# Patient Record
Sex: MALE | Race: WHITE | Employment: FULL TIME | URBAN - METROPOLITAN AREA
[De-identification: names, ages, dates, MRNs, and addresses within clinical notes are randomized per-mention and may not be internally consistent; named-entity substitution may affect disease eponyms.]

---

## 2017-04-15 ENCOUNTER — PATIENT MESSAGE (OUTPATIENT)
Dept: FAMILY MEDICINE CLINIC | Age: 57
End: 2017-04-15

## 2017-04-17 NOTE — TELEPHONE ENCOUNTER
From: Alverto Marcum  To: Juan Underwood MD  Sent: 4/15/2017 11:11 AM EDT  Subject: Update Medical Information    Dr Ruth Kee,  Second email to update my medical information and provide you an update of my current back issue. [id number]: Conducted a EMG test via Neurology department. [id number]: Had another MRI for my lower back. 42XWN05: Spoke with Dr. Shahid Walls and are left with the option of surgery for Stenosis S1, disc issues with L4,L5 & S1. Due to the procedure to relieve the nerve; he will also need to fuse L5 & S1.  1Mar17: Prescribed jennyfer for nerve pain. Jennyfer did work, but due to the weight gain side effect I have elected to no longer take the medication.    31XNP12: Pre-Op appointment  15WIE22: Back surgery  If you have any questions please feel free to contact me.    Steph John

## 2017-04-17 NOTE — TELEPHONE ENCOUNTER
From: Steph Underwood  To: Meng Lind MD  Sent: 4/15/2017 11:02 AM EDT  Subject: Update Medical Information    Dr Merrill Dee,  The following information is to update my medical information and provide you update of my current back issue. 7Apr16: MRI study of the lumbar spine requested by Dr. Linnette Matta: Three (3) MR Lumbar Spine injections over 2 month period - Provided temporary relief. Jun-Oct16: Continued with personal exercises based on previous PT sessions   [id number]: Met with Dr. Jen Segura from the [facility] for ongoing back issues. Reviewed MRI from Apr2016 and diagnosed Lumbar Stenosis / L5 Radiculopathy Transforaminal Right L5/S1 jez Diagnostic / Therapeutic. [id number]: Received (6) Right Transoraminal Epidural Steroid Injection at L5/S1 - Provided temporary relief.     More information to follow on the next email.  Nona Nunez

## 2017-07-13 ENCOUNTER — HOSPITAL ENCOUNTER (OUTPATIENT)
Dept: PHYSICAL THERAPY | Age: 57
Discharge: HOME | End: 2017-07-13
Payer: OTHER GOVERNMENT

## 2017-07-13 PROCEDURE — 97161 PT EVAL LOW COMPLEX 20 MIN: CPT

## 2017-07-13 PROCEDURE — 97110 THERAPEUTIC EXERCISES: CPT

## 2017-07-13 NOTE — PROGRESS NOTES
SUBJECTIVE  Pain Level (0-10 scale): 1  []constant []intermittent []improving []worsening []no change since onset    Any medication changes, allergies to medications, adverse drug reactions, diagnosis change, or new procedure performed?: [x] No    [] Yes (see summary sheet for update)  Subjective functional status/changes:     PLOF: walking, cleaning house, walking the dog, light yard work  Limitations to PLOF: still has to move around at work (sitting was better before than now), better walking than before surgery, [facility] 11 [facility] [place]  Mechanism of Injury: Lumbar fusion L5-S1 with laminectomy on R side and disc repair on 05/30/2017, after  Chronic lower back pain, stenosis, and worsened pain in his hips with stenosis and pressure on nerve  Current symptoms/Complaints: Back related - mobility/ROM, numbness in R LE especially with prolonged sitting; reports HS and now calf pain  Previous Treatment/Compliance: PT for low back pain and injections, no lasting pain relief  PMHx/Surgical Hx: Weight loss (> 10 lbs), lumbar fusion, L knee ACL  Work Hx: Works full time with sitting/computer  Living Situation: Lives with wife, two story home with no difficulty with stairs  Pt Goals: \"Strength, more motion and learn what I can do to improve my condition. \"  Barriers: []pain []financial []time []transportation []other  Motivation: Good  Substance use: []Alcohol []Tobacco []other:   FABQ Score: [x]low []elevate  Cognition: A & O x 3    Other:    OBJECTIVE/EXAMINATION    Symptoms:  Aggravated by:   [x] Bending [x] Sitting [] Standing [] Walking   [] Moving [] Cough [] Sneeze [] Valsalva   [x] AM  [] PM  Lying:  [] sup   [] pro   [x] sidelying   [] Other:     Eased by:    [] Bending [] Sitting [x] Standing [x] Walking   [] Moving [] AM  [] PM  Lying: [] sup  [] pro  [] sidelying   [] Other:     General Health:  Red Flags Indicated?  [] Yes    [] No  [x] Yes [] No Recent weight change (If yes, due to dieting?  [] Yes  [x] No)   [] Yes [x] No Weakness in legs during walking  [] Yes [x] No Unremitting pain at night  [] Yes [x] No Abdominal pain or problems  [] Yes [x] No Rectal bleeding  [] Yes [x] No Feet more cold or painful in cold weather  [] Yes [x] No Menstrual irregularities  [] Yes [x] No Blood or pain with urination  [] Yes [x] No Dysfunction of bowel or bladder  [] Yes [x] No Recent illness within past 3 weeks (i.e, cold, flu)  [] Yes [x] No Numbness/tingling in buttock/genitalia region    OBJECTIVE  Posture:  Lateral Shift: [] R    [] L     [] +  [] -  Kyphosis: [] Increased [] Decreased   []  WNL  Lordosis:  [] Increased [] Decreased   [] WNL  Pelvic symmetry: [] WNL    [] Other:    Gait:  [] Normal     [] Abnormal:    Active Movements: [] N/A   [] Too acute   [] Other:  ROM % AROM MMT Comments:pain, area   Forward flexion 40-60 85 5 Just past mid shin   Extension 20-30 0 5    SB right 20-30 85 5    SB left 20-30 85 5    Rotation right 5-10 100 5    Rotation left 5-10 100 5      Repeated Movements   Effects on present pain: produces (NY), abolishes (A), increases (incr), decreases (decr), centralizes (C), peripheral (PH), no effect (NE)   Pre-Test Sx Flexion Repeated Flexion Extension Repeated Extension Repeated SBL Repeated SBR   Sitting          Standing          Lying      N/A N/A   Comments:  Side Glide:  Sustained passive positioning test:    Neuro Screen [] WNL  Myotome/Dermatome  Reflexes: 2+ B LE  Comments:    Dural Mobility:  SLR Sitting: [] R    [] L    [] +    [] -  @ (degrees):           Supine: [] R    [] L    [] +    [] -  @ (degrees):   Slump Test: [] R    [] L    [] +    [] -  @ (degrees):   Prone Knee Bend: [] R    [] L    [] +    [] -     Palpation  [x] Min  [] Mod  [] Severe    Location: scar mobility fair B  [] Min  [] Mod  [] Severe    Location:  [] Min  [] Mod  [] Severe    Location:    Strength   L(0-5) R (0-5) N/T   Hip Flexion (L1,2) 5 5 []   Knee Extension (L3,4) 5 5 []   Ankle Dorsiflexion (L4) 5 5 []   Great Toe Extension (L5) 4+ 4+ []   Ankle Plantarflexion (S1) 5 5 []   Knee Flexion (S1,2) 4 4 []   Hip ABD 5 5 []   Hip ADD 5 5 []   Hip ext  4 4 []   Ankle Eversion 5 5 []     Special Tests  Lumbar:  Lumb.  Compression: [] Pos  [] Neg               Lumbar Distraction:   [] Pos  [] Neg    Quadrant:  [] Pos  [] Neg   [] Flex  [] Ext    Sacroilliac:  Gaenslen's: [] R    [] L    [] +    [] -     Compression: [] +    [x] -     Gapping:  [] +    [x] -     Thigh Thrust: [x] R    [x] L    [] +    [x] -     Leg Length: [] +    [] -   Position:    Crests:    ASIS:    PSIS:    Sacral Sulcus:    Mobility: Standing flex:     Sitting flex:     Supine to sit:     Prone knee bend:         Hip: Josefine Gathers:  [x] R    [x] L    [x] +    [] -     Scour:  [] R    [] L    [] +    [] -     Piriformis: [x] R    [x] L    [x] +    [] -          Deficits: Álvaro's: [x] R    [x] L    [x] +    [] -     Nabil: [] R    [] L    [] +    [] -     Hamstrings 90/90: (+) B    Elys: (+) B       Other tests/comments:

## 2017-07-13 NOTE — PROGRESS NOTES
PT DAILY TREATMENT NOTE 3-16    Patient Name: Pranay Aguiar  Date:2017  : 1960  [x]  Patient  Verified  Payor: [facility] / Plan: [employer] [facility] [address] AND DEPENDENTS / Product Type: [employer] /    In Nusrat Patrick  Total Treatment Time (min): 48  Visit #: 1 of 4    Treatment Area: Low back pain [M54.5]    SUBJECTIVE  Pain Level (0-10 scale): 1  Any medication changes, allergies to medications, adverse drug reactions, diagnosis change, or new procedure performed?: [x] No    [] Yes (see summary sheet for update)  Subjective functional status/changes:   [] No changes reported  Mechanism of Injury: Lumbar fusion L5-S1 with laminectomy on R side and disc repair on 2017, after  Chronic lower back pain, stenosis, and worsened pain in his hips with stenosis and pressure on nerve  Current symptoms/Complaints: Back related - mobility/ROM, numbness in R LE especially with prolonged sitting; reports HS and now calf pain    OBJECTIVE    30 min [x]Eval                  []Re-Eval     23 min Therapeutic Exercise:  [x] See flow sheet :   Rationale: increase ROM and increase strength to improve the patients ability to perform ADLs, Reviewed with pt involved anatomy and pathology, treatment plan, and goals. With   [] TE   [] TA   [] neuro   [] other: Patient Education: [x] Review HEP    [] Progressed/Changed HEP based on:   [] positioning   [] body mechanics   [] transfers   [] heat/ice application    [] other:      Other Objective/Functional Measures: see eval.     Pain Level (0-10 scale) post treatment: 1    ASSESSMENT/Changes in Function:   Pt presents s/p L5-S1 lumbar fusion on 2017, with limitations noted in trunk flex/B trunk SB AROM, fair scar mobility B, B great toe ext MMT 4+/5, B hip ext MMT 4/5, B knee flex MMT 4/5, (+) B GERRY/FAIR/Álvaro/HS 90-90/Ely's flexibility special tests, and FOTO score of 50.   Pt would benefit from skilled PT intervention to address the above listed limitations and allow improved functional task completion. Patient will continue to benefit from skilled PT services to modify and progress therapeutic interventions, address functional mobility deficits, address ROM deficits, address strength deficits, analyze and address soft tissue restrictions, analyze and cue movement patterns, analyze and modify body mechanics/ergonomics, assess and modify postural abnormalities and instruct in home and community integration to attain remaining goals. [x]  See Plan of Care  []  See progress note/recertification  []  See Discharge Summary         Progress towards goals / Updated goals:  Short Term Goals: To be accomplished in two weeks:                         1.  Pt will demonstrate compliance with HEP for self management of symptoms. 2.  Pt will demonstrate B hip flex and ext MMT to 5/5 to improve stability for position changes. Long Term Goals: To be accomplished in four weeks:                         1.  Pt will demonstrate FOTO to 66 or greater to indicate improved functional task completion. 2.  Pt will demonstrate B HS 90-90 and Ely's flexibility restrictions to minimal to improve tolerance for prolonged positions. 3.  Pt will report ability to return to community walking without the brace to indicate improved functional core stability.     PLAN  [x]  Upgrade activities as tolerated     [x]  Continue plan of care  [x]  Update interventions per flow sheet       []  Discharge due to:_  []  Other:_      Pernell Ledesma, PT 7/13/2017  5:18 PM    Future Appointments  Date Time Provider Joey Hinton   7/17/2017 4:30 PM [id number] [facility]   7/25/2017 3:30 PM [facility]   8/1/2017 4:30 PM Pernell Ledesma PT [facility]

## 2017-07-13 NOTE — PROGRESS NOTES
[facility]  [address] [facility] [address]  [place], [address]  [phone] fax    Plan of Care/ Statement of Necessity for Physical Therapy Services    Patient name: Leroy Tse Start of Care: 2017   Referral source: Anmol Rader MD : 1960    Medical Diagnosis: Low back pain [M54.5]   Onset Date:2017    Treatment Diagnosis: s/p L5-S1 lumbar fusion   Prior Hospitalization: see medical history Provider#: [id number]   Medications: Verified on Patient summary List    Comorbidities: Weight loss (> 10 lbs), lumbar fusion, L knee ACL   Prior Level of Function: Unable to walk/perform work tasks/sitting without pain     The Plan of Care and following information is based on the information from the initial evaluation. Assessment/ key information: Pt presents s/p L5-S1 lumbar fusion on 2017, with limitations noted in trunk flex/B trunk SB AROM, fair scar mobility B, B great toe ext MMT 4+/5, B hip ext MMT 4/5, B knee flex MMT 4/5, (+) B GERRY/FAIR/Álvaro/HS 90-90/Ely's flexibility special tests, and FOTO score of 50. Pt would benefit from skilled PT intervention to address the above listed limitations and allow improved functional task completion.     Evaluation Complexity History LOW Complexity : Zero comorbidities / personal factors that will impact the outcome / POC; Examination LOW Complexity : 1-2 Standardized tests and measures addressing body structure, function, activity limitation and / or participation in recreation  ;Presentation LOW Complexity : Stable, uncomplicated  ;Clinical Decision Making MEDIUM Complexity : FOTO score of 26-74  Overall Complexity Rating: LOW   Problem List: pain affecting function, decrease ROM, decrease strength, decrease ADL/ functional abilitiies, decrease activity tolerance and decrease flexibility/ joint mobility   Treatment Plan may include any combination of the following: Therapeutic exercise, Therapeutic activities, Neuromuscular re-education, Physical agent/modality, Gait/balance training, Manual therapy, Patient education, Self Care training and Functional mobility training  Patient / Family readiness to learn indicated by: asking questions, trying to perform skills and interest  Persons(s) to be included in education: patient (P)  Barriers to Learning/Limitations: None  Patient Goal (s): \"Strength, more motion and learn what I can do to improve my condition. \"  Patient Self Reported Health Status: good  Rehabilitation Potential: good    Short Term Goals: To be accomplished in two weeks:   1. Pt will demonstrate compliance with HEP for self management of symptoms. 2.  Pt will demonstrate B hip flex and ext MMT to 5/5 to improve stability for position changes. Long Term Goals: To be accomplished in four weeks:   1. Pt will demonstrate FOTO to 66 or greater to indicate improved functional task completion. 2.  Pt will demonstrate B HS 90-90 and Ely's flexibility restrictions to minimal to improve tolerance for prolonged positions. 3.  Pt will report ability to return to community walking without the brace to indicate improved functional core stability. Frequency / Duration: Patient to be seen 1 times per week for 4 weeks. Patient/ Caregiver education and instruction: Diagnosis, prognosis, self care, activity modification and exercises   [x]  Plan of care has been reviewed with ALFONSO Matta, PT 7/13/2017 5:08 PM    ________________________________________________________________________    I certify that the above Therapy Services are being furnished while the patient is under my care. I agree with the treatment plan and certify that this therapy is necessary.     [de-identified] Signature:____________________  Date:____________Time: _________    Please sign and return to [facility]  [address]  [place], [address]  [phone] fax

## 2017-07-17 ENCOUNTER — HOSPITAL ENCOUNTER (OUTPATIENT)
Dept: PHYSICAL THERAPY | Age: 57
Discharge: HOME | End: 2017-07-17
Payer: OTHER GOVERNMENT

## 2017-07-17 PROCEDURE — 97110 THERAPEUTIC EXERCISES: CPT

## 2017-07-17 PROCEDURE — 97112 NEUROMUSCULAR REEDUCATION: CPT

## 2017-07-17 NOTE — PROGRESS NOTES
PT DAILY TREATMENT NOTE     Patient Name: Belinda Enriquez  Date:2017  : 1960  [x]  Patient  Verified  Payor: [facility] / Plan: [employer] [facility] [address] AND DEPENDENTS / Product Type: Keerthi Blain /    In time:4:30  Out time:5:06  Total Treatment Time (min): 36  Visit #: 2 of 4    Treatment Area: Low back pain [M54.5]    SUBJECTIVE  Pain Level (0-10 scale): 0-1  Any medication changes, allergies to medications, adverse drug reactions, diagnosis change, or new procedure performed?: [x] No    [] Yes (see summary sheet for update)  Subjective functional status/changes:   [] No changes reported  \"was all over the place this weekend, walking without pain its great\"    OBJECTIVE    26 min Therapeutic Exercise:  [] See flow sheet :   Rationale: increase ROM and increase strength to improve the patients ability to perform recreational activities without pain       10 min Neuromuscular Re-education:  []  See flow sheet :   Rationale: improve balance and increase proprioception  to improve the patients ability to maintain core activation and stability with daily tasks            With   [] TE   [] TA   [] neuro   [] other: Patient Education: [x] Review HEP    [] Progressed/Changed HEP based on:   [] positioning   [] body mechanics   [] transfers   [] heat/ice application    [] other:      Other Objective/Functional Measures:      Pain Level (0-10 scale) post treatment: 0-1    ASSESSMENT/Changes in Function: Pt tolerates first f/u session well and reports good HEP tolerance. He reports minimal lumbar pain at this time but not limiting. Continue to progress as tolerated.     Patient will continue to benefit from skilled PT services to modify and progress therapeutic interventions, address functional mobility deficits, address ROM deficits, address strength deficits, analyze and address soft tissue restrictions, analyze and cue movement patterns, analyze and modify body mechanics/ergonomics, assess and modify postural abnormalities and address imbalance/dizziness to attain remaining goals. []  See Plan of Care  []  See progress note/recertification  []  See Discharge Summary         Progress towards goals / Updated goals:  Short Term Goals: To be accomplished in two weeks:                         1.  Pt will demonstrate compliance with HEP for self management of symptoms. Pt reports HEP compliance 7/17/17                         2.  Pt will demonstrate B hip flex and ext MMT to 5/5 to improve stability for position changes. Long Term Goals: To be accomplished in [address]:                         7.  Pt will demonstrate FOTO to 77 or greater to indicate improved functional task completion.                         2.  Pt will demonstrate B HS 90-90 and Ely's flexibility restrictions to minimal to improve tolerance for prolonged positions.                         3.  Pt will report ability to return to community walking without the brace to indicate improved functional core stability.     PLAN  []  Upgrade activities as tolerated     [x]  Continue plan of care  []  Update interventions per flow sheet       []  Discharge due to:_  []  Other:_      Bharati Remy DPT 7/17/2017  4:39 PM    Future Appointments  Date Time Provider Joey Hinton   7/25/2017 3:30 PM Frankie Clay, PT [facility]   7/28/2017 7:30 AM MD [facility]   8/1/2017 4:30 PM Frankie Clay PT [facility]

## 2017-07-25 ENCOUNTER — HOSPITAL ENCOUNTER (OUTPATIENT)
Dept: PHYSICAL THERAPY | Age: 57
Discharge: HOME | End: 2017-07-25
Payer: OTHER GOVERNMENT

## 2017-07-25 PROCEDURE — 97112 NEUROMUSCULAR REEDUCATION: CPT

## 2017-07-25 PROCEDURE — 97110 THERAPEUTIC EXERCISES: CPT

## 2017-07-25 NOTE — PROGRESS NOTES
PT DAILY TREATMENT NOTE - [facility] 3-16    Patient Name: Pablo Woodward  Date:2017  : 1960  [x]  Patient  Verified  Payor:  / Plan: [employer] [facility] [address] AND DEPENDENTS / Product Type:  /    In time:  Out time:1613  Total Treatment Time (min): 43  Total Timed Codes (min): 43  1:1 Treatment Time ([address] only): 36  Visit #: 3 of 4    Treatment Area: Low back pain [M54.5]    SUBJECTIVE  Pain Level (0-10 scale): 3-4  Any medication changes, allergies to medications, adverse drug reactions, diagnosis change, or new procedure performed?: [x] No    [] Yes (see summary sheet for update)  Subjective functional status/changes:   [] No changes reported  Pt reports more pain when resting/sitting, that is eased with standing. He reports the exercises help to ease it, but he does feel like it's worse than before. OBJECTIVE     min []Eval                  []Re-Eval     15 min Therapeutic Exercise:  [x] See flow sheet :   Rationale: increase ROM and increase strength to improve the patients ability to perform ADLs    28 min Neuromuscular Re-education:  [x]  See flow sheet :   Rationale: increase strength, improve coordination and increase proprioception  to improve the patients ability to perform higher level tasks           With   [] TE   [] TA   [] neuro   [] other: Patient Education: [x] Review HEP    [] Progressed/Changed HEP based on:   [] positioning   [] body mechanics   [] transfers   [] heat/ice application    [] other:      Other Objective/Functional Measures: required cues initially with squatting     Pain Level (0-10 scale) post treatment: 3-4    ASSESSMENT/Changes in Function:   Pt reports worry about his lack of flexibility, however understanding how long it will take to heal.  Reviewed exercises to complete at home and we will work on how to progress home exercises in preparation for d/c.      Patient will continue to benefit from skilled PT services to modify and progress therapeutic interventions, address functional mobility deficits, address ROM deficits, address strength deficits, analyze and address soft tissue restrictions, analyze and cue movement patterns, analyze and modify body mechanics/ergonomics, assess and modify postural abnormalities and instruct in home and community integration to attain remaining goals. []  See Plan of Care  []  See progress note/recertification  []  See Discharge Summary         Progress towards goals / Updated goals:  Short Term Goals: To be accomplished in two weeks:                         0.  Pt will demonstrate compliance with HEP for self management of symptoms. Pt reports HEP compliance 7/17/17                         2.  Pt will demonstrate B hip flex and ext MMT to 5/5 to improve stability for position changes. Long Term Goals: To be accomplished in [address]:                         1.  Pt will demonstrate FOTO to 77 or greater to indicate improved functional task completion.                         2.  Pt will demonstrate B HS 90-90 and Ely's flexibility restrictions to minimal to improve tolerance for prolonged positions.                         3.  Pt will report ability to return to community walking without the brace to indicate improved functional core stability.  Met 7/25/2017    PLAN  [x]  Upgrade activities as tolerated     [x]  Continue plan of care  [x]  Update interventions per flow sheet       []  Discharge due to:_  []  Other:_      Christiano Matta PT 7/25/2017  3:45 PM    Future Appointments  Date Time Provider Joey Hinton   7/28/2017 7:30 AM Sapphire Miller MD [facility]   8/1/2017 4:30 PM Christiano Matta PT [id number]

## 2017-07-28 ENCOUNTER — OFFICE VISIT (OUTPATIENT)
Dept: FAMILY MEDICINE CLINIC | Age: 57
End: 2017-07-28

## 2017-07-28 VITALS
BODY MASS INDEX: 32.47 KG/M2 | TEMPERATURE: 97.8 F | SYSTOLIC BLOOD PRESSURE: 100 MMHG | WEIGHT: 226.8 LBS | HEART RATE: 50 BPM | DIASTOLIC BLOOD PRESSURE: 70 MMHG | OXYGEN SATURATION: 94 % | HEIGHT: 70 IN | RESPIRATION RATE: 16 BRPM

## 2017-07-28 DIAGNOSIS — D22.9 NUMEROUS MOLES: ICD-10-CM

## 2017-07-28 DIAGNOSIS — L98.9 SKIN LESION OF RIGHT ARM: Primary | ICD-10-CM

## 2017-07-28 RX ORDER — GABAPENTIN 300 MG/1
300 CAPSULE ORAL 3 TIMES DAILY
COMMUNITY
End: 2018-02-09

## 2017-07-28 NOTE — PROGRESS NOTES
Armando Richardson is a 49 y.o. male  Pt here today for a mole on right arm and some skin tags. 1. Have you been to the ER, urgent care clinic since your last visit? Hospitalized since your last visit? No    2. Have you seen or consulted any other health care providers outside of the [address] [place] [address] since your last visit? Include any pap smears or colon screening.  Yes Where: neurology

## 2017-07-28 NOTE — PROGRESS NOTES
HISTORY OF PRESENT ILLNESS  Violeta Veronica is a 49 y.o. male. HPI  C/o noticed mole on the right arm about 2 months ago, getting bigger and changing color, not itching  ROS    Physical Exam   Skin:   Triangle-shape light brown lesion 7x7mm on the anterior side of right arm, many brown color moles on trunck       ASSESSMENT and PLAN  Diagnoses and all orders for this visit:    1.  Skin lesion of right arm  -     REFERRAL TO DERMATOLOGY    2. Numerous moles  -     REFERRAL TO DERMATOLOGY

## 2017-08-01 ENCOUNTER — HOSPITAL ENCOUNTER (OUTPATIENT)
Dept: PHYSICAL THERAPY | Age: 57
Discharge: HOME | End: 2017-08-01
Payer: OTHER GOVERNMENT

## 2017-08-01 PROCEDURE — 97110 THERAPEUTIC EXERCISES: CPT

## 2017-08-01 PROCEDURE — 97112 NEUROMUSCULAR REEDUCATION: CPT

## 2017-08-01 NOTE — PROGRESS NOTES
[facility]  [address]  [place], [address]  [phone] fax    Physical Therapy Progress Note  Patient name: Joe Sierra Start of Care: 2017   Referral source: Elie Slaughter MD : 1960                          Medical Diagnosis: Low back pain [M54.5] Onset Date:2017                          Treatment Diagnosis: s/p L5-S1 lumbar fusion   Prior Hospitalization: see medical history Provider#: [id number]   Medications: Verified on Patient summary List    Comorbidities: Weight loss (> 10 lbs), lumbar fusion, L knee ACL   Prior Level of Function: Unable to walk/perform work tasks/sitting without pain  Visits from Start of Care: 4    Missed Visits: 0    Established Goals:       Short Term Goals: To be accomplished in two weeks:                         1.  Pt will demonstrate compliance with HEP for self management of symptoms. Pt reports HEP compliance 17                         2.  Pt will demonstrate B hip flex and ext MMT to 5/5 to improve stability for position changes. met 2017  Long Term Goals: To be accomplished in four weeks:                         4.  Pt will demonstrate FOTO to 66 or greater to indicate improved functional task completion. Near met at 72  2017                         2.  Pt will demonstrate B HS 90-90 and Ely's flexibility restrictions to minimal to improve tolerance for prolonged positions. Not met 2017                         3.  Pt will report ability to return to community walking without the brace to indicate improved functional core stability. Met 2017     Key Functional Changes:   HEP - met  FOTO 65  R hip flex MMT 5/5  L hip flex MMT 5/5  R hip ext MMT 5/5  L hip ext MMT 5/5  R HS 90-90 +  L HS 90-90 +  R Ely's +  L Ely's +  Return to community walking- met                          Updated Goals: to be achieved in 3 weeks:  1.   Pt will demonstrate FOTO to 66 or greater to indicate improved functional task completion. 2.  Pt will demonstrate B HS 90-90 and Ely's flexibility restrictions to minimal to improve tolerance for prolonged positions. 3.  Pt will demonstrate ability to return to the gym without increase in symptoms for long term management of health. ASSESSMENT/RECOMMENDATIONS: Pt demonstrates overall excellent progress with PT, with improved FOTO to 65, B hip flex/ext MMT 5/5, and ability to return to community walking. He does still demonstrate difficulty with flexibility in HS and quads. Pt would benefit from continued PT intervention to address the remaining limitations and return to gym activities. [x]Continue therapy per initial plan/protocol at a frequency of  1 x per week for 3 weeks  []Continue therapy with the following recommended changes:_____________________      _____________________________________________________________________  []Discontinue therapy progressing towards or have reached established goals  []Discontinue therapy due to lack of appreciable progress towards goals  []Discontinue therapy due to lack of attendance or compliance  []Await Physician's recommendations/decisions regarding therapy  []Other:________________________________________________________________    Thank you for this referral.    Aroldo Olmos, PT 8/1/2017 4:44 PM  NOTE TO PHYSICIAN:  PLEASE COMPLETE THE ORDERS BELOW AND   FAX TO [facility]: [phone]  If you are unable to process this request in 24 hours please contact our office: [phone]    ? I have read the above report and request that my patient continue as recommended. ? I have read the above report and request that my patient continue therapy with the following changes/special instructions:__________________________________________________________  ? I have read the above report and request that my patient be discharged from therapy.     Physicians signature: ______________________________Date: ______Time:______

## 2017-08-01 NOTE — PROGRESS NOTES
PT DAILY TREATMENT NOTE -  3-16    Patient Name: Estelle More  Date:2017  : 1960  [x]  Patient  Verified  Payor:  / Plan: [facility] DEPENDENTS / Product Type: éCsar Cannon /    In time:1631  Out time:1710  Total Treatment Time (min): 39  Total Timed Codes (min): 39  1:1 Treatment Time (): 31  Visit #: 4 of 4    Treatment Area: Low back pain [M54.5]    SUBJECTIVE  Pain Level (0-10 scale): 2  Any medication changes, allergies to medications, adverse drug reactions, diagnosis change, or new procedure performed?: [x] No    [] Yes (see summary sheet for update)  Subjective functional status/changes:   [] No changes reported  Pt reports he mowed a part of his yard, and felt more pain at the end. OBJECTIVE     min []Eval                  []Re-Eval     10 min Therapeutic Exercise:  [x] See flow sheet :   Rationale: increase ROM and increase strength to improve the patients ability to perform ADLs    29 min Neuromuscular Re-education:  [x]  See flow sheet :   Rationale: increase strength, improve coordination and increase proprioception  to improve the patients ability to improve stability           With   [] TE   [] TA   [] neuro   [] other: Patient Education: [x] Review HEP    [] Progressed/Changed HEP based on:   [] positioning   [] body mechanics   [] transfers   [] heat/ice application    [] other:      Other Objective/Functional Measures:   HEP - met  FOTO 65  R hip flex MMT 5/5  L hip flex MMT 5/5  R hip ext MMT 5/5  L hip ext MMT 5/5  R HS 90-90 +  L HS 90-90 +  R Ely's +  L Ely's +  Return to community walking- met     Pain Level (0-10 scale) post treatment: 3    ASSESSMENT/Changes in Function:   Pt demonstrates overall excellent progress with PT, with improved FOTO to 65, B hip flex/ext MMT 5/5, and ability to return to community walking. He does still demonstrate difficulty with flexibility in HS and quads.   Pt would benefit from continued PT intervention to address the remaining limitations and return to gym activities. Patient will continue to benefit from skilled PT services to modify and progress therapeutic interventions, address functional mobility deficits, address ROM deficits, address strength deficits, analyze and address soft tissue restrictions, analyze and cue movement patterns, analyze and modify body mechanics/ergonomics, assess and modify postural abnormalities and instruct in home and community integration to attain remaining goals. []  See Plan of Care  [x]  See progress note/recertification  []  See Discharge Summary         Progress towards goals / Updated goals:  Short Term Goals: To be accomplished in two weeks:                         7.  Pt will demonstrate compliance with HEP for self management of symptoms. Pt reports HEP compliance 7/17/17                         2.  Pt will demonstrate B hip flex and ext MMT to 5/5 to improve stability for position changes. met 8/1/2017  Long Term Goals: To be accomplished in four weeks:                         3.  Pt will demonstrate FOTO to 66 or greater to indicate improved functional task completion. Near met at 72  8/1/2017                         2.  Pt will demonstrate B HS 90-90 and Ely's flexibility restrictions to minimal to improve tolerance for prolonged positions. Not met 8/1/2017                         3.  Pt will report ability to return to community walking without the brace to indicate improved functional core stability. Met 7/25/2017    PLAN  [x]  Upgrade activities as tolerated     [x]  Continue plan of care  [x]  Update interventions per flow sheet       []  Discharge due to:_  []  Other:_      Lyubov Dumont, PT 8/1/2017  4:38 PM    No future appointments.

## 2017-08-08 ENCOUNTER — HOSPITAL ENCOUNTER (OUTPATIENT)
Dept: PHYSICAL THERAPY | Age: 57
Discharge: HOME | End: 2017-08-08
Payer: OTHER GOVERNMENT

## 2017-08-08 PROCEDURE — 97112 NEUROMUSCULAR REEDUCATION: CPT

## 2017-08-08 PROCEDURE — 97110 THERAPEUTIC EXERCISES: CPT

## 2017-08-08 NOTE — PROGRESS NOTES
PT DAILY TREATMENT NOTE     Patient Name: Parker Brown  HQWJ:[id number]  : 1960  [x]  Patient  Verified  Payor:  / Plan: [facility]  ACTIVE DUTY AND DEPENDENTS / Product Type:  /    In time:3:30  Out time:5:13  Total Treatment Time (min): 43  1:1 Treatment Time: 45  Visit #: 1 of 3    Treatment Area: Low back pain [M54.5]    SUBJECTIVE  Pain Level (0-10 scale): 4  Any medication changes, allergies to medications, adverse drug reactions, diagnosis change, or new procedure performed?: [x] No    [] Yes (see summary sheet for update)  Subjective functional status/changes:   [] No changes reported  \"I think I've taken a step back\" Pt feels his hip and back pain is returning to a noticeable level    OBJECTIVE    12 min Therapeutic Exercise:  [] See flow sheet :   Rationale: increase ROM and increase strength to improve the patients ability to perform daily tasks and recreational activity with increased ease     23 min Neuromuscular Re-education:  []  See flow sheet :   Rationale: improve coordination, improve balance and increase proprioception  to improve the patients ability to maintain core activation and stability with daily tasks    8 min Manual Therapy:  DTM R piriformis, manual piriformis stretching   Rationale: decrease pain and increase tissue extensibility to improve mobility and activity tolerance        With   [] TE   [] TA   [] neuro   [] other: Patient Education: [x] Review HEP    [] Progressed/Changed HEP based on:   [] positioning   [] body mechanics   [] transfers   [] heat/ice application    [] other:      Other Objective/Functional Measures:      Pain Level (0-10 scale) post treatment: 3    ASSESSMENT/Changes in Function: Pt tolerates all therex well today. He does report concern that he may be regressing due to recent onset of R superior glute pain. Some piriformis sensitivity noted today but not limiting functional mobility in clinic.  Continue to progress core stability    Patient will continue to benefit from skilled PT services to modify and progress therapeutic interventions, address functional mobility deficits, address ROM deficits, address strength deficits, analyze and address soft tissue restrictions, analyze and cue movement patterns and analyze and modify body mechanics/ergonomics to attain remaining goals. []  See Plan of Care  []  See progress note/recertification  []  See Discharge Summary         Progress towards goals / Updated goals:  Updated Goals: to be achieved in 3 weeks:  1.  Pt will demonstrate FOTO to 66 or greater to indicate improved functional task completion. 2.  Pt will demonstrate B HS 90-90 and Ely's flexibility restrictions to minimal to improve tolerance for prolonged positions. 3.  Pt will demonstrate ability to return to the gym without increase in symptoms for long term management of health.     PLAN  []  Upgrade activities as tolerated     [x]  Continue plan of care  []  Update interventions per flow sheet       []  Discharge due to:_  []  Other:_      Mai Randall DPT 8/8/2017  4:32 PM    Future Appointments  Date Time Provider Joey Hinton   8/15/2017 4:00 PM Delicia Hickey PT [facility]   8/22/2017 4:30 PM [facility]   8/29/2017 4:30 PM Mai Randall [facility]

## 2017-08-14 ENCOUNTER — TELEPHONE (OUTPATIENT)
Dept: FAMILY MEDICINE CLINIC | Age: 57
End: 2017-08-14

## 2017-08-14 NOTE — TELEPHONE ENCOUNTER
Pt called stating he went to Dr. Gibran Carrillo office in [place] and at the end of the visit they told him they had not received his referral. Can you please refax referral juan and Shawn duncan

## 2017-08-15 ENCOUNTER — HOSPITAL ENCOUNTER (OUTPATIENT)
Dept: PHYSICAL THERAPY | Age: 57
Discharge: HOME | End: 2017-08-15
Payer: OTHER GOVERNMENT

## 2017-08-15 PROCEDURE — 97112 NEUROMUSCULAR REEDUCATION: CPT

## 2017-08-15 PROCEDURE — 97110 THERAPEUTIC EXERCISES: CPT

## 2017-08-15 NOTE — PROGRESS NOTES
PT DAILY TREATMENT NOTE 3-16    Patient Name: Martin Villegas  Date:8/15/2017  : 1960  [x]  Patient  Verified  Payor:  / Plan: [facility] DEPENDENTS / Product Type:  /    In time:1600  Out time:1648  Total Treatment Time (min): 48  (1:1  = 32 minutes)   Visit #: 2 of 3    Treatment Area: Low back pain [M54.5]    SUBJECTIVE  Pain Level (0-10 scale): 2  Any medication changes, allergies to medications, adverse drug reactions, diagnosis change, or new procedure performed?: [x] No    [] Yes (see summary sheet for update)  Subjective functional status/changes:   [] No changes reported  Pt asked about going back to the gym, and what he will be allowed to do. OBJECTIVE     min []Eval                  []Re-Eval     12 min Therapeutic Exercise:  [x] See flow sheet :   Rationale: increase ROM and increase strength to improve the patients ability to perform ADLs    36 min Neuromuscular Re-education:  [x]  See flow sheet :   Rationale: increase strength, improve coordination, improve balance and increase proprioception  to improve the patients ability to improve stability            With   [] TE   [] TA   [] neuro   [] other: Patient Education: [x] Review HEP    [] Progressed/Changed HEP based on:   [] positioning   [] body mechanics   [] transfers   [] heat/ice application    [] other:      Other Objective/Functional Measures: reviewed return to the gym activities     Pain Level (0-10 scale) post treatment: 1    ASSESSMENT/Changes in Function:   Pt asked about returning to the gym, and we reviewed which activities would be okay or not okay (avoid impact/jumping, stop if painful, and no arching his lower back). Pt did well with progressed exercises today, and will progress with core engagement exercises as well.     Patient will continue to benefit from skilled PT services to modify and progress therapeutic interventions, address functional mobility deficits, address ROM deficits, address strength deficits, analyze and address soft tissue restrictions, analyze and cue movement patterns, analyze and modify body mechanics/ergonomics, assess and modify postural abnormalities, address imbalance/dizziness and instruct in home and community integration to attain remaining goals. []  See Plan of Care  []  See progress note/recertification  []  See Discharge Summary         Progress towards goals / Updated goals:  Updated Goals: to be achieved in 3 weeks:  1.  Pt will demonstrate FOTO to 66 or greater to indicate improved functional task completion. 2.  Pt will demonstrate B HS 90-90 and Ely's flexibility restrictions to minimal to improve tolerance for prolonged positions. 3.  Pt will demonstrate ability to return to the gym without increase in symptoms for long term management of health.   Reviewed return to the gym 8/15/2017      PLAN  [x]  Upgrade activities as tolerated     [x]  Continue plan of care  [x]  Update interventions per flow sheet       []  Discharge due to:_  []  Other:_      Kevin Gamez, PT 8/15/2017  3:55 PM    Future Appointments  Date Time Provider Joey Hinton   8/15/2017 4:00 PM Kevin Gamez, PT [facility]   8/22/2017 4:30 PM [facility]   8/29/2017 4:30 PM Alex Lopez [facility]

## 2017-08-22 ENCOUNTER — HOSPITAL ENCOUNTER (OUTPATIENT)
Dept: PHYSICAL THERAPY | Age: 57
Discharge: HOME | End: 2017-08-22
Payer: OTHER GOVERNMENT

## 2017-08-22 PROCEDURE — 97112 NEUROMUSCULAR REEDUCATION: CPT

## 2017-08-22 PROCEDURE — 97110 THERAPEUTIC EXERCISES: CPT

## 2017-08-22 NOTE — PROGRESS NOTES
PT DAILY TREATMENT NOTE 3-16    Patient Name: Riddhi Rick  Date:2017  : 1960  [x]  Patient  Verified  Payor:  / Plan: [facility] DEPENDENTS / Product Type:  /    In time:1630  Out time:1707  Total Treatment Time (min): 37  (1:1  = 34 minutes)  Visit #: 3 of 3    Treatment Area: Low back pain [M54.5]    SUBJECTIVE  Pain Level (0-10 scale): 2  Any medication changes, allergies to medications, adverse drug reactions, diagnosis change, or new procedure performed?: [x] No    [] Yes (see summary sheet for update)  Subjective functional status/changes:   [] No changes reported  Pt reports being \"peachy. \"  He reports feeling \"horrible\" though with the \"typical rollarcoaster, where before I was steadily going up, but now. Eura Isabel Eura Isabel \"  He reports on  he walked with his bravo on the golf [place] and he had a hard time sleeping that night. Yesterday he was able to do a 2.5 mile walk yesterday, but has a lot of aching today. He also reports almost going to the ED yesterday due to R sided heart pain, but it eased up when he was ready to go. He also reports gaining 5 lbs. He was advised to inform his surgeon on 17 when he sees him, and to go to the ED sooner if any doubts persist or symptoms worsen.     OBJECTIVE     min []Eval                  []Re-Eval     23 min Therapeutic Exercise:  [x] See flow sheet :   Rationale: increase ROM and increase strength to improve the patients ability to perform ADLs    14 min Neuromuscular Re-education:  [x]  See flow sheet :   Rationale: increase strength, improve coordination and increase proprioception  to improve the patients ability to improve stability            With   [] TE   [] TA   [] neuro   [] other: Patient Education: [x] Review HEP    [] Progressed/Changed HEP based on:   [] positioning   [] body mechanics   [] transfers   [] heat/ice application    [] other:      Other Objective/Functional Measures:   FOTO 62  HS 90-90 (-) R, (-) L  Ely's (-)  R, (+) L     Pain Level (0-10 scale) post treatment: 1    ASSESSMENT/Changes in Function:   Pt demonstrates excellent progress with PT. He is able to self manage symptoms, though does report fluctuations of symptoms with increased activity. He reports he know that it may take up to a year to feel back to normal, and he plans to continue with PT exercises and work back into gym activities. []  See Plan of Care  []  See progress note/recertification  [x]  See Discharge Summary         Progress towards goals / Updated goals:  Updated Goals: to be achieved in 3 weeks:  1.  Pt will demonstrate FOTO to 66 or greater to indicate improved functional task completion. Not met, 62/100 8/22/2017  2.  Pt will demonstrate B HS 90-90 and Ely's flexibility restrictions to minimal to improve tolerance for prolonged positions. Near met for all but L Ely's 8/22/2017  3.  Pt will demonstrate ability to return to the gym without increase in symptoms for long term management of health.   Reviewed return to the gym 8/15/2017    PLAN  []  Upgrade activities as tolerated     []  Continue plan of care  []  Update interventions per flow sheet       [x]  Discharge due to completion of program  []  Other:_      Avery Pastor, PT 8/22/2017  4:30 PM    Future Appointments  Date Time Provider Joey Hinton   8/29/2017 4:30 PM [id number] [facility]

## 2017-08-22 NOTE — PROGRESS NOTES
[facility]  [address] [facility] [address]  [place], [address]  [phone] fax    Physical Therapy Discharge Summary  Patient name: Parker Brown Start of Care: 2017   Referral source: Staci Alcocer MD : 1960                          Medical Diagnosis: Low back pain [M54.5] Onset Date:2017                          Treatment Diagnosis: s/p L5-S1 lumbar fusion   Prior Hospitalization: see medical history Provider#: [id number]   Medications: Verified on Patient summary List    Comorbidities: Weight loss (> 10 lbs), lumbar fusion, L knee ACL   Prior Level of Function: Unable to walk/perform work tasks/sitting without pain  Visits from Start of Care: 7    Missed Visits: 0  Reporting Period : 2017 to 2017    Summary of Care:  Updated Goals: to be achieved in 3 weeks:  1.  Pt will demonstrate FOTO to 66 or greater to indicate improved functional task completion. Not met, 62/100 2017  2.  Pt will demonstrate B HS 90-90 and Ely's flexibility restrictions to minimal to improve tolerance for prolonged positions. Near met for all but L Ely's 2017  3.  Pt will demonstrate ability to return to the gym without increase in symptoms for long term management of health.  Reviewed return to the gym 8/15/2017    ASSESSMENT/RECOMMENDATIONS: Pt demonstrates excellent progress with PT. He is able to self manage symptoms, though does report fluctuations of symptoms with increased activity. He reports he know that it may take up to a year to feel back to normal, and he plans to continue with PT exercises and work back into gym activities.   [x]Discontinue therapy: [x]Patient has reached or is progressing toward set goals      []Patient is non-compliant or has abdicated      []Due to lack of appreciable progress towards set goals    Carolee Franco, PT 2017 4:37 PM

## 2017-08-29 ENCOUNTER — APPOINTMENT (OUTPATIENT)
Dept: PHYSICAL THERAPY | Age: 57
End: 2017-08-29
Payer: OTHER GOVERNMENT

## 2018-02-09 ENCOUNTER — OFFICE VISIT (OUTPATIENT)
Dept: FAMILY MEDICINE CLINIC | Age: 58
End: 2018-02-09

## 2018-02-09 VITALS
TEMPERATURE: 97.3 F | HEIGHT: 70 IN | OXYGEN SATURATION: 97 % | SYSTOLIC BLOOD PRESSURE: 124 MMHG | BODY MASS INDEX: 35.22 KG/M2 | WEIGHT: 246 LBS | HEART RATE: 54 BPM | DIASTOLIC BLOOD PRESSURE: 78 MMHG | RESPIRATION RATE: 20 BRPM

## 2018-02-09 DIAGNOSIS — M48.07 SPINAL STENOSIS OF LUMBOSACRAL REGION: ICD-10-CM

## 2018-02-09 DIAGNOSIS — Z13.29 SCREENING FOR ENDOCRINE, METABOLIC, AND IMMUNITY DISORDER: ICD-10-CM

## 2018-02-09 DIAGNOSIS — Z13.228 SCREENING FOR ENDOCRINE, METABOLIC, AND IMMUNITY DISORDER: ICD-10-CM

## 2018-02-09 DIAGNOSIS — Z13.0 SCREENING FOR ENDOCRINE, METABOLIC, AND IMMUNITY DISORDER: ICD-10-CM

## 2018-02-09 DIAGNOSIS — M54.41 CHRONIC RIGHT-SIDED LOW BACK PAIN WITH RIGHT-SIDED SCIATICA: ICD-10-CM

## 2018-02-09 DIAGNOSIS — L30.0 NUMMULAR ECZEMATOUS DERMATITIS: Primary | ICD-10-CM

## 2018-02-09 DIAGNOSIS — Z13.1 SCREENING FOR DIABETES MELLITUS: ICD-10-CM

## 2018-02-09 DIAGNOSIS — Z23 ENCOUNTER FOR IMMUNIZATION: ICD-10-CM

## 2018-02-09 DIAGNOSIS — Z13.89 SCREENING FOR BLOOD OR PROTEIN IN URINE: ICD-10-CM

## 2018-02-09 DIAGNOSIS — Z13.29 SCREENING FOR THYROID DISORDER: ICD-10-CM

## 2018-02-09 DIAGNOSIS — R73.03 PREDIABETES: Chronic | ICD-10-CM

## 2018-02-09 DIAGNOSIS — G89.29 CHRONIC RIGHT-SIDED LOW BACK PAIN WITH RIGHT-SIDED SCIATICA: ICD-10-CM

## 2018-02-09 DIAGNOSIS — M25.551 RIGHT HIP PAIN: ICD-10-CM

## 2018-02-09 DIAGNOSIS — Z13.21 ENCOUNTER FOR VITAMIN DEFICIENCY SCREENING: ICD-10-CM

## 2018-02-09 DIAGNOSIS — Z13.220 SCREENING, LIPID: ICD-10-CM

## 2018-02-09 RX ORDER — TRIAMCINOLONE ACETONIDE 5 MG/G
OINTMENT TOPICAL 3 TIMES DAILY
Qty: 30 G | Refills: 0 | Status: SHIPPED | OUTPATIENT
Start: 2018-02-09 | End: 2018-05-30 | Stop reason: SDUPTHER

## 2018-02-09 RX ORDER — TRIAMCINOLONE ACETONIDE 5 MG/G
OINTMENT TOPICAL 3 TIMES DAILY
Qty: 30 G | Refills: 0 | Status: SHIPPED | OUTPATIENT
Start: 2018-02-09 | End: 2018-02-09 | Stop reason: SDUPTHER

## 2018-02-09 NOTE — PATIENT INSTRUCTIONS
Some information about Shingrix (the new Shingles vaccine) . .. Prior to Shingrix, the only shingles vaccine available has been Zostavax (2006), an attenuated live virus vaccine which was approved for use between the ages of 48 and 61, but only recommended by the CDC for adults age 61 and older. Zostavax studies suggest that it offers 70% protection against shingles for people between 48 and 61, but only 18% when given to people age [de-identified] and older. When all ages are taken into consideration, Zostavax reduces the chance of shingles by 51% and the risk of PHN (post-herpetic neuralgia, or continued pain after the shingles rash has resolved) by 67%. Studies also suggest that Zostavax effectiveness likely begins to wane after only 3-5 years. The new Shingrix vaccine (whose two doses will likely be given at least eight weeks apart), according to clinical trials, offers 97% protection when given to people in their 50s and 60s and ~91% protection when given to those in their 70s and 80s. And it appears to retain its effectiveness without evidence of waning protection after 4 years. It does not contain live virus (making it safe to administer to patients who are in contact with pregnant women and patients with immune suppression and eliminating the chance of getting shingles from the vaccine). It also contains an adjuvant, a substance that boosts the immune systems response. This may be what makes Shingrix both more effective and longer-lasting. Both ACIP (The Advisory Committee on [employer]) and the CDC (the Center for Disease Control) recommend Shingrix as first line therapy in all adults 48 and older, including any adults who previously received Zostavax. Medicare currently will not cover this vaccine and [employer] anticipates that the $280 cost for the two shots is not likely to be covered by insurance right away, though private insurers are expected to  coverage in 2018.   Medicare coverage may change in the future, though all indicators suggest coverage will be limited (similar to the way Zostavax coverage has been limited).

## 2018-02-09 NOTE — MR AVS SNAPSHOT
[address]201 [facility] [id number] [phone] Patient: Uriel Worrell MRN: [id number] Jacklyn Pan Visit Information Date & Time Provider Department Dept. Phone Encounter #  
 2/9/2018  8:30 AM Glory Fontanez, [address] [phone] [id number] Follow-up Instructions Return for (30m) Preventive visit 1-2 wks after labs, please arrive 15m early. Upcoming Health Maintenance Date Due DTaP/Tdap/Td series (1 - Tdap) 8/18/1983 Influenza Age 5 to Adult 8/1/2017 COLONOSCOPY 6/18/2023* *Topic was postponed. The date shown is not the original due date. Allergies as of 2/9/2018  Review Complete On: 2/9/2018 By: Glory Fontanez MD  
  
 Severity Noted Reaction Type Reactions Lyrica [Pregabalin] Low 04/17/2017   Side Effect Other (comments) Weight gain Current Immunizations  Never Reviewed Name Date Influenza Vaccine 11/18/2017 Tdap 2/9/2018 Not reviewed this visit You Were Diagnosed With   
  
 Codes Comments Nummular eczematous dermatitis    -  Primary ICD-10-CM: L30.0 ICD-9-CM: 692.9 Prediabetes     ICD-10-CM: R73.03 
ICD-9-CM: 790.29 Encounter for immunization     ICD-10-CM: J81 ICD-9-CM: V03.89 BMI 35.0-35.9,adult     ICD-10-CM: F93.40 ICD-9-CM: V85.35 Vitals BP Pulse Temp Resp Height(growth percentile) Weight(growth percentile) 124/78 (!) 54 97.3 °F (36.3 °C) (Oral) 20 5' 10\" (1.778 m) 246 lb (111.6 kg) SpO2 BMI Smoking Status 97% 35.3 kg/m2 Never Smoker Vitals History BMI and BSA Data Body Mass Index Body Surface Area  
 35.3 kg/m 2 2.35 m 2 Preferred Pharmacy Pharmacy Name Phone [address] [facility] [place] [facility] [phone] Your Updated Medication List  
  
   
 This list is accurate as of: 2/9/18  9:17 AM.  Always use your most recent med list.  
  
  
  
  
 ALEVE 220 mg tablet Generic drug:  naproxen sodium Take 2,000 mg by mouth two (2) times daily (with meals). cpap machine kit  
by Does Not Apply route. G47.33 - Update CPAP set @ Auto, 10-15cm H20, heated humidifier, tubing, climate line, filters (6), chinstrap, integrated heated humidifier. Authorized for 2nd mask and headgear within one year if needed  
  
 triamcinolone acetonide 0.5 % ointment Commonly known as:  KENALOG Apply  to affected area three (3) times daily. use thin layer to affected area on left forearm Prescriptions Sent to Pharmacy Refills  
 triamcinolone acetonide (KENALOG) 0.5 % ointment 0 Sig: Apply  to affected area three (3) times daily. use thin layer to affected area on left forearm Class: Normal  
 Pharmacy: [address] [facility] 100 [facility], [facility] [phone] Route: Topical  
  
We Performed the Following NC IMMUNIZ ADMIN,1 SINGLE/COMB VAC/TOXOID [id number] CPT(R)] TETANUS, DIPHTHERIA TOXOIDS AND ACELLULAR PERTUSSIS VACCINE (TDAP), IN INDIVIDS. >=7, IM [id number] CPT(R)] Follow-up Instructions Return for (30m) Preventive visit 1-2 wks after labs, please arrive 15m early. Patient Instructions Some information about Shingrix (the new Shingles vaccine) . .. Prior to Shingrix, the only shingles vaccine available has been Zostavax (2006), an attenuated live virus vaccine which was approved for use between the ages of 48 and 61, but only recommended by the CDC for adults age 61 and older. Zostavax studies suggest that it offers 70% protection against shingles for people between 48 and 61, but only 18% when given to people age [de-identified] and older.   When all ages are taken into consideration, Zostavax reduces the chance of shingles by 51% and the risk of PHN (post-herpetic neuralgia, or continued pain after the shingles rash has resolved) by 67%. Studies also suggest that Zostavax effectiveness likely begins to wane after only 3-5 years. The new Shingrix vaccine (whose two doses will likely be given at least eight weeks apart), according to clinical trials, offers 97% protection when given to people in their 50s and 60s and ~91% protection when given to those in their 70s and 80s. And it appears to retain its effectiveness without evidence of waning protection after 4 years. It does not contain live virus (making it safe to administer to patients who are in contact with pregnant women and patients with immune suppression and eliminating the chance of getting shingles from the vaccine). It also contains an adjuvant, a substance that boosts the immune systems response. This may be what makes Shingrix both more effective and longer-lasting. Both ACIP (The Advisory Committee on [employer]) and the CDC (the Center for Disease Control) recommend Shingrix as first line therapy in all adults 48 and older, including any adults who previously received Zostavax. Medicare currently will not cover this vaccine and [employer] anticipates that the $280 cost for the two shots is not likely to be covered by insurance right away, though private insurers are expected to  coverage in 2018. Medicare coverage may change in the future, though all indicators suggest coverage will be limited (similar to the way Zostavax coverage has been limited). Introducing [facility]! Dear Johan De La Cruz: Thank you for requesting a [facility] account. Our records indicate that you already have an active [employer] account. You can access your account anytime at [email or web address] Did you know that you can access your hospital and ER discharge instructions at any time in [facility] [facility]?   You can also review all of your test results from your hospital stay or ER visit. Additional Information If you have questions, please visit the Frequently Asked Questions section of the [employer] website at [email or web address] Remember, [employer] is NOT to be used for urgent needs. For medical emergencies, dial 911. Now available from your iPhone and Android! Please provide this summary of care documentation to your next provider. Your primary care clinician is listed as Chelsea Turner. If you have any questions after today's visit, please call [phone].

## 2018-02-09 NOTE — PROGRESS NOTES
PRE-VISIT PLANNING:     Future Appointments  Date Time Provider Joey Hinton   2018 8:30 AM Amy Izquierdo MD Marimar Reyes (PCP is Amy Izquierdo MD) is scheduled for an office visit with Amy Izquierdo MD on 2018 for skin condition. Encounter opened prior to visit to complete pre-visit planning, update and review pertinent sections in the chart. Last office visit with PCP was 16. Seen by Dr. Jillian Cordova for moles on 17. Rooming Nurse Items:  -- Flu shot  -- TDAP   -- Reminder to schedule preventive visit with fasting labs 1-2 weeks prior    Pending items for provider to review with patient:  Health Maintenance Due   Topic Date Due    DTaP/Tdap/Td series (1 - Tdap) 1983    Influenza Age 5 to Adult  2017          Internal Medicine  Acute Care Visit  [employer] at [address] [place], [place], [address] [place] [address]  Phone [phone]; Fax [phone]    Date of Service:  2018  Patient's Name & :   Terell Garcia - 1960   Patient's PCP:  Amy Izquierdo MD     Assessment/Plan:       Terell Garcia is a 56 y.o. male who was seen today for an acute care visit. The primary encounter diagnosis was Nummular eczematous dermatitis. Diagnoses of Prediabetes, Right hip pain, Chronic right-sided low back pain with right-sided sciatica, Spinal stenosis of lumbosacral region, Encounter for immunization, BMI 35.0-35.9,adult, Screening for endocrine, metabolic, and immunity disorder, Screening for diabetes mellitus, Screening, lipid, Screening for thyroid disorder, Screening for blood or protein in urine, and Encounter for vitamin deficiency screening were also pertinent to this visit.   Rash c/w nummular eczema, treat topically and if not improving in 3-4 weeks call for derm referral  Discussed weight gain  Seeing NSGY for chronic low back pain  Fasting labs and preventive visit next earliest convenience  Review Shingrix info (see AVS)      Body mass index is 35.3 kg/(m^2). Discussed achieving/maintaining healthy weight and BMI. Follow up BMI/weight at next visit. Orders Placed This Encounter    Tetanus, diphtheria toxoids and acellular pertussis (TDAP) vaccine, in individuals >=7 years, IM    CBC WITH AUTOMATED DIFF    METABOLIC PANEL, COMPREHENSIVE    HEMOGLOBIN A1C WITH EAG    LIPID PANEL    URINALYSIS W/ RFLX MICROSCOPIC    TSH AND FREE T4    VITAMIN D, 25 HYDROXY    TX IMMUNIZ ADMIN,1 SINGLE/COMB VAC/TOXOID    DISCONTD: triamcinolone acetonide (KENALOG) 0.5 % ointment    DISCONTD: triamcinolone acetonide (KENALOG) 0.5 % ointment    triamcinolone acetonide (KENALOG) 0.5 % ointment       Patient instructions: Follow up with Jael García MD 1-2 weeks after fasting labs for preventive visit. Call and request an earlier appointment with your PCP if you have any new questions or concerns or if your symptoms fail to improve as expected. The patient states understanding of recommended treatment plan. Jael García MD - Internal Medicine  2/9/2018, 8:36 PM    Subjective/Discussion        Chief Complaint   Patient presents with    Rash     left arm       Lacey Shafer is a very pleasant 56 y.o. male complaining of slightly itchy round scaly and red rash on left arm for a few weeks. Since his last OV with me he had back surgery at [facility] with Dr. Zach Jett on 5/30/17 \"unroofing, screws placed and then a metal cage placed\" which resolved his right hip pain. Still has some numbness/tingling in right leg and still has chronic low back pain with right sided sciatica intermittently. He was put on a neuropathic pain treatment (probably Lyrica, though he wasn't sure of name) that caused significant weight gain. He is no longer taking it, but the weight is slow to come off in spite of exercise 3 days a week. Diet could use work.       Review of Systems (positives in bold) General ROS:  chills, fatigue, fever, hot flashes, malaise, night sweats, sleep disturbance, weight gain, weight loss  Ophthalmic ROS: blurry vision, decreased vision, double vision, dry eyes, excessive tearing, eye pain, itchy eyes, loss of vision, photophobia, scotomata, uses contacts or glasses  Dermatological ROS: acne, dry skin, eczema, hair changes, lumps, mole changes, nail changes, pruritus, rash, skin lesion changes  Musculoskeletal ROS: gait disturbance, joint pain, joint stiffness, joint swelling, muscle pain, muscular weakness, joint buckling/instability, joint triggering, back pain  Hematological and Lymphatic ROS: bleeding problems, blood clots, bruising, fatigue, jaundice, night sweats, swollen lymph nodes, unexplained weight loss  Allergy and Immunology ROS: hives, itchy/watery eyes, nasal congestion, postnasal drip or seasonal allergies  Psychological ROS: anxiety, behavioral disorder, concentration difficulties, decreased libido, depression, disorientation, hallucinations, irritability, mood swings, obsessive thoughts, sleep disturbances, suicidal ideation or homicidal ideation        Exam:   VS:    Visit Vitals    /78    Pulse (!) 54    Temp 97.3 °F (36.3 °C) (Oral)    Resp 20    Ht 5' 10\" (1.778 m)    Wt 246 lb (111.6 kg)    SpO2 97%    BMI 35.3 kg/m2       General:   Well-appearing male seated comfortably,         Well-nourished, well-groomed, conversant, alert, in no acute distress.      Psych:  Affect pleasant, appropriate,  interactive, facies and mood are congruent,     behavior and conversation are appropriate, thought process linear and logical     Memory appears to be normal throughout interview  Skin:    Single well demarcated round quarter sized plaque of coarse silvery white scale overlying erythema on mid dorsal left forearm; no central clearing, no drainage or fluctuance,  Lymph:   No cervical, pre- or post-auricular, submandibular, occipital, axillary or     inguinal lymphadenopathy. Encounter Diagnoses:     Encounter Diagnoses     ICD-10-CM ICD-9-CM   1. Nummular eczematous dermatitis L30.0 692.9   2. Prediabetes R73.03 790.29   3. Right hip pain M25.551 719.45   4. Chronic right-sided low back pain with right-sided sciatica M54.41 724.2    G89.29 724.3     338.29   5. Spinal stenosis of lumbosacral region M48.07 724.02   6. Encounter for immunization Z23 V03.89   7. BMI 35.0-35.9,adult Z68.35 V85.35   8. Screening for endocrine, metabolic, and immunity disorder Z13.29 V77.99    Z13.228     Z13.0    9. Screening for diabetes mellitus Z13.1 V77.1   10. Screening, lipid Z13.220 V77.91   11. Screening for thyroid disorder Z13.29 V77.0   12. Screening for blood or protein in urine Z13.89 V82.9   13. Encounter for vitamin deficiency screening Z13.21 V77.99       Additional History     Past Medical History:   Diagnosis Date    Bulging lumbar disc June 2014    L5/S1 ([facility] MRI June 2014); Small non-impinging left posterior disc protrusion L5/S1,    GERD (gastroesophageal reflux disease)     Uses PRN OTC Prilosec, doing well with low acid foods    Low back pain 5/29/2014    Prediabetes     HbA1C 5.9% 12/15    Right-sided low back pain with right-sided sciatica 07/01/2015    Anticipating surgery 5/30/17 stenosis S1, DDD  L4-S1 with fusion of L5 & S1    Sleep apnea     Has CPAP    Spinal stenosis June 2014    Lumbar spine MRI with mild degenerative spondylosis without high-grade stenosis.      Past Surgical History:   Procedure Laterality Date    HX ACL RECONSTRUCTION  2004    HX LUMBAR LAMINECTOMY  05/30/2017    [facility] - Dr. Emeterio Valencia      Social History   Substance Use Topics    Smoking status: Never Smoker    Smokeless tobacco: Never Used      Comment: \"light smoking at 20 to look cool at the bars to  women\"    Alcohol use Yes      Comment: 1/mo     Current Outpatient Prescriptions   Medication Sig    triamcinolone acetonide (KENALOG) 0.5 % ointment Apply  to affected area three (3) times daily. use thin layer to affected area on left forearm    naproxen sodium (ALEVE) 220 mg tablet Take 2,000 mg by mouth two (2) times daily (with meals).  cpap machine kit by Does Not Apply route. G47.33 - Update CPAP set @ Auto, 10-15cm H20, heated humidifier, tubing, climate line, filters (6), chinstrap, integrated heated humidifier. Authorized for 2nd mask and headgear within one year if needed     No current facility-administered medications for this visit. Allergies   Allergen Reactions    Lyrica [Pregabalin] Other (comments)     Weight gain            This document may have been created with the aid of dictation software. Text may contain errors, particularly phonetic errors.

## 2018-02-09 NOTE — PROGRESS NOTES
Yves Saucedo is a 56 y.o. male (: 1960) presenting to address:    Chief Complaint   Patient presents with    Rash     left arm       Vitals:    18 0829   BP: 124/78   Pulse: (!) 54   Resp: 20   Temp: 97.3 °F (36.3 °C)   TempSrc: Oral   SpO2: 97%   Weight: 246 lb (111.6 kg)   Height: 5' 10\" (1.778 m)   PainSc:   0 - No pain       Hearing/Vision:   No exam data present    Learning Assessment:     Learning Assessment 2014   PRIMARY LEARNER Patient   HIGHEST LEVEL OF EDUCATION - PRIMARY LEARNER  -   BARRIERS PRIMARY LEARNER -   PRIMARY LANGUAGE ENGLISH   LEARNER PREFERENCE PRIMARY READING   ANSWERED BY patient   RELATIONSHIP SELF     Depression Screening:     PHQ over the last two weeks 2018   Little interest or pleasure in doing things Not at all   Feeling down, depressed or hopeless Not at all   Total Score PHQ 2 0     Fall Risk Assessment:   No flowsheet data found. Abuse Screening:     Abuse Screening Questionnaire 2018   Do you ever feel afraid of your partner? N   Are you in a relationship with someone who physically or mentally threatens you? N   Is it safe for you to go home? Y     Coordination of Care Questionaire:   1. Have you been to the ER, urgent care clinic since your last visit? Hospitalized since your last visit? NO    2. Have you seen or consulted any other health care providers outside of the [address] [place] [address] since your last visit? Include any pap smears or colon screening. YES neurology    Advanced Directive:   1. Do you have an Advanced Directive? YES    2. Would you like information on Advanced Directives?  NO

## 2018-02-09 NOTE — PROGRESS NOTES
TDAP Immunization/s administered 2/9/2018 by Sergei Casanova LPN with consent. Patient tolerated procedure well. No reactions noted.

## 2018-02-14 ENCOUNTER — HOSPITAL ENCOUNTER (OUTPATIENT)
Dept: LAB | Age: 58
Discharge: HOME | End: 2018-02-14
Payer: OTHER GOVERNMENT

## 2018-02-14 DIAGNOSIS — Z13.29 SCREENING FOR ENDOCRINE, METABOLIC, AND IMMUNITY DISORDER: ICD-10-CM

## 2018-02-14 DIAGNOSIS — Z13.0 SCREENING FOR ENDOCRINE, METABOLIC, AND IMMUNITY DISORDER: ICD-10-CM

## 2018-02-14 DIAGNOSIS — Z13.1 SCREENING FOR DIABETES MELLITUS: ICD-10-CM

## 2018-02-14 DIAGNOSIS — Z13.29 SCREENING FOR THYROID DISORDER: ICD-10-CM

## 2018-02-14 DIAGNOSIS — R73.03 PREDIABETES: Chronic | ICD-10-CM

## 2018-02-14 DIAGNOSIS — Z13.21 ENCOUNTER FOR VITAMIN DEFICIENCY SCREENING: ICD-10-CM

## 2018-02-14 DIAGNOSIS — Z13.228 SCREENING FOR ENDOCRINE, METABOLIC, AND IMMUNITY DISORDER: ICD-10-CM

## 2018-02-14 DIAGNOSIS — Z13.89 SCREENING FOR BLOOD OR PROTEIN IN URINE: ICD-10-CM

## 2018-02-14 DIAGNOSIS — Z13.220 SCREENING, LIPID: ICD-10-CM

## 2018-02-14 LAB
25(OH)D3 SERPL-MCNC: 15.1 NG/ML (ref 30–100)
ALBUMIN SERPL-MCNC: 4.1 G/DL (ref 3.4–5)
ALBUMIN/GLOB SERPL: 1.4 {RATIO} (ref 0.8–1.7)
ALP SERPL-CCNC: 82 U/L (ref 45–117)
ALT SERPL-CCNC: 37 U/L (ref 16–61)
ANION GAP SERPL CALC-SCNC: 8 MMOL/L (ref 3–18)
APPEARANCE UR: CLEAR
AST SERPL-CCNC: 27 U/L (ref 15–37)
BASOPHILS # BLD: 0 K/UL (ref 0–0.06)
BASOPHILS NFR BLD: 1 % (ref 0–2)
BILIRUB SERPL-MCNC: 0.5 MG/DL (ref 0.2–1)
BILIRUB UR QL: NEGATIVE
BUN SERPL-MCNC: 14 MG/DL (ref 7–18)
BUN/CREAT SERPL: 13 (ref 12–20)
CALCIUM SERPL-MCNC: 8.8 MG/DL (ref 8.5–10.1)
CHLORIDE SERPL-SCNC: 105 MMOL/L (ref 100–108)
CHOLEST SERPL-MCNC: 116 MG/DL
CO2 SERPL-SCNC: 29 MMOL/L (ref 21–32)
COLOR UR: YELLOW
CREAT SERPL-MCNC: 1.04 MG/DL (ref 0.6–1.3)
DIFFERENTIAL METHOD BLD: ABNORMAL
EOSINOPHIL # BLD: 0.2 K/UL (ref 0–0.4)
EOSINOPHIL NFR BLD: 3 % (ref 0–5)
ERYTHROCYTE [DISTWIDTH] IN BLOOD BY AUTOMATED COUNT: 12.5 % (ref 11.6–14.5)
EST. AVERAGE GLUCOSE BLD GHB EST-MCNC: 126 MG/DL
GLOBULIN SER CALC-MCNC: 3 G/DL (ref 2–4)
GLUCOSE SERPL-MCNC: 102 MG/DL (ref 74–99)
GLUCOSE UR STRIP.AUTO-MCNC: NEGATIVE MG/DL
HBA1C MFR BLD: 6 % (ref 4.2–5.6)
HCT VFR BLD AUTO: 49.7 % (ref 36–48)
HDLC SERPL-MCNC: 28 MG/DL (ref 40–60)
HDLC SERPL: 4.1 {RATIO} (ref 0–5)
HGB BLD-MCNC: 16.3 G/DL (ref 13–16)
HGB UR QL STRIP: NEGATIVE
KETONES UR QL STRIP.AUTO: NEGATIVE MG/DL
LDLC SERPL CALC-MCNC: 64.4 MG/DL (ref 0–100)
LEUKOCYTE ESTERASE UR QL STRIP.AUTO: NEGATIVE
LIPID PROFILE,FLP: ABNORMAL
LYMPHOCYTES # BLD: 2.2 K/UL (ref 0.9–3.6)
LYMPHOCYTES NFR BLD: 33 % (ref 21–52)
MCH RBC QN AUTO: 32 PG (ref 24–34)
MCHC RBC AUTO-ENTMCNC: 32.8 G/DL (ref 31–37)
MCV RBC AUTO: 97.6 FL (ref 74–97)
MONOCYTES # BLD: 0.7 K/UL (ref 0.05–1.2)
MONOCYTES NFR BLD: 11 % (ref 3–10)
NEUTS SEG # BLD: 3.4 K/UL (ref 1.8–8)
NEUTS SEG NFR BLD: 52 % (ref 40–73)
NITRITE UR QL STRIP.AUTO: NEGATIVE
PH UR STRIP: 6.5 [PH] (ref 5–8)
PLATELET # BLD AUTO: 186 K/UL (ref 135–420)
PMV BLD AUTO: 12.3 FL (ref 9.2–11.8)
POTASSIUM SERPL-SCNC: 4.7 MMOL/L (ref 3.5–5.5)
PROT SERPL-MCNC: 7.1 G/DL (ref 6.4–8.2)
PROT UR STRIP-MCNC: NEGATIVE MG/DL
RBC # BLD AUTO: 5.09 M/UL (ref 4.7–5.5)
SODIUM SERPL-SCNC: 142 MMOL/L (ref 136–145)
SP GR UR REFRACTOMETRY: 1.03 (ref 1–1.03)
T4 FREE SERPL-MCNC: 1.1 NG/DL (ref 0.7–1.5)
TRIGL SERPL-MCNC: 118 MG/DL (ref ?–150)
TSH SERPL DL<=0.05 MIU/L-ACNC: 0.97 UIU/ML (ref 0.36–3.74)
UROBILINOGEN UR QL STRIP.AUTO: 0.2 EU/DL (ref 0.2–1)
VLDLC SERPL CALC-MCNC: 23.6 MG/DL
WBC # BLD AUTO: 6.5 K/UL (ref 4.6–13.2)

## 2018-02-14 PROCEDURE — 36415 COLL VENOUS BLD VENIPUNCTURE: CPT | Performed by: INTERNAL MEDICINE

## 2018-02-14 PROCEDURE — 82306 VITAMIN D 25 HYDROXY: CPT | Performed by: INTERNAL MEDICINE

## 2018-02-14 PROCEDURE — 81003 URINALYSIS AUTO W/O SCOPE: CPT | Performed by: INTERNAL MEDICINE

## 2018-02-14 PROCEDURE — 83036 HEMOGLOBIN GLYCOSYLATED A1C: CPT | Performed by: INTERNAL MEDICINE

## 2018-02-14 PROCEDURE — 80061 LIPID PANEL: CPT | Performed by: INTERNAL MEDICINE

## 2018-02-14 PROCEDURE — 80053 COMPREHEN METABOLIC PANEL: CPT | Performed by: INTERNAL MEDICINE

## 2018-02-14 PROCEDURE — 85025 COMPLETE CBC W/AUTO DIFF WBC: CPT | Performed by: INTERNAL MEDICINE

## 2018-02-14 PROCEDURE — 84439 ASSAY OF FREE THYROXINE: CPT | Performed by: INTERNAL MEDICINE

## 2018-02-17 DIAGNOSIS — E55.9 VITAMIN D DEFICIENCY: Primary | ICD-10-CM

## 2018-02-17 DIAGNOSIS — R73.03 PREDIABETES: Chronic | ICD-10-CM

## 2018-02-17 RX ORDER — CHOLECALCIFEROL (VITAMIN D3) 125 MCG
5000 CAPSULE ORAL DAILY
Qty: 365 CAP | Refills: 99 | COMMUNITY
End: 2019-08-23 | Stop reason: ALTCHOICE

## 2018-02-18 NOTE — PROGRESS NOTES
Call patient with results/recommendations:    Vitamin D level is low at 15. Recommend OTC (over the counter) Vitamin D3 5000 units by mouth daily. Prediabetic range blood sugars.   Reduce intake of processed foods (especially processed carbs) and fried foods, increase use of fresh/unprocessed vegetables in diet    LDL cholesterol has improved

## 2018-03-02 ENCOUNTER — OFFICE VISIT (OUTPATIENT)
Dept: FAMILY MEDICINE CLINIC | Age: 58
End: 2018-03-02

## 2018-03-02 VITALS
RESPIRATION RATE: 20 BRPM | WEIGHT: 246 LBS | HEIGHT: 70 IN | DIASTOLIC BLOOD PRESSURE: 70 MMHG | SYSTOLIC BLOOD PRESSURE: 110 MMHG | BODY MASS INDEX: 35.22 KG/M2 | TEMPERATURE: 97.5 F | OXYGEN SATURATION: 97 % | HEART RATE: 70 BPM

## 2018-03-02 DIAGNOSIS — R73.03 PREDIABETES: Chronic | ICD-10-CM

## 2018-03-02 DIAGNOSIS — E55.9 VITAMIN D DEFICIENCY: ICD-10-CM

## 2018-03-02 DIAGNOSIS — Z00.00 ROUTINE ADULT HEALTH MAINTENANCE: Primary | ICD-10-CM

## 2018-03-02 DIAGNOSIS — M48.07 SPINAL STENOSIS OF LUMBOSACRAL REGION: ICD-10-CM
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
## 2018-03-02 NOTE — PROGRESS NOTES
Daniel Bowles is a 56 y.o. male (: 1960) presenting to address:    Chief Complaint   Patient presents with    GERD       Vitals:    18 1451   BP: 110/70   Pulse: 70   Resp: 20   Temp: 97.5 °F (36.4 °C)   TempSrc: Oral   SpO2: 97%   Weight: 246 lb (111.6 kg)   Height: 5' 10\" (1.778 m)   PainSc:   0 - No pain       Hearing/Vision:   No exam data present    Learning Assessment:     Learning Assessment 2014   PRIMARY LEARNER Patient   HIGHEST LEVEL OF EDUCATION - PRIMARY LEARNER  -   BARRIERS PRIMARY LEARNER -   PRIMARY LANGUAGE ENGLISH   LEARNER PREFERENCE PRIMARY READING   ANSWERED BY patient   RELATIONSHIP SELF     Depression Screening:     PHQ over the last two weeks 3/2/2018   Little interest or pleasure in doing things Not at all   Feeling down, depressed or hopeless Not at all   Total Score PHQ 2 0     Fall Risk Assessment:   No flowsheet data found. Abuse Screening:     Abuse Screening Questionnaire 3/2/2018   Do you ever feel afraid of your partner? N   Are you in a relationship with someone who physically or mentally threatens you? N   Is it safe for you to go home? Y     Coordination of Care Questionaire:   1. Have you been to the ER, urgent care clinic since your last visit? Hospitalized since your last visit? NO    2. Have you seen or consulted any other health care providers outside of the [address] [place] [address] since your last visit? Include any pap smears or colon screening. NO    Advanced Directive:   1. Do you have an Advanced Directive? YES    2. Would you like information on Advanced Directives?  NO

## 2018-03-02 NOTE — MR AVS SNAPSHOT
[address] [address] [place]  [address] [facility] [address] [phone] Patient: Stacey Main MRN: [id number] Jeffery Orlando Visit Information Date & Time Provider Department Dept. Phone Encounter #  
 3/2/2018  3:00 PM Olayinka Aviles [phone] [id number] Upcoming Health Maintenance Date Due COLONOSCOPY 6/18/2023* DTaP/Tdap/Td series (2 - Td) 2/9/2028 *Topic was postponed. The date shown is not the original due date. Allergies as of 3/2/2018  Review Complete On: 3/2/2018 By: Lencho Asencio LPN Severity Noted Reaction Type Reactions Lyrica [Pregabalin] Low 04/17/2017   Side Effect Other (comments) Weight gain Current Immunizations  Never Reviewed Name Date Influenza Vaccine 11/18/2017 Tdap 2/9/2018 Not reviewed this visit You Were Diagnosed With   
  
 Codes Comments Routine adult health maintenance    -  Primary ICD-10-CM: Z00.00 ICD-9-CM: V70.0 Vitals BP Pulse Temp Resp Height(growth percentile) Weight(growth percentile) 110/70 (BP 1 Location: Left arm, BP Patient Position: Sitting) 70 97.5 °F (36.4 °C) (Oral) 20 5' 10\" (1.778 m) 246 lb (111.6 kg) SpO2 BMI Smoking Status 97% 35.3 kg/m2 Never Smoker Vitals History BMI and BSA Data Body Mass Index Body Surface Area  
 35.3 kg/m 2 2.35 m 2 Preferred Pharmacy Pharmacy Name Phone [address] [facility] [phone] Your Updated Medication List  
  
   
This list is accurate as of 3/2/18  3:47 PM.  Always use your most recent med list.  
  
  
  
  
 ALEVE 220 mg tablet Generic drug:  naproxen sodium Take 2,000 mg by mouth two (2) times daily (with meals). cholecalciferol 5,000 unit capsule Commonly known as:  VITAMIN D3 Take 1 Cap by mouth daily. cpap machine kit by Does Not Apply route. G47.33 - Update CPAP set @ Auto, 10-15cm H20, heated humidifier, tubing, climate line, filters (6), chinstrap, integrated heated humidifier. Authorized for 2nd mask and headgear within one year if needed  
  
 triamcinolone acetonide 0.5 % ointment Commonly known as:  KENALOG Apply  to affected area three (3) times daily. use thin layer to affected area on left forearm Patient Instructions OTC (over the counter) Vitamin D3 5000 units by mouth daily. Prediabetic range blood sugars. Reduce intake of processed foods (especially processed carbs) and fried foods, increase use of fresh/unprocessed vegetables in diet 
  
LDL cholesterol has improved - good job! Follow up yearly for preventive visit, sooner as needed Vitamin D Deficiency & Pain Vitamin D deficiency is associated with persistent, nonspecific musculoskeletal pain refractory to pharmacotherapy [1]. Pain patients with vitamin D deficiency are less responsive to treatment with pain medications compared to patients with normal vitamin D levels [2]. Vitamin D is essential for normal muscle function and bone formation, maintenance, and remodeling, and it possesses anti-inflammatory properties through regulation of interleukin (IL), tumor necrosis factor (TNF), and macrophage activity. Vitamin D deficiency (hypovitaminosis D) is highly prevalent in patients with back pain, and symptoms often resolve three to seven months after vitamin D treatment. [2-4]. 1. Renee JOSEPH, Tim Putnam. Prevalence of severe hypovitaminosis D in patients with persistent, nonspecific musculoskeletal pain. Fernández Clin Proc. 2003;78(12):0009-6123. 
2. [address], Elaine WRIGHT, Rafaela Barger, Michael Ramos, Naomi CO, Paul BK. Prevalence and clinical correlates of vitamin D inadequacy among patients with chronic pain. Pain Med. 2008;9(8):979-984. 3. Priscila Mays Vitamin D status and spine surgery outcomes. ISRN Orthopedics. 8171;343181:7-27. 4. Maritza TH, Kevin Huitron, Silverio HM, et al. Prevalence of vitamin D deficiency in patients with lumbar spinal stenosis and its relationship with pain. Pain Physician. 2013;16(2):165-176. Introducing [facility] SERVICES! Dear Eileen Troncoso: Thank you for requesting a [employer] account. Our records indicate that you already have an active [employer] account. You can access your account anytime at [email or web address] Did you know that you can access your hospital and ER discharge instructions at any time in [employer]yes? You can also review all of your test results from your hospital stay or ER visit. Additional Information If you have questions, please visit the Frequently Asked Questions section of the [employer] website at [email or web address] Remember, [employer]ath[employer]Eyes is NOT to be used for urgent needs. For medical emergencies, dial 911. Now available from your iPhone and Android! Please provide this summary of care documentation to your next provider. Your primary care clinician is listed as Drake Dunham. If you have any questions after today's visit, please call [phone].

## 2018-03-02 NOTE — PATIENT INSTRUCTIONS
OTC (over the counter) Vitamin D3 5000 units by mouth daily. Prediabetic range blood sugars. Reduce intake of processed foods (especially processed carbs) and fried foods, increase use of fresh/unprocessed vegetables in diet     LDL cholesterol has improved - good job! Follow up yearly for preventive visit, sooner as needed       Vitamin D Deficiency & Pain       Vitamin D deficiency is associated with persistent, nonspecific musculoskeletal pain refractory to pharmacotherapy [1]. Pain patients with vitamin D deficiency are less responsive to treatment with pain medications compared to patients with normal vitamin D levels [2]. Vitamin D is essential for normal muscle function and bone formation, maintenance, and remodeling, and it possesses anti-inflammatory properties through regulation of interleukin (IL), tumor necrosis factor (TNF), and macrophage activity. Vitamin D deficiency (hypovitaminosis D) is highly prevalent in patients with back pain, and symptoms often resolve three to seven months after vitamin D treatment. [2-4]. 1. Renee JOSEPH, Ray Stevenson. Prevalence of severe hypovitaminosis D in patients with persistent, nonspecific musculoskeletal pain. Fernández Clin Proc. 2003;78(12):1462-0556.  2. Davis [address], Elaine WM, Ryan Larry, Naomi CO, Paul BK. Prevalence and clinical correlates of vitamin D inadequacy among patients with chronic pain. Pain Med. 2008;9(8):979-984. 3. Escobar Tan Vitamin D status and spine surgery outcomes. ISRN Orthopedics. 5733;410731:2-70. 4. Maritza TH, Acosta Gaviria, Silverio NGUYEN, et al. Prevalence of vitamin D deficiency in patients with lumbar spinal stenosis and its relationship with pain. Pain Physician. 2013;16(2):165-176.

## 2018-05-28 ENCOUNTER — PATIENT MESSAGE (OUTPATIENT)
Dept: FAMILY MEDICINE CLINIC | Age: 58
End: 2018-05-28

## 2018-05-30 ENCOUNTER — OFFICE VISIT (OUTPATIENT)
Dept: FAMILY MEDICINE CLINIC | Age: 58
End: 2018-05-30

## 2018-05-30 VITALS
WEIGHT: 246.2 LBS | HEART RATE: 56 BPM | RESPIRATION RATE: 18 BRPM | DIASTOLIC BLOOD PRESSURE: 77 MMHG | HEIGHT: 70 IN | OXYGEN SATURATION: 96 % | TEMPERATURE: 96.4 F | SYSTOLIC BLOOD PRESSURE: 129 MMHG | BODY MASS INDEX: 35.24 KG/M2

## 2018-05-30 DIAGNOSIS — L23.7 POISON OAK DERMATITIS: Primary | ICD-10-CM

## 2018-05-30 DIAGNOSIS — L30.0 NUMMULAR ECZEMATOUS DERMATITIS: ICD-10-CM

## 2018-05-30 RX ORDER — TRIAMCINOLONE ACETONIDE 5 MG/G
OINTMENT TOPICAL 3 TIMES DAILY
Qty: 30 G | Refills: 0 | Status: SHIPPED | OUTPATIENT
Start: 2018-05-30 | End: 2018-12-26

## 2018-05-30 RX ORDER — TRIAMCINOLONE ACETONIDE 5 MG/G
OINTMENT TOPICAL 3 TIMES DAILY
Qty: 30 G | Refills: 0 | Status: SHIPPED | OUTPATIENT
Start: 2018-05-30 | End: 2018-05-30 | Stop reason: SDUPTHER

## 2018-05-30 NOTE — TELEPHONE ENCOUNTER
From: Bianca Lafleur  To: Brunilda John MD  Sent: 5/28/2018 9:21 AM EDT  Subject: Prescription Question    Dr. Harry Raya,  I have recently come in contact with \"poison oak\" and was wondering if you could phone in a prescription for me. My wife who also came in contact was prescribed Methylprednisol 4MG which has done wonders for her. If you need to see me first; I need the first available appt or I can walk-in as a standby appointment.     Delma Peace

## 2018-05-30 NOTE — PROGRESS NOTES
Subjective:      Melonie Gonzalez is a 56 y.o. male who presents for evaluation of rash. Rash started several days ago, after exposure to poison oak in the backyard of house. He states he had several small streaking areas with blistering rash the day after being in the yard. He denies pain , + itch, has not taken anything for it. Review of Systems  Pertinent items are noted in HPI. Objective:     Visit Vitals    /77 (BP 1 Location: Left arm, BP Patient Position: Sitting)    Pulse (!) 56    Temp 96.4 °F (35.8 °C) (Oral)    Resp 18    Ht 5' 10\" (1.778 m)    Wt 246 lb 3.2 oz (111.7 kg)    SpO2 96%    BMI 35.33 kg/m2     General:  alert, cooperative, no distress, appears stated age   Primary findings:   vesicles, streaking rash erythematous scattered on bilateral upper extremities and forearm     Assessment:     Poison oak    Plan:   Aveeno baths  Benadryl prn for itching.   Rx: steroid cream tid as needed until healed   rtc as needed

## 2018-05-30 NOTE — PROGRESS NOTES
Edita Melton is a 56 y.o. male (: 1960) presenting to address rash on hands and arms. Rash is itching. Chief Complaint   Patient presents with    Rash     on hands and arms times 5 days. Vitals:    18 1430   BP: 129/77   Pulse: (!) 56   Resp: 18   Temp: 96.4 °F (35.8 °C)   TempSrc: Oral   SpO2: 96%   Weight: 246 lb 3.2 oz (111.7 kg)   Height: 5' 10\" (1.778 m)   PainSc:   0 - No pain       Hearing/Vision:   No exam data present    Learning Assessment:     Learning Assessment 2014   PRIMARY LEARNER Patient   HIGHEST LEVEL OF EDUCATION - PRIMARY LEARNER  -   BARRIERS PRIMARY LEARNER -   PRIMARY LANGUAGE ENGLISH   LEARNER PREFERENCE PRIMARY READING   ANSWERED BY patient   RELATIONSHIP SELF     Depression Screening:     PHQ over the last two weeks 2018   PHQ Not Done Medical Reason (indicate in comments)   Little interest or pleasure in doing things Not at all   Feeling down, depressed or hopeless Not at all   Total Score PHQ 2 0     Fall Risk Assessment:   No flowsheet data found. Abuse Screening:     Abuse Screening Questionnaire 3/2/2018   Do you ever feel afraid of your partner? N   Are you in a relationship with someone who physically or mentally threatens you? N   Is it safe for you to go home? Y     Coordination of Care Questionaire:   1. Have you been to the ER, urgent care clinic since your last visit? Hospitalized since your last visit? NO    2. Have you seen or consulted any other health care providers outside of the [facility] since your last visit? Include any pap smears or colon screening. NO    Advanced Directive:   1. Do you have an Advanced Directive? YES    2. Would you like information on Advanced Directives?  NO

## 2018-05-30 NOTE — TELEPHONE ENCOUNTER
Regarding: RE: Prescription Question  Contact: [phone]  ----- Message from Marcie Ulloa LPN sent at 6/35/7521  8:49 AM EDT -----       ----- Message from Nancy Rodarte to Avel Giles MD sent at 5/29/2018  5:27 PM -----   Dr Jt Barker,  I sent an email expecting a reply for my current issue and instead I received a note from BLAIRE Garner indicating you would provide a response once you decided what to do. I called later this afternoon only to be told that the office was closed and I would need to wait until tomorrow morning. The lack of response now requires an additional day to solve my issue and discomfort. Can you please respond on whether or not for me to make an appointment, or can you call in my prescription to [facility] [place])? Claudia Esparza  ----- Message -----  From: Luis Vergara LPN  Sent: 4/20/7849  8:46 AM EDT  To: Moraima Puente  Subject: RE: Prescription Question    Mr. Mignon Fish,    I have forwarded your message to Dr. Jt Barker. As soon as she lets us know what she would like to do we will contact you. Respectfully,  Keny Jones LPN      ----- Message -----     From: Marisel Puente     Sent: 5/28/2018  9:21 AM EDT       To: Avel Giles MD  Subject: Prescription Question    Dr. Jt Barker,  I have recently come in contact with \"poison oak\" and was wondering if you could phone in a prescription for me. My wife who also came in contact was prescribed Methylprednisol 4MG which has done wonders for her. If you need to see me first; I need the first available appt or I can walk-in as a standby appointment.     Claudia Esparza

## 2018-05-30 NOTE — TELEPHONE ENCOUNTER
Please call Brett Munroe    \"Dr. Bishop Shah is out of the office today, but Dr. Hoang Bolanos spoke with her and she advises that you come in for evaluation, as the difference between oral steroids and topical steroids depends on the extent and severity of your symptoms. ... And oral steroids certainly can come with significant side effects that should not be taken lightly. \"    Please offer same day appt.    -----------------------------------------    Bishop Shah recs: \"if >5% BSA or face / genital involvement, then PO steroid is reasonable, otherwise should only have topical steroid. \"

## 2018-05-30 NOTE — TELEPHONE ENCOUNTER
Regarding: RE: Prescription Question  ----- Message from Jeniffer Melendez MD sent at 5/30/2018  9:17 AM EDT -----       ----- Message from Joselito Wei to Evonne Orta MD sent at 5/29/2018  5:27 PM -----   Dr Carole Bañuelos,  I sent an email expecting a reply for my current issue and instead I received a note from BLAIRE Garner indicating you would provide a response once you decided what to do. I called later this afternoon only to be told that the office was closed and I would need to wait until tomorrow morning. The lack of response now requires an additional day to solve my issue and discomfort. Can you please respond on whether or not for me to make an appointment, or can you call in my prescription to [facility])? Wilbur Grimm  ----- Message -----  From: Jorge Houser LPN  Sent: 1/13/5621  8:46 AM EDT  To: Pillo Puente  Subject: RE: Prescription Question    Mr. Any Dixon,    I have forwarded your message to Dr. Carole Bañuelos. As soon as she lets us know what she would like to do we will contact you. Respectfully,  Jacki Hardy LPN      ----- Message -----     From: Alexander Puente     Sent: 5/28/2018  9:21 AM EDT       To: Evonne Orta MD  Subject: Prescription Question    Dr. Carole Bañuelos,  I have recently come in contact with \"poison oak\" and was wondering if you could phone in a prescription for me. My wife who also came in contact was prescribed Methylprednisol 4MG which has done wonders for her. If you need to see me first; I need the first available appt or I can walk-in as a standby appointment.     Wilbur Grimm

## 2018-05-30 NOTE — PATIENT INSTRUCTIONS
Poison [place], [place], and [place]: Care Instructions  Your Care Instructions    Poison ivy, poison oak, and poison sumac are plants that can cause a skin rash upon contact. The red, itchy rash often shows up in lines or streaks and may cause fluid-filled blisters or large, raised hives. The rash is caused by an allergic reaction to an oil in poison ivy, oak, and sumac. The rash may occur when you touch the plant or when you touch clothing, pet fur, sporting gear, gardening tools, or other objects that have come in contact with one of these plants. You cannot catch or spread the rash, even if you touch it or the blister fluid, because the plant oil will already have been absorbed or washed off the skin. The rash may seem to be spreading, but either it is still developing from earlier contact or you have touched something that still has the plant oil on it. Follow-up care is a key part of your treatment and safety. Be sure to make and go to all appointments, and call your doctor if you are having problems. It's also a good idea to know your test results and keep a list of the medicines you take. How can you care for yourself at home? · If your doctor prescribed a cream, use it as directed. If your doctor prescribed medicine, take it exactly as prescribed. Call your doctor if you think you are having a problem with your medicine. · Use cold, wet cloths to reduce itching. · Keep cool, and stay out of the sun. · Leave the rash open to the air. · Wash all clothing or other things that may have come in contact with the plant oil. · Avoid most lotions and ointments until the rash heals. Calamine lotion may help relieve symptoms of a plant rash. Use it 3 or 4 times a day. To prevent poison ivy exposure  If you know that you will be near poison ivy, oak, or sumac, you can try these options:  · Use a product designed to help prevent plant oil from getting on the skin.  These products, such as Ivy X Pre-Contact Skin Solution, come in lotions, sprays, or towelettes. You put the product on your skin right before you go outdoors. · If you did not use a preventive product and you have had contact with plant oil, clean it off your skin as soon as possible. Use a product such as Tecnu Original Outdoor Skin Cleanser. These products can also be used to clean plant oil from clothing or tools. When should you call for help? Call your doctor now or seek immediate medical care if:  ? · Your rash gets worse, and you start to feel bad and have a fever, a stiff neck, nausea, and vomiting. ? · You have signs of infection, such as:  ¨ Increased pain, swelling, warmth, or redness. ¨ Red streaks leading from the rash. ¨ Pus draining from the rash. ¨ A fever. ? Watch closely for changes in your health, and be sure to contact your doctor if:  ? · You have new blisters or bruises, or the rash spreads and looks like a sunburn. ? · The rash gets worse, or it comes back after nearly disappearing. ? · You think a medicine you are using is making your rash worse. ? · Your rash does not clear up after 1 to 2 weeks of home treatment. ? · You have joint aches or body aches with your rash. Where can you learn more? Go to http://beny-emely.info/. Enter R264 in the search box to learn more about \"Poison [place], [place], and [place]: Care Instructions. \"  Current as of: October 13, 2016  Content Version: 11.4  © 5412-7412 [employer]. Care instructions adapted under license by [employer] (which disclaims liability or warranty for this information). If you have questions about a medical condition or this instruction, always ask your healthcare professional. [place] [address] any warranty or liability for your use of this information.

## 2018-05-30 NOTE — MR AVS SNAPSHOT
Laz Ferrer 
 
 
 [address] [facility] [id number] [phone] Patient: Joselito Wei MRN: [id number] Jose Contreras Visit Information Date & Time Provider Department Dept. Phone Encounter #  
 5/30/2018  2:30 PM Denise Jerome [employer] [phone] [id number] Follow-up Instructions Return if symptoms worsen or fail to improve. Upcoming Health Maintenance Date Due COLONOSCOPY 6/18/2023* Influenza Age 5 to Adult 8/1/2018 DTaP/Tdap/Td series (2 - Td) 2/9/2028 *Topic was postponed. The date shown is not the original due date. Allergies as of 5/30/2018  Review Complete On: 5/30/2018 By: Denise Jerome NP Severity Noted Reaction Type Reactions Lyrica [Pregabalin] Low 04/17/2017   Side Effect Other (comments) Weight gain Current Immunizations  Never Reviewed Name Date Influenza Vaccine 11/18/2017 Tdap 2/9/2018 Not reviewed this visit You Were Diagnosed With   
  
 Codes Comments Poison oak dermatitis    -  Primary ICD-10-CM: L23.7 ICD-9-CM: 692.6 Nummular eczematous dermatitis     ICD-10-CM: L30.0 ICD-9-CM: 692.9 Vitals BP Pulse Temp Resp Height(growth percentile) Weight(growth percentile) 129/77 (BP 1 Location: Left arm, BP Patient Position: Sitting) (!) 56 96.4 °F (35.8 °C) (Oral) 18 5' 10\" (1.778 m) 246 lb 3.2 oz (111.7 kg) SpO2 BMI Smoking Status 96% 35.33 kg/m2 Never Smoker BMI and BSA Data Body Mass Index Body Surface Area  
 35.33 kg/m 2 2.35 m 2 Preferred Pharmacy Pharmacy Name Phone [facility] [phone] [phone] Your Updated Medication List  
  
   
This list is accurate as of 5/30/18  2:50 PM.  Always use your most recent med list.  
  
  
  
  
 ALEVE 220 mg tablet Generic drug:  naproxen sodium Take 2,000 mg by mouth two (2) times daily (with meals). cholecalciferol 5,000 unit capsule Commonly known as:  VITAMIN D3 Take 1 Cap by mouth daily. cpap machine kit  
by Does Not Apply route. G47.33 - Update CPAP set @ Auto, 10-15cm H20, heated humidifier, tubing, climate line, filters (6), chinstrap, integrated heated humidifier. Authorized for 2nd mask and headgear within one year if needed  
  
 triamcinolone acetonide 0.5 % ointment Commonly known as:  KENALOG Apply  to affected area three (3) times daily. use thin layer to affected area on left forearm Prescriptions Sent to Pharmacy Refills  
 triamcinolone acetonide (KENALOG) 0.5 % ointment 0 Sig: Apply  to affected area three (3) times daily. use thin layer to affected area on left forearm Class: Normal  
 Pharmacy: [facility] [address]  [facility] Ph #: [phone] Route: Topical  
  
Follow-up Instructions Return if symptoms worsen or fail to improve. Patient Instructions Poison [place], [place], and [place]: Care Instructions Your Care Instructions Poison ivy, poison oak, and poison sumac are plants that can cause a skin rash upon contact. The red, itchy rash often shows up in lines or streaks and may cause fluid-filled blisters or large, raised hives. The rash is caused by an allergic reaction to an oil in poison ivy, oak, and sumac. The rash may occur when you touch the plant or when you touch clothing, pet fur, sporting gear, gardening tools, or other objects that have come in contact with one of these plants. You cannot catch or spread the rash, even if you touch it or the blister fluid, because the plant oil will already have been absorbed or washed off the skin.  The rash may seem to be spreading, but either it is still developing from earlier contact or you have touched something that still has the plant oil on it. Follow-up care is a key part of your treatment and safety. Be sure to make and go to all appointments, and call your doctor if you are having problems. It's also a good idea to know your test results and keep a list of the medicines you take. How can you care for yourself at home? · If your doctor prescribed a cream, use it as directed. If your doctor prescribed medicine, take it exactly as prescribed. Call your doctor if you think you are having a problem with your medicine. · Use cold, wet cloths to reduce itching. · Keep cool, and stay out of the sun. · Leave the rash open to the air. · Wash all clothing or other things that may have come in contact with the plant oil. · Avoid most lotions and ointments until the rash heals. Calamine lotion may help relieve symptoms of a plant rash. Use it 3 or 4 times a day. To prevent poison ivy exposure If you know that you will be near poison ivy, oak, or sumac, you can try these options: · Use a product designed to help prevent plant oil from getting on the skin. These products, such as Ivy X Pre-Contact Skin Solution, come in lotions, sprays, or towelettes. You put the product on your skin right before you go outdoors. · If you did not use a preventive product and you have had contact with plant oil, clean it off your skin as soon as possible. Use a product such as Tecnu Original Outdoor Skin Cleanser. These products can also be used to clean plant oil from clothing or tools. When should you call for help? Call your doctor now or seek immediate medical care if: 
? · Your rash gets worse, and you start to feel bad and have a fever, a stiff neck, nausea, and vomiting. ? · You have signs of infection, such as: 
¨ Increased pain, swelling, warmth, or redness. ¨ Red streaks leading from the rash. ¨ Pus draining from the rash. ¨ A fever. ?Watch closely for changes in your health, and be sure to contact your doctor if: 
? · You have new blisters or bruises, or the rash spreads and looks like a sunburn. ? · The rash gets worse, or it comes back after nearly disappearing. ? · You think a medicine you are using is making your rash worse. ? · Your rash does not clear up after 1 to 2 weeks of home treatment. ? · You have joint aches or body aches with your rash. Where can you learn more? Go to http://beny-emely.info/. Enter S287 in the search box to learn more about \"Poison [place], [place], and [place]: Care Instructions. \" Current as of: October 13, 2016 Content Version: 11.4 © 2733-5179 [employer]. Care instructions adapted under license by [employer] (which disclaims liability or warranty for this information). If you have questions about a medical condition or this instruction, always ask your healthcare professional. [address] any warranty or liability for your use of this information. Introducing [facility]! Dear Johanny Msutafa: Thank you for requesting a [employer] account. Our records indicate that you already have an active Iter[employer] account. You can access your account anytime at [email or web address] Did you know that you can access your hospital and ER discharge instructions at any time in Iterasi? You can also review all of your test results from your hospital stay or ER visit. Additional Information If you have questions, please visit the Frequently Asked Questions section of the [employer] website at [email or web address] Remember, Iterasi is NOT to be used for urgent needs. For medical emergencies, dial 911. Now available from your iPhone and Android! Please provide this summary of care documentation to your next provider. Your primary care clinician is listed as Kathleen Curiel.  If you have any questions after today's visit, please call [phone].

## 2018-07-06 ENCOUNTER — TELEPHONE (OUTPATIENT)
Dept: FAMILY MEDICINE CLINIC | Age: 58
End: 2018-07-06

## 2018-07-06 NOTE — TELEPHONE ENCOUNTER
Patient states he was told by his eye doctor that he has pink eye. He would like to know if an antibiotic can be called in for him without being seen today, since Dr. Bernadine Terry scheduled is full.

## 2018-07-06 NOTE — TELEPHONE ENCOUNTER
Nearly all pink eye is viral and doesn't require an antibiotic, just warm compresses and gentle cleansing (I prefer Robert and Robert foaming baby wash in the yellow pump bottle while showering) - reassure patient that his eye doctor would have treated him if he were concerned about a bacterial eye infection.

## 2018-12-17 ENCOUNTER — OFFICE VISIT (OUTPATIENT)
Dept: FAMILY MEDICINE CLINIC | Age: 58
End: 2018-12-17

## 2018-12-17 VITALS
HEIGHT: 70 IN | OXYGEN SATURATION: 95 % | HEART RATE: 60 BPM | RESPIRATION RATE: 18 BRPM | TEMPERATURE: 97.6 F | BODY MASS INDEX: 34.62 KG/M2 | DIASTOLIC BLOOD PRESSURE: 68 MMHG | SYSTOLIC BLOOD PRESSURE: 120 MMHG | WEIGHT: 241.8 LBS

## 2018-12-17 DIAGNOSIS — J09.X2 INFLUENZA A (H5N1): Primary | ICD-10-CM

## 2018-12-17 DIAGNOSIS — R05.9 COUGH: ICD-10-CM

## 2018-12-17 LAB
FLUAV+FLUBV AG NOSE QL IA.RAPID: NEGATIVE POS/NEG
FLUAV+FLUBV AG NOSE QL IA.RAPID: POSITIVE POS/NEG
VALID INTERNAL CONTROL?: YES

## 2018-12-17 RX ORDER — OSELTAMIVIR PHOSPHATE 75 MG/1
75 CAPSULE ORAL 2 TIMES DAILY
Qty: 10 CAP | Refills: 0 | Status: SHIPPED | OUTPATIENT
Start: 2018-12-17 | End: 2018-12-22

## 2018-12-17 NOTE — PROGRESS NOTES
PRE-VISIT PLANNING:     PATIENT NAME:  Lolly Hastings   :  1960   PCP:  Blayne Morgan MD     Future Appointments   Date Time Provider Joey Españai   2018 11:30 AM Blayne Morgan MD [facility] is scheduled for an office visit with Monica Cuevas MD on 2018 for same day evaluation of cold symptoms. Encounter opened prior to visit to complete pre-visit planning. Last office visit with PCP was 3/2/18. Pending issues:  Health Maintenance Due   Topic Date Due    Shingrix Vaccine Age 49> (1 of 2) 2012    Influenza Age 5 to Adult  2018       Rooming Nurse:     -- Reminder to complete flu shot when acute illness resolves (if not done already)  -- Release for Shingrix vaccine admin if done         [facility]  [facility] [address], [address] [place] [address]  Phone [phone]; Fax [phone]    Date of Service:  2018  Patient's Name & :   Lolly Hastings - 1960   Patient's PCP:  Blayne Morgan MD     Assessment/Plan:       Lolly Hastings was seen today for acute care. The primary encounter diagnosis was Influenza A (H5N1). A diagnosis of Cough was also pertinent to this visit. POC testing positive for Flu A, tx with tamiflu   Symptomatic management, rest and fluids. Recent Results (from the past 12 hour(s))   AMB POC PAPA INFLUENZA A/B TEST    Collection Time: 18 11:44 AM   Result Value Ref Range    VALID INTERNAL CONTROL POC Yes     Influenza A Ag POC Positive Negative Pos/Neg    Influenza B Ag POC Negative Negative Pos/Neg       Orders Placed This Encounter    AMB POC PAPA INFLUENZA A/B TEST    oseltamivir (TAMIFLU) 75 mg capsule        Patient Instructions          Influenza (Flu): Care Instructions  Your Care Instructions    Influenza (flu) is an infection in the lungs and breathing passages.  It is caused by the influenza virus. There are different strains, or types, of the flu virus from year to year. Unlike the common cold, the flu comes on suddenly and the symptoms, such as a cough, congestion, fever, chills, fatigue, aches, and pains, are more severe. These symptoms may last up to 10 days. Although the flu can make you feel very sick, it usually doesn't cause serious health problems. Home treatment is usually all you need for flu symptoms. But your doctor may prescribe antiviral medicine to prevent other health problems, such as pneumonia, from developing. Older people and those who have a long-term health condition, such as lung disease, are most at risk for having pneumonia or other health problems. Follow-up care is a key part of your treatment and safety. Be sure to make and go to all appointments, and call your doctor if you are having problems. It's also a good idea to know your test results and keep a list of the medicines you take. How can you care for yourself at home? · Get plenty of rest.  · Drink plenty of fluids, enough so that your urine is light yellow or clear like water. If you have kidney, heart, or liver disease and have to limit fluids, talk with your doctor before you increase the amount of fluids you drink. · Take an over-the-counter pain medicine if needed, such as acetaminophen (Tylenol), ibuprofen (Advil, Motrin), or naproxen (Aleve), to relieve fever, headache, and muscle aches. Read and follow all instructions on the label. No one younger than 20 should take aspirin. It has been linked to Reye syndrome, a serious illness. · Do not smoke. Smoking can make the flu worse. If you need help quitting, talk to your doctor about stop-smoking programs and medicines. These can increase your chances of quitting for good. · Breathe moist air from a hot shower or from a sink filled with hot water to help clear a stuffy nose. · Before you use cough and cold medicines, check the label.  These medicines may not be safe for young children or for people with certain health problems. · If the skin around your nose and lips becomes sore, put some petroleum jelly on the area. · To ease coughing:  ? Drink fluids to soothe a scratchy throat. ? Suck on cough drops or plain hard candy. ? Take an over-the-counter cough medicine that contains dextromethorphan to help you get some sleep. Read and follow all instructions on the label. ? Raise your head at night with an extra pillow. This may help you rest if coughing keeps you awake. · Take any prescribed medicine exactly as directed. Call your doctor if you think you are having a problem with your medicine. To avoid spreading the flu  · Wash your hands regularly, and keep your hands away from your face. · Stay home from school, work, and other public places until you are feeling better and your fever has been gone for at least 24 hours. The fever needs to have gone away on its own without the help of medicine. · Ask people living with you to talk to their doctors about preventing the flu. They may get antiviral medicine to keep from getting the flu from you. · To prevent the flu in the future, get a flu vaccine every fall. Encourage people living with you to get the vaccine. · Cover your mouth when you cough or sneeze. When should you call for help? Call 911 anytime you think you may need emergency care. For example, call if:    · You have severe trouble breathing.    Call your doctor now or seek immediate medical care if:    · You have new or worse trouble breathing.     · You seem to be getting much sicker.     · You feel very sleepy or confused.     · You have a new or higher fever.     · You get a new rash.    Watch closely for changes in your health, and be sure to contact your doctor if:    · You begin to get better and then get worse.     · You are not getting better after 1 week. Where can you learn more?   Go to http://beny-emely.info/. Enter G004 in the search box to learn more about \"Influenza (Flu): Care Instructions. \"  Current as of: December 6, 2017  Content Version: 11.8  © 4842-0706 [employer]. Care instructions adapted under license by [employer] (which disclaims liability or warranty for this information). If you have questions about a medical condition or this instruction, always ask your healthcare professional. [place] [address] any warranty or liability for your use of this information. The patient states understanding of recommended treatment plan. Les To MD - Internal Medicine  12/17/2018, 8:52 AM       Subjective/Discussion        Chief Complaint   Patient presents with    Cough    Cold Symptoms       Ana Alves is a 66 y.o. male who presents today with flu like symptoms. Started Friday with scratchy throat. Hacking and coughing over the weekend. Review of Systems   Constitutional: Positive for diaphoresis, fever and malaise/fatigue. Respiratory: Positive for cough and sputum production. Negative for hemoptysis, shortness of breath and wheezing. Skin: Negative for itching and rash. Objective:     /68 (BP 1 Location: Left arm, BP Patient Position: Sitting)   Pulse 60   Temp 97.6 °F (36.4 °C) (Oral)   Resp 18   Ht 5' 10\" (1.778 m)   Wt 241 lb 12.8 oz (109.7 kg)   SpO2 95%   BMI 34.69 kg/m²     Physical Exam   Constitutional: He is oriented to person, place, and time. He appears well-developed and well-nourished. He appears listless. He appears ill. No distress. HENT:   Head: Normocephalic and atraumatic. Eyes: Conjunctivae and EOM are normal. Right eye exhibits no discharge. Left eye exhibits no discharge. No scleral icterus. Neck: Neck supple. Pulmonary/Chest: Effort normal.   Neurological: He is oriented to person, place, and time. He appears listless.  He exhibits normal muscle tone. Coordination normal.   Skin: Skin is warm and dry. No rash noted. He is not diaphoretic. No erythema. No pallor. Psychiatric: He has a normal mood and affect. His behavior is normal. Judgment and thought content normal.           Patient's Past Med Hx, Surg Hx, Soc Hx, Family Hx reviewed. Current Outpatient Medications   Medication Sig    oseltamivir (TAMIFLU) 75 mg capsule Take 1 Cap by mouth two (2) times a day for 5 days.  cholecalciferol (VITAMIN D3) 5,000 unit capsule Take 1 Cap by mouth daily.  cpap machine kit by Does Not Apply route. G47.33 - Update CPAP set @ Auto, 10-15cm H20, heated humidifier, tubing, climate line, filters (6), chinstrap, integrated heated humidifier. Authorized for 2nd mask and headgear within one year if needed    naproxen sodium (ALEVE) 220 mg tablet Take 2,000 mg by mouth two (2) times daily (with meals).  triamcinolone acetonide (KENALOG) 0.5 % ointment Apply  to affected area three (3) times daily. use thin layer to affected area on left forearm     No current facility-administered medications for this visit. Allergies   Allergen Reactions    Lyrica [Pregabalin] Other (comments)     Weight gain       Encounter Diagnoses:     Encounter Diagnoses     ICD-10-CM ICD-9-CM   1. Influenza A (H5N1) J09. X2 488.02   2.  Cough R05 786.2

## 2018-12-17 NOTE — PATIENT INSTRUCTIONS
Influenza (Flu): Care Instructions  Your Care Instructions    Influenza (flu) is an infection in the lungs and breathing passages. It is caused by the influenza virus. There are different strains, or types, of the flu virus from year to year. Unlike the common cold, the flu comes on suddenly and the symptoms, such as a cough, congestion, fever, chills, fatigue, aches, and pains, are more severe. These symptoms may last up to 10 days. Although the flu can make you feel very sick, it usually doesn't cause serious health problems. Home treatment is usually all you need for flu symptoms. But your doctor may prescribe antiviral medicine to prevent other health problems, such as pneumonia, from developing. Older people and those who have a long-term health condition, such as lung disease, are most at risk for having pneumonia or other health problems. Follow-up care is a key part of your treatment and safety. Be sure to make and go to all appointments, and call your doctor if you are having problems. It's also a good idea to know your test results and keep a list of the medicines you take. How can you care for yourself at home? · Get plenty of rest.  · Drink plenty of fluids, enough so that your urine is light yellow or clear like water. If you have kidney, heart, or liver disease and have to limit fluids, talk with your doctor before you increase the amount of fluids you drink. · Take an over-the-counter pain medicine if needed, such as acetaminophen (Tylenol), ibuprofen (Advil, Motrin), or naproxen (Aleve), to relieve fever, headache, and muscle aches. Read and follow all instructions on the label. No one younger than 20 should take aspirin. It has been linked to Reye syndrome, a serious illness. · Do not smoke. Smoking can make the flu worse. If you need help quitting, talk to your doctor about stop-smoking programs and medicines. These can increase your chances of quitting for good.   · Breathe moist air from a hot shower or from a sink filled with hot water to help clear a stuffy nose. · Before you use cough and cold medicines, check the label. These medicines may not be safe for young children or for people with certain health problems. · If the skin around your nose and lips becomes sore, put some petroleum jelly on the area. · To ease coughing:  ? Drink fluids to soothe a scratchy throat. ? Suck on cough drops or plain hard candy. ? Take an over-the-counter cough medicine that contains dextromethorphan to help you get some sleep. Read and follow all instructions on the label. ? Raise your head at night with an extra pillow. This may help you rest if coughing keeps you awake. · Take any prescribed medicine exactly as directed. Call your doctor if you think you are having a problem with your medicine. To avoid spreading the flu  · Wash your hands regularly, and keep your hands away from your face. · Stay home from school, work, and other public places until you are feeling better and your fever has been gone for at least 24 hours. The fever needs to have gone away on its own without the help of medicine. · Ask people living with you to talk to their doctors about preventing the flu. They may get antiviral medicine to keep from getting the flu from you. · To prevent the flu in the future, get a flu vaccine every fall. Encourage people living with you to get the vaccine. · Cover your mouth when you cough or sneeze. When should you call for help? Call 911 anytime you think you may need emergency care.  For example, call if:    · You have severe trouble breathing.    Call your doctor now or seek immediate medical care if:    · You have new or worse trouble breathing.     · You seem to be getting much sicker.     · You feel very sleepy or confused.     · You have a new or higher fever.     · You get a new rash.    Watch closely for changes in your health, and be sure to contact your doctor if:    · You begin to get better and then get worse.     · You are not getting better after 1 week. Where can you learn more? Go to http://beny-emely.info/. Enter L315 in the search box to learn more about \"Influenza (Flu): Care Instructions. \"  Current as of: December 6, 2017  Content Version: 11.8  © 2374-2660 [employer]. Care instructions adapted under license by [employer] (which disclaims liability or warranty for this information). If you have questions about a medical condition or this instruction, always ask your healthcare professional. [place] [address] any warranty or liability for your use of this information.

## 2018-12-17 NOTE — PROGRESS NOTES
Wyatt Sloan is a 66 y.o. male (: 1960) for sick visit:     Chief Complaint   Patient presents with    Cough    Cold Symptoms       Vitals:    18 1116   BP: 120/68   Pulse: 60   Resp: 18   Temp: 97.6 °F (36.4 °C)   TempSrc: Oral   SpO2: 95%   Weight: 241 lb 12.8 oz (109.7 kg)   Height: 5' 10\" (1.778 m)   PainSc:   1   PainLoc: Generalized         Coordination of Care Questionaire:   1. Have you been to the ER, urgent care clinic since your last visit? Hospitalized since your last visit? no    2. Have you seen or consulted any other health care providers outside of the [facility] since your last visit? Include any pap smears or colon screening. no    Health Maintenance      Health Maintenance Due   Topic Date Due    Shingrix Vaccine Age 49> (1 of 2) 2012    Influenza Age 5 to Adult  2018     Already got flu shot. hasnt gotten shingrix.        Per verbal from Dr. Nicol Wyatt, run POC flu test

## 2018-12-26 ENCOUNTER — OFFICE VISIT (OUTPATIENT)
Dept: FAMILY MEDICINE CLINIC | Age: 58
End: 2018-12-26

## 2018-12-26 VITALS
DIASTOLIC BLOOD PRESSURE: 85 MMHG | SYSTOLIC BLOOD PRESSURE: 126 MMHG | WEIGHT: 238 LBS | TEMPERATURE: 98.8 F | BODY MASS INDEX: 35.25 KG/M2 | HEART RATE: 99 BPM | HEIGHT: 69 IN | RESPIRATION RATE: 15 BRPM | OXYGEN SATURATION: 92 %

## 2018-12-26 DIAGNOSIS — J40 BRONCHITIS: ICD-10-CM

## 2018-12-26 DIAGNOSIS — R05.9 COUGH: ICD-10-CM

## 2018-12-26 DIAGNOSIS — G93.31 POST-INFLUENZA SYNDROME: Primary | ICD-10-CM

## 2018-12-26 RX ORDER — AZITHROMYCIN 250 MG/1
TABLET, FILM COATED ORAL
Qty: 6 TAB | Refills: 0 | Status: SHIPPED | OUTPATIENT
Start: 2018-12-26 | End: 2018-12-31

## 2018-12-26 RX ORDER — ALBUTEROL SULFATE 90 UG/1
1-2 AEROSOL, METERED RESPIRATORY (INHALATION)
Qty: 1 INHALER | Refills: 1 | Status: SHIPPED | OUTPATIENT
Start: 2018-12-26 | End: 2019-08-22

## 2018-12-26 NOTE — PROGRESS NOTES
Yvetta Fabry is a 66 y.o. male (: 1960) presenting to address:    Chief Complaint   Patient presents with    Flu     x 14 days       Vitals:    18 1256   Resp: 15   Weight: 238 lb (108 kg)   Height: 5' 9\" (1.753 m)   PainSc:   0 - No pain       Hearing/Vision:   No exam data present    Learning Assessment:     Learning Assessment 2014   PRIMARY LEARNER Patient   HIGHEST LEVEL OF EDUCATION - PRIMARY LEARNER  -   BARRIERS PRIMARY LEARNER -   PRIMARY LANGUAGE ENGLISH   LEARNER PREFERENCE PRIMARY READING   ANSWERED BY patient   RELATIONSHIP SELF     Depression Screening:     PHQ over the last two weeks 2018   PHQ Not Done Medical Reason (indicate in comments)   Little interest or pleasure in doing things Not at all   Feeling down, depressed, irritable, or hopeless Not at all   Total Score PHQ 2 0     Fall Risk Assessment:   No flowsheet data found. Abuse Screening:     Abuse Screening Questionnaire 3/2/2018   Do you ever feel afraid of your partner? N   Are you in a relationship with someone who physically or mentally threatens you? N   Is it safe for you to go home? Y     Coordination of Care Questionaire:   1. Have you been to the ER, urgent care clinic since your last visit? Hospitalized since your last visit? NO    2. Have you seen or consulted any other health care providers outside of the [address] [place] [address] since your last visit? Include any pap smears or colon screening. NO    Advanced Directive:   1. Do you have an Advanced Directive? NO    2. Would you like information on Advanced Directives?  YES

## 2018-12-26 NOTE — PROGRESS NOTES
[facility]  Primary Care Office Visit - Problem-Oriented    : 1960   Ana Alves is a 66 y.o. male presenting for  Chief Complaint   Patient presents with    Flu     x 14 days       Assessment/Plan:     1. Cough  2. Bronchitis  Initial encounter, however is 2 weeks s/p flu  3. Post-influenza syndrome  Will empirically treat for CAP with zpak. Also given albuterol for RAD/bronchitis picture. - azithromycin (ZITHROMAX) 250 mg tablet; Take 2 tablets today, then take 1 tablet daily  Dispense: 6 Tab; Refill: 0  - albuterol (PROVENTIL HFA, VENTOLIN HFA, PROAIR HFA) 90 mcg/actuation inhaler; Take 1-2 Puffs by inhalation every four (4) hours as needed for Wheezing. Dispense: 1 Inhaler; Refill: 1  - XR CHEST PA LAT; Future      Orders & Diagnoses associated with This Encounter:         ICD-10-CM ICD-9-CM   1. Post-influenza syndrome G93.3 780.79   2. Cough R05 786.2   3. Bronchitis J40 490        Orders Placed This Encounter    XR CHEST PA LAT     Standing Status:   Future     Number of Occurrences:   1     Standing Expiration Date:   2019     Scheduling Instructions:      bsma     Order Specific Question:   Reason for Exam     Answer:   cough s/p influenza     Order Specific Question:   Is Patient Allergic to Contrast Dye? Answer:   Unknown         This document may have been created with the aid of dictation software. Text may contain errors, particularly phonetic errors. Reviewed management plan & instructions with patient, who voiced understanding. Salome Barrera MD  Internal Medicine, Family Medicine & Sports Medicine  2018, 9:19 AM    Patient Instructions (provided in AVS): To Do:  · Start your antibiotics ASAP, continue until all pills are gone. · Use your albuterol inhaler as needed, for the cough and the wheezing.     Bronchitis: Care Instructions    History:   Ana Alves is a 66 y.o. male presenting to address:  Chief Complaint   Patient presents with    Flu     x 14 days       Took the tamiflu x 5 days, just a bit better towards the end of the medicine, and then symptoms returned of body aches, worsened cough, now again productive. No sore throat. Notes even a little bit of red in the phlegm he coughs up in the morning, but clears by the afternoon. Is significantly fatigued by the afternoon. Past Medical History:   Diagnosis Date    Bulging lumbar disc June 2014    L5/S1 (seee MRI June 2014); Small non-impinging left posterior disc protrusion L5/S1,    GERD (gastroesophageal reflux disease)     Uses PRN OTC Prilosec, doing well with low acid foods    Low back pain 5/29/2014    Prediabetes     HbA1C 5.9% 12/15    Right-sided low back pain with right-sided sciatica 07/01/2015    Anticipating surgery 5/30/17 stenosis S1, DDD  L4-S1 with fusion of L5 & S1    Sleep apnea     Has CPAP    Spinal stenosis June 2014    Lumbar spine MRI with mild degenerative spondylosis without high-grade stenosis.  Vitamin D deficiency 2/17/2018 2/14/18:  Vit D 15 --> start OTC Vit D3 5000 units     Past Surgical History:   Procedure Laterality Date    HX ACL RECONSTRUCTION  2004    HX LUMBAR LAMINECTOMY  05/30/2017    [facility] [place] [facility] [place]       reports that  has never smoked. he has never used smokeless tobacco. He reports that he drinks alcohol. He reports that he does not use drugs.   Social History     Social History Narrative    Not on file     Social History     Tobacco Use   Smoking Status Never Smoker   Smokeless Tobacco Never Used   Tobacco Comment    \"light smoking at 20 to look cool at the bars to  women\"     Family History   Problem Relation Age of Onset    Diabetes Maternal Grandfather      Allergies   Allergen Reactions    Lyrica [Pregabalin] Other (comments)     Weight gain       Problem List:      Patient Active Problem List    Diagnosis    Vitamin D deficiency     2/14/18:  Vit D 15 --> start OTC Vit D3 5000 units      BMI 35.0-35.9,adult     2/9/2018: Body mass index is 35.3 kg/(m^2). Recent weight gain due to med Rx'd by NSGY for chronic neuropathic pain ? Lyrica      Right-sided low back pain with right-sided sciatica     Managed by Dr. Ricki Condon ([address] [place] [address]), did not improve with [place] [facility]17 [facility] [place]      Spinal stenosis     Managed by Dr. Ricki Condon at [facility] ([address] [place] [address])      Prediabetes     2/14/18:  HbA1C 6%      Sleep apnea     Has CPAP      GERD (gastroesophageal reflux disease)     Uses PRN OTC Prilosec, doing well with low acid foods         Medications:     Current Outpatient Medications   Medication Sig    cholecalciferol (VITAMIN D3) 5,000 unit capsule Take 1 Cap by mouth daily.  cpap machine kit by Does Not Apply route. G47.33 - Update CPAP set @ Auto, 10-15cm H20, heated humidifier, tubing, climate line, filters (6), chinstrap, integrated heated humidifier. Authorized for 2nd mask and headgear within one year if needed     No current facility-administered medications for this visit. Review of Systems:     Review of Systems   Constitutional: Positive for malaise/fatigue. HENT: Positive for ear pain. Negative for sore throat. Respiratory: Positive for cough, hemoptysis, sputum production, shortness of breath and wheezing. Cardiovascular: Negative for chest pain. Physical Assessment:   VS:    Vitals:    12/26/18 1256   BP: 126/85   Pulse: 99   Resp: 15   Temp: 98.8 °F (37.1 °C)   TempSrc: Oral   SpO2: 92%   Weight: 238 lb (108 kg)   Height: 5' 9\" (1.753 m)   PF: 500 L/min   PainSc:   0 - No pain       Physical Exam   Constitutional: He is oriented to person, place, and time. He appears well-developed and well-nourished. No distress. HENT:   Head: Normocephalic and atraumatic.    Right Ear: Tympanic membrane, external ear and ear canal normal.   Left Ear: Tympanic membrane and external ear normal.   Mouth/Throat: Oropharynx is clear and moist.   Eyes: EOM are normal. Pupils are equal, round, and reactive to light. Neck: Normal range of motion. Neck supple. Cardiovascular: Normal rate, regular rhythm and normal heart sounds. No murmur heard. Pulmonary/Chest: Effort normal. No respiratory distress. He has no decreased breath sounds. He has wheezes (intermittent E wheeze). He has rhonchi (coarse rhonchi throughout). Musculoskeletal: Normal range of motion. Neurological: He is alert and oriented to person, place, and time. No cranial nerve deficit. Skin: Skin is warm and dry. He is not diaphoretic. Psychiatric: He has a normal mood and affect. His behavior is normal. Judgment and thought content normal.   Nursing note reviewed. Recent Labs & Imaging:     XR Results (most recent):  Results from Appointment encounter on 12/26/18   XR CHEST PA LAT    Narrative EXAM: Two-view chest    CLINICAL HISTORY: Cough    COMPARISON: None    FINDINGS:    Frontal and lateral views of the chest demonstrate clear lungs. Cardiac  silhouette is normal in size and contour. No acute bony or soft tissue  abnormality. Impression IMPRESSION:    No acute pulmonary process identified.

## 2018-12-26 NOTE — PATIENT INSTRUCTIONS
To Do: · Start your antibiotics ASAP, continue until all pills are gone. · Use your albuterol inhaler as needed, for the cough and the wheezing. Bronchitis: Care Instructions  Your Care Instructions    Bronchitis is inflammation of the bronchial tubes, which carry air to the lungs. The tubes swell and produce mucus, or phlegm. The mucus and inflamed bronchial tubes make you cough. You may have trouble breathing. Most cases of bronchitis are caused by viruses like those that cause colds. Antibiotics usually do not help and they may be harmful. Bronchitis usually develops rapidly and lasts about 2 to 3 weeks in otherwise healthy people. Follow-up care is a key part of your treatment and safety. Be sure to make and go to all appointments, and call your doctor if you are having problems. It's also a good idea to know your test results and keep a list of the medicines you take. How can you care for yourself at home? · Take all medicines exactly as prescribed. Call your doctor if you think you are having a problem with your medicine. · Get some extra rest.  · Take an over-the-counter pain medicine, such as acetaminophen (Tylenol), ibuprofen (Advil, Motrin), or naproxen (Aleve) to reduce fever and relieve body aches. Read and follow all instructions on the label. · Do not take two or more pain medicines at the same time unless the doctor told you to. Many pain medicines have acetaminophen, which is Tylenol. Too much acetaminophen (Tylenol) can be harmful. · Take an over-the-counter cough medicine that contains dextromethorphan to help quiet a dry, hacking cough so that you can sleep. Avoid cough medicines that have more than one active ingredient. Read and follow all instructions on the label. · Breathe moist air from a humidifier, hot shower, or sink filled with hot water. The heat and moisture will thin mucus so you can cough it out. · Do not smoke. Smoking can make bronchitis worse.  If you need help quitting, talk to your doctor about stop-smoking programs and medicines. These can increase your chances of quitting for good. When should you call for help? Call 911 anytime you think you may need emergency care. For example, call if:    · You have severe trouble breathing.    Call your doctor now or seek immediate medical care if:    · You have new or worse trouble breathing.     · You cough up dark brown or bloody mucus (sputum).     · You have a new or higher fever.     · You have a new rash.    Watch closely for changes in your health, and be sure to contact your doctor if:    · You cough more deeply or more often, especially if you notice more mucus or a change in the color of your mucus.     · You are not getting better as expected. Where can you learn more? Go to http://beny-emely.info/. Enter H333 in the search box to learn more about \"Bronchitis: Care Instructions. \"  Current as of: December 6, 2017  Content Version: 11.8  © 3842-7733 [employer]wise, Incorporated. Care instructions adapted under license by [employer] (which disclaims liability or warranty for this information). If you have questions about a medical condition or this instruction, always ask your healthcare professional. [place] [address] any warranty or liability for your use of this information.

## 2019-01-02 ENCOUNTER — TELEPHONE (OUTPATIENT)
Dept: FAMILY MEDICINE CLINIC | Age: 59
End: 2019-01-02

## 2019-01-02 NOTE — TELEPHONE ENCOUNTER
Pt called for his xray results that he got done last week. I informed him that his results were in but the provider still had to review the results and both Dr Lian Samuel his PCP and Dr Cris Pablo the [address] that order the xray were out of the office today and that he will most likely get a call tomorrow. Pt does not want to wait until tomorrow and is asking if a covering provider can review the results.  Please advise and give pt a call back either way

## 2019-01-15 ENCOUNTER — OFFICE VISIT (OUTPATIENT)
Dept: FAMILY MEDICINE CLINIC | Age: 59
End: 2019-01-15

## 2019-01-15 VITALS
BODY MASS INDEX: 35.1 KG/M2 | OXYGEN SATURATION: 94 % | SYSTOLIC BLOOD PRESSURE: 125 MMHG | WEIGHT: 237 LBS | TEMPERATURE: 96 F | HEART RATE: 57 BPM | HEIGHT: 69 IN | RESPIRATION RATE: 18 BRPM | DIASTOLIC BLOOD PRESSURE: 74 MMHG

## 2019-01-15 DIAGNOSIS — K21.9 GASTROESOPHAGEAL REFLUX DISEASE, ESOPHAGITIS PRESENCE NOT SPECIFIED: Chronic | ICD-10-CM

## 2019-01-15 DIAGNOSIS — R13.10 DYSPHAGIA, UNSPECIFIED TYPE: Primary | ICD-10-CM

## 2019-01-15 PROBLEM — E66.01 SEVERE OBESITY (HCC): Status: ACTIVE | Noted: 2019-01-15

## 2019-01-15 RX ORDER — OMEPRAZOLE 20 MG/1
CAPSULE, DELAYED RELEASE ORAL
Qty: 90 CAP | Refills: 3 | Status: SHIPPED | OUTPATIENT
Start: 2019-01-15 | End: 2021-12-13 | Stop reason: SDUPTHER

## 2019-01-15 NOTE — PROGRESS NOTES
PRE-VISIT PLANNING:    
PATIENT NAME:  Kelly Villalta :  1960 PCP:  Sharri Habermann, MD  
 
Future Appointments Date Time Provider Joey Españai 1/15/2019  8:00 AM Sharri Habermann,  [address]. [place] Kelly Villalta is scheduled for an office visit with Koby Lozano MD on 01/15/2019 for evaluation of cough. Encounter opened prior to visit to complete pre-visit planning. Last office visit with PCP was 2018 for influenza A. Pending issues: 
-- Seen by Dr. Meliton Christine after flu Health Maintenance Due Topic Date Due  Shingrix Vaccine Age 50> (1 of 2) 2012  Influenza Age 5 to Adult  2018 Rooming Nurse: Internal Medicine Acute Care Visit [address] [facility] at [address] [facility], [address] Phone [phone]; Fax [phone] Date of Service:  01/15/2019 Patient's Name & :   Kelly Villalta - 1960 Patient's PCP:  Sharri Habermann, MD  
 
IMPRESSION/PLAN:  
 
Kelly Villalta is a very pleasant 66 y.o. male who was seen today for evaluation of chronic coughing/choking. The primary encounter diagnosis was Dysphagia, unspecified type. A diagnosis of Gastroesophageal reflux disease, esophagitis presence not specified was also pertinent to this visit. Mechanical dysphagia with longstanding GERD hx, on PPI Cont PPI Eval with GI, likely needs EGD given neg barium swallow at [facility] per pt report Body mass index is 35 kg/m². Discussed achieving/maintaining healthy weight and BMI. Follow up BMI/weight at next visit. Orders Placed This Encounter  REFERRAL TO GASTROENTEROLOGY  omeprazole (PRILOSEC) 20 mg capsule Patient Instructions Continue PPI  
 
GI Call and request an earlier appointment if needed to address any questions or concerns . TESTING RESULTS Results will be released to [employer] or sent by [place] mail. If you have questions about your results, please schedule a follow up appointment to discuss with your PCP. MEDICATION REFILLS   
-- Please allow at least 2 business days for refill requests to be addressed. Medication refills may be requested through your pharmacy. Refills will not be provided by the after hours/on call provider. The patient states understanding of recommended treatment plan. Evonne Orta MD - Internal Medicine 01/15/2019, 8:16 AM 
 
Subjective/Discussion Chief Complaint Patient presents with  Cough  
  starts coughing when talking to much or when eating Joselito Wei is a 66 y.o. male complaints of dry cough. Starts when he eats or if he has been talking. Worse recently, starts with pressure in throat. Has had symptoms for years. No trouble with liquids, eggs are okay, dry foods are a problem. Foods that require more chewing might be a problem, meat and vegetables are a problem. Review of Systems Respiratory: Positive for cough. Negative for hemoptysis, sputum production, shortness of breath and wheezing. Gastrointestinal: Positive for heartburn. Negative for abdominal pain, blood in stool, constipation, diarrhea, melena, nausea and vomiting. +Difficulty swallowing solids, choking/coughing Objective:    
/74 (BP 1 Location: Right arm, BP Patient Position: Sitting)   Pulse (!) 57   Temp 96 °F (35.6 °C) (Oral)   Resp 18   Ht 5' 9\" (1.753 m)   Wt 237 lb (107.5 kg)   SpO2 94%   BMI 35.00 kg/m² Physical Exam  
Constitutional: He is oriented to person, place, and time. He appears well-developed and well-nourished. No distress. HENT:  
Head: Normocephalic and atraumatic. Eyes: Conjunctivae and EOM are normal. Right eye exhibits no discharge. Left eye exhibits no discharge. No scleral icterus. Neck: Neck supple.   
Pulmonary/Chest: Effort normal.  
 Neurological: He is alert and oriented to person, place, and time. He exhibits normal muscle tone. Coordination normal.  
Skin: Skin is warm and dry. No rash noted. He is not diaphoretic. No erythema. No pallor. Psychiatric: He has a normal mood and affect. His behavior is normal. Judgment and thought content normal.  
  
Additional History Patient's Past Med Hx, Surg Hx, Soc Hx, Family Hx reviewed. Current Outpatient Medications Medication Sig  
 omeprazole (PRILOSEC) 20 mg capsule TAKE 1 TABLET BY MOUTH DAILY.  albuterol (PROVENTIL HFA, VENTOLIN HFA, PROAIR HFA) 90 mcg/actuation inhaler Take 1-2 Puffs by inhalation every four (4) hours as needed for Wheezing.  cholecalciferol (VITAMIN D3) 5,000 unit capsule Take 1 Cap by mouth daily.  cpap machine kit by Does Not Apply route. G47.33 - Update CPAP set @ Auto, 10-15cm H20, heated humidifier, tubing, climate line, filters (6), chinstrap, integrated heated humidifier. Authorized for 2nd mask and headgear within one year if needed No current facility-administered medications for this visit. Allergies Allergen Reactions  Lyrica [Pregabalin] Other (comments) Weight gain Encounter Diagnoses:  
 
Encounter Diagnoses ICD-10-CM ICD-9-CM 1. Dysphagia, unspecified type R13.10 787.20  
2.  Gastroesophageal reflux disease, esophagitis presence not specified K21.9 530.81

## 2019-01-15 NOTE — PATIENT INSTRUCTIONS
Continue PPI  
 
GI Call and request an earlier appointment if needed to address any questions or concerns . TESTING RESULTS Results will be released to [employer] or sent by [address] mail. If you have questions about your results, please schedule a follow up appointment to discuss with your PCP. MEDICATION REFILLS   
-- Please allow at least 2 business days for refill requests to be addressed. Medication refills may be requested through your pharmacy. Refills will not be provided by the after hours/on call provider.

## 2019-01-15 NOTE — PROGRESS NOTES
Brett Munroe is a 66 y.o. male (: 1960) presenting to address: Chief Complaint Patient presents with  Cough  
  starts coughing when talking to much or when eating Vitals:  
 01/15/19 0754 BP: 125/74 Pulse: (!) 57 Resp: 18 Temp: 96 °F (35.6 °C) TempSrc: Oral  
SpO2: 94% Weight: 237 lb (107.5 kg) Height: 5' 9\" (1.753 m) PainSc:   0 - No pain Hearing/Vision: No exam data present Learning Assessment:  
 
Learning Assessment 2014 PRIMARY LEARNER Patient HIGHEST LEVEL OF EDUCATION - PRIMARY LEARNER  -  
BARRIERS PRIMARY LEARNER -  
PRIMARY LANGUAGE ENGLISH  
LEARNER PREFERENCE PRIMARY READING  
ANSWERED BY patient RELATIONSHIP SELF Depression Screening: PHQ over the last two weeks 1/15/2019 PHQ Not Done - Little interest or pleasure in doing things Not at all Feeling down, depressed, irritable, or hopeless Not at all Total Score PHQ 2 0 Fall Risk Assessment:  
No flowsheet data found. Abuse Screening:  
 
Abuse Screening Questionnaire 3/2/2018 Do you ever feel afraid of your partner? Seretha Juanpablo Are you in a relationship with someone who physically or mentally threatens you? Allen Geronimo Is it safe for you to go home? Alejo Guardado Coordination of Care Questionaire: 1. Have you been to the ER, urgent care clinic since your last visit? Hospitalized since your last visit? NO 
 
2. Have you seen or consulted any other health care providers outside of the [address] [place] [address] since your last visit? Include any pap smears or colon screening. NO Advanced Directive: 1. Do you have an Advanced Directive? NO 
 
2. Would you like information on Advanced Directives?  NO

## 2019-03-14 ENCOUNTER — HOSPITAL ENCOUNTER (OUTPATIENT)
Dept: GENERAL RADIOLOGY | Age: 59
Discharge: HOME | End: 2019-03-14
Attending: INTERNAL MEDICINE
Payer: OTHER GOVERNMENT

## 2019-03-14 DIAGNOSIS — R13.10 DYSPHAGIA: ICD-10-CM

## 2019-03-14 PROCEDURE — 74011000255 HC RX REV CODE- 255

## 2019-03-14 PROCEDURE — 74220 X-RAY XM ESOPHAGUS 1CNTRST: CPT

## 2019-03-14 PROCEDURE — 74011000250 HC RX REV CODE- 250

## 2019-03-14 RX ADMIN — BARIUM SULFATE 176 G: 960 POWDER, FOR SUSPENSION ORAL at 07:40

## 2019-03-14 RX ADMIN — ANTACID/ANTIFLATULENT 4 G: 380; 550; 10; 10 GRANULE, EFFERVESCENT ORAL at 07:41

## 2019-03-14 RX ADMIN — BARIUM SULFATE 135 ML: 980 POWDER, FOR SUSPENSION ORAL at 07:43

## 2019-06-05 ENCOUNTER — OFFICE VISIT (OUTPATIENT)
Dept: FAMILY MEDICINE CLINIC | Age: 59
End: 2019-06-05

## 2019-06-05 VITALS
RESPIRATION RATE: 14 BRPM | SYSTOLIC BLOOD PRESSURE: 123 MMHG | TEMPERATURE: 95.7 F | WEIGHT: 240 LBS | DIASTOLIC BLOOD PRESSURE: 73 MMHG | HEART RATE: 53 BPM | BODY MASS INDEX: 35.55 KG/M2 | OXYGEN SATURATION: 96 % | HEIGHT: 69 IN

## 2019-06-05 DIAGNOSIS — L91.8 SKIN TAG: ICD-10-CM

## 2019-06-05 DIAGNOSIS — D49.2 SKIN GROWTH: ICD-10-CM

## 2019-06-05 NOTE — PROGRESS NOTES
PRE-VISIT PLANNING:     PATIENT NAME:  Berkley Bella   :  1960   PCP:  Edis Paulson MD     Future Appointments   Date Time Provider Joey Hinton   2019 10:00 AM Edis Paulson MD [facility] is scheduled for an office visit with Monica Mayer MD on 2019 for skin lesions/concerns. Encounter opened prior to visit to complete pre-visit planning. Last office visit with PCP was 1/15/2019. Pending issues:  -- Last labs 18   -- EGD 19 with Dr. Gay Anglin - duodenitis, no Oneill's; phone encounters in [address] [place] [address] Loop indicate manometry is being considered for eval of dysphagia    Health Maintenance Due   Topic Date Due    Shingrix Vaccine Age 49> (1 of 2) 2012       Rooming Nurse:     -- Release for Shingrix vaccine administration reports (if done)  -- *may need liquid nitrogen cannister filled          Internal Medicine  Acute Care Visit  [facility] at [address] [facility], [address] [place] [address]  Phone [phone]; Fax [phone]    Date of Service:  2019  Patient's Name & :   Berkley Bella - 1960   Patient's PCP:  Edis Paulson MD     SUBJECTIVE        Chief Complaint   Patient presents with   [facility] Skin Problem       Berkley Bella is a very pleasant 66 y.o. male complaining of skin lesion on forehead and two on abdomen. Skin lesion on forehead is new, showed up within past few weeks, slightly pigmented, asymptomatic. Skin tags on abdomen present for a long time, stable, asymptomatic. Seeing GI. Modified barium swallow planned for evaluation of difficulty swallowing. Exposures: +sun exposure while mowing and golfing, does wear a wide brimmed hat mowing      ASSESSMENT & PLAN    Diagnoses of Skin tag and Skin growth were pertinent to this visit.      New skin growth on forehead, no worrisome features, most likely dx estrella k, patient to monitor for change/growth  Skin tags on abdomen, asymptomatic, no need for intervention  Discussed option of treatment/removal for cosmetic purposes, will hold off for now   No orders of the defined types were placed in this encounter. Patient states understanding of treatment plan. Lacy Casillas MD   06/05/2019 7:20 AM     OBJECTIVE    /73 (BP 1 Location: Right arm, BP Patient Position: Sitting)   Pulse (!) 53   Temp 95.7 °F (35.4 °C) (Oral)   Resp 14   Ht 5' 9\" (1.753 m)   Wt 240 lb (108.9 kg)   SpO2 96%   BMI 35.44 kg/m²     General:  Well-appearing, appropriately dressed and groomed, behavior and conversation appropriate for age, in no distress   Skin:  Skin tanned on face, neck, upper extremities. Single 0.5cm slightly raised light brown textured growth on right forehead, no ulcerations, crusting, or telangiectasias. Two soft, fleshy, pedunculated growths on right mid abdomen       Additional History   Patient's Past Med Hx, Surg Hx, Soc Hx, Family Hx reviewed. Current Outpatient Medications   Medication Sig    omeprazole (PRILOSEC) 20 mg capsule TAKE 1 TABLET BY MOUTH DAILY.  cholecalciferol (VITAMIN D3) 5,000 unit capsule Take 1 Cap by mouth daily.  cpap machine kit by Does Not Apply route. G47.33 - Update CPAP set @ Auto, 10-15cm H20, heated humidifier, tubing, climate line, filters (6), chinstrap, integrated heated humidifier. Authorized for 2nd mask and headgear within one year if needed    albuterol (PROVENTIL HFA, VENTOLIN HFA, PROAIR HFA) 90 mcg/actuation inhaler Take 1-2 Puffs by inhalation every four (4) hours as needed for Wheezing. No current facility-administered medications for this visit. Allergies   Allergen Reactions    Lyrica [Pregabalin] Other (comments)     Weight gain       Encounter Diagnoses:     Encounter Diagnoses     ICD-10-CM ICD-9-CM   1. Skin tag L91.8 701.9   2.  Skin growth D49.2 239.2

## 2019-06-05 NOTE — PROGRESS NOTES
Ace Chaves is a 66 y.o. male (: 1960) presenting to address:    Chief Complaint   Patient presents with    Skin Problem       Vitals:    19 1019   BP: 123/73   Pulse: (!) 53   Resp: 14   Temp: 95.7 °F (35.4 °C)   TempSrc: Oral   SpO2: 96%   Weight: 240 lb (108.9 kg)   Height: 5' 9\" (1.753 m)   PainSc:   2   PainLoc: Back       Hearing/Vision:   No exam data present    Learning Assessment:     Learning Assessment 2014   PRIMARY LEARNER Patient   HIGHEST LEVEL OF EDUCATION - PRIMARY LEARNER  -   BARRIERS PRIMARY LEARNER -   PRIMARY LANGUAGE ENGLISH   LEARNER PREFERENCE PRIMARY READING   ANSWERED BY patient   RELATIONSHIP SELF     Depression Screening:     3 most recent PHQ Screens 2019   PHQ Not Done -   Little interest or pleasure in doing things Not at all   Feeling down, depressed, irritable, or hopeless Not at all   Total Score PHQ 2 0     Fall Risk Assessment:     Fall Risk Assessment, last 12 mths 2019   Able to walk? Yes   Fall in past 12 months? No     Abuse Screening:     Abuse Screening Questionnaire 2019   Do you ever feel afraid of your partner? N   Are you in a relationship with someone who physically or mentally threatens you? N   Is it safe for you to go home? Y     Coordination of Care Questionaire:   1. Have you been to the ER, urgent care clinic since your last visit? Hospitalized since your last visit? NO    2. Have you seen or consulted any other health care providers outside of the [address] [place] [address] since your last visit? Include any pap smears or colon screening. YES Dr. Isaura Sutton    Advanced Directive:   1. Do you have an Advanced Directive? NO    2. Would you like information on Advanced Directives?  NO

## 2019-06-19 ENCOUNTER — HOSPITAL ENCOUNTER (OUTPATIENT)
Dept: GENERAL RADIOLOGY | Age: 59
Discharge: HOME | End: 2019-06-19
Attending: INTERNAL MEDICINE
Payer: OTHER GOVERNMENT

## 2019-06-19 DIAGNOSIS — R13.10 DYSPHAGIA: ICD-10-CM

## 2019-06-19 DIAGNOSIS — R13.10 DYSPHAGIA, UNSPECIFIED TYPE: ICD-10-CM

## 2019-06-19 PROCEDURE — 74230 X-RAY XM SWLNG FUNCJ C+: CPT

## 2019-06-19 PROCEDURE — 92611 MOTION FLUOROSCOPY/SWALLOW: CPT

## 2019-06-19 PROCEDURE — 74011000255 HC RX REV CODE- 255

## 2019-06-19 RX ADMIN — BARIUM SULFATE 700 MG: 700 TABLET ORAL at 13:00

## 2019-06-19 RX ADMIN — BARIUM SULFATE 45 ML: 400 PASTE ORAL at 13:00

## 2019-06-19 RX ADMIN — BARIUM SULFATE 75 G: 960 POWDER, FOR SUSPENSION ORAL at 13:00

## 2019-06-19 NOTE — PROGRESS NOTES
Outpatient Modified Barium Swallow Evaluation    Patient: Natasha Iyer (56 y.o. male)  Date: 6/19/2019  Primary Diagnosis: Dysphagia [R13.10]  Precautions: Aspiration       Recommend  Regular diet with thin liquids  No straws  Aspiration precautions  Meds one at a time  Upright for all intake and for at least 30 min after meals  Follow up with GI  Outpatient speech therapy to address dysphagia     Videofluoroscopy Results  Pt referred for MBS secondary to report of coughing/choking during meals. Pt also reports globus sensation after intake localized to the level of the sternum. Pt demo laryngeal penetration on thin liquid with immediate strong cough that cleared potential aspirate. Pt tolerating thin liquids by cup, pudding, regular solid and 13 mm Ba pill with thin liquid wash with positive airway protection noted across multiple trials. Deficits include decreased base of tongue strength with premature spillage into the valleculae prior to swallow initiation and mild vallecular residuals post swallow. Pt also demo decreased laryngeal elevation/adduction, slowed epiglottic inversion and overall decreased pharyngeal motility/decreased upper esophageal sphincter opening resulting in mild pyriform sinus residuals. Of note, pt coughing throughout examination in absence of airway compromise. Recommend regular diet with thin liquids with no straws with use of the above mentioned compensatory strategies. Pt would benefit from outpatient SLP to address dysphagia and GI follow up (reported follow up appointment scheduled later this week). Results/recommendations with video feedback discussed with pt who was able to verbalize understanding. Video Flouroscopic Procedures  [x] Lateral View   [] A-P View [] Scanned to level of Sternum    [x] Seated at 90 deg.   [] Other:    Presentation:    [x] Spoon   [x] Cup   [x] Straw   [] Syringe   [x] Consecutive Swallows  [] Other:     Consistencies:   [x] Ba+ liquid    [] Ba+ liquid (nectar)    [] Ba+ liquid (honey)    [x] Ba+ pudding  [x] Ba+ cookie  [x] 13 mm Ba pill with thin Ba wash    Treatment Techniques Attempted:   [] Head Turn: [] Right [] Left     [] Head Tilt: [] Right [] Left     [] Chin Down:  [] Small Sips/bites:  [] Effortful swallow:  [x] Double swallow: Effective at clearing pharyngeal residue   [] Other:    Results  Dysphagia Present:     [x] Yes  [] No    Ratings of Dysphagia:     [x] Mild  [] Moderate  [] Severe    Stages of Breakdown:   [x] Oral  [x] Pharyngeal  [x] Esophageal (suspected)     Aspiration:   [] Yes    [x] No  [x] At Risk     Cough: [] Yes      [] No     Penetration:   [x] Yes    [] No     Cough: [x] Yes      [] No   [] Flash/trace   [] Mod   [] To Chords          Consistency Aspirated:   Consistency Penetrated:   [] Thin Liquid     [x] Thin Liquid + straw  [] Nectar-thick Liquid    [] Nectar-thick Liquid   [] Honey-thick Liquid    [] Honey-thick Liquid   [] Puree     [] Puree  [] Solid     [] Solid  [] 13 mm Ba pill     [] 13 mm Ba pill      Motility Problems with:  [] Lip Closure:    [] Mastication:   [] Bolus Formation/control:   [] A-P Transport:  [x] Tongue Base Retraction:  [] Swallow Response (delayed):  [] Velopharyngeal Closure:  [] Pharyngeal Aspirations:  [x] Laryngeal Elevation/adduction:  [x] Epiglottic Inversion:  [x] Pharyngeal motility  [x] Cricopharyngeal Relaxation:  [] Esophageal Peristalsis:  [] Other:    Timing of Aspiration/Penetration:  [] Before Swallow:  [x] During Swallow:  [] After Swallow:    Transit Time Delay:  [] >1 Second  Oral  [x] >1 Second Pharyngeal  [] >20 Second Esophageal     Residuals:  [x] Vallecula:    [x] Mild  [] Mod  [] Severe  [x] Pyriform Sinus:   [x] Mild  [] Mod  [] Severe  [x] Posterior Pharyngeal Wall:  [x] Mild  [] Mod  [] Severe    Thank you for this referral,   NATALI ParadaS., [address] [facility]  Speech-Language Pathologist

## 2019-08-22 ENCOUNTER — OFFICE VISIT (OUTPATIENT)
Dept: FAMILY MEDICINE CLINIC | Age: 59
End: 2019-08-22

## 2019-08-22 VITALS
HEIGHT: 69 IN | DIASTOLIC BLOOD PRESSURE: 74 MMHG | HEART RATE: 56 BPM | TEMPERATURE: 97.5 F | BODY MASS INDEX: 35.52 KG/M2 | WEIGHT: 239.8 LBS | RESPIRATION RATE: 14 BRPM | OXYGEN SATURATION: 97 % | SYSTOLIC BLOOD PRESSURE: 122 MMHG

## 2019-08-22 DIAGNOSIS — E66.01 SEVERE OBESITY (HCC): ICD-10-CM

## 2019-08-22 DIAGNOSIS — M79.671 RIGHT FOOT PAIN: ICD-10-CM

## 2019-08-22 DIAGNOSIS — S66.912A HAND STRAIN, LEFT, INITIAL ENCOUNTER: ICD-10-CM

## 2019-08-22 DIAGNOSIS — Z00.00 ROUTINE GENERAL MEDICAL EXAMINATION AT A HEALTH CARE FACILITY: Primary | ICD-10-CM

## 2019-08-22 DIAGNOSIS — E55.9 VITAMIN D DEFICIENCY: ICD-10-CM

## 2019-08-22 NOTE — PROGRESS NOTES
Konstantin Patient is a 64 y.o. male (: 1960) presenting to address:    Chief Complaint   Patient presents with    Hand Injury     left hand injury; patient feel last month on vacation; patient declined flu vaccine    Foot Pain     right foot pain; patient states it feels like a marble on bottom of foot       Vitals:    19 0722   BP: 122/74   Pulse: (!) 56   Resp: 14   Temp: 97.5 °F (36.4 °C)   TempSrc: Oral   SpO2: 97%   Weight: 239 lb 12.8 oz (108.8 kg)   Height: 5' 9\" (1.753 m)   PainSc:   3   PainLoc: Hand       Hearing/Vision:   No exam data present    Learning Assessment:     Learning Assessment 2014   PRIMARY LEARNER Patient   HIGHEST LEVEL OF EDUCATION - PRIMARY LEARNER  -   BARRIERS PRIMARY LEARNER -   PRIMARY LANGUAGE ENGLISH   LEARNER PREFERENCE PRIMARY READING   ANSWERED BY patient   RELATIONSHIP SELF     Depression Screening:     3 most recent PHQ Screens 2019   PHQ Not Done -   Little interest or pleasure in doing things Not at all   Feeling down, depressed, irritable, or hopeless Not at all   Total Score PHQ 2 0     Fall Risk Assessment:     Fall Risk Assessment, last 12 mths 2019   Able to walk? Yes   Fall in past 12 months? No     Abuse Screening:     Abuse Screening Questionnaire 2019   Do you ever feel afraid of your partner? N   Are you in a relationship with someone who physically or mentally threatens you? N   Is it safe for you to go home? Y     Coordination of Care Questionaire:   1. Have you been to the ER, urgent care clinic since your last visit? Hospitalized since your last visit? NO    2. Have you seen or consulted any other health care providers outside of the [address] [place] [address] since your last visit? Include any pap smears or colon screening. NO    Advanced Directive:   1. Do you have an Advanced Directive? NO    2. Would you like information on Advanced Directives? NO    Patient DECLINED flu vaccine.

## 2019-08-22 NOTE — PATIENT INSTRUCTIONS
Lab today further disposition pending lab results  With regard to the left hand pain: Apply warm wet compresses frequently, avoid painful activity, take OTC analgesics such as ibuprofen or acetaminophen as needed. Podiatry referral  Avoid dietary starch and sugar and follow a program of regular aerobic exercise  Anticipatory guidance and recommendations provided verbally and with printed information. Return for annual physical in 1 year, sooner with any problems. Well Visit, Men 48 to 72: Care Instructions  Your Care Instructions    Physical exams can help you stay healthy. Your doctor has checked your overall health and may have suggested ways to take good care of yourself. He or she also may have recommended tests. At home, you can help prevent illness with healthy eating, regular exercise, and other steps. Follow-up care is a key part of your treatment and safety. Be sure to make and go to all appointments, and call your doctor if you are having problems. It's also a good idea to know your test results and keep a list of the medicines you take. How can you care for yourself at home? · Reach and stay at a healthy weight. This will lower your risk for many problems, such as obesity, diabetes, heart disease, and high blood pressure. · Get at least 30 minutes of exercise on most days of the week. Walking is a good choice. You also may want to do other activities, such as running, swimming, cycling, or playing tennis or team sports. · Do not smoke. Smoking can make health problems worse. If you need help quitting, talk to your doctor about stop-smoking programs and medicines. These can increase your chances of quitting for good. · Protect your skin from too much sun. When you're outdoors from 10 a.m. to 4 p.m., stay in the shade or cover up with clothing and a hat with a wide brim. Wear sunglasses that block UV rays.  Even when it's cloudy, put broad-spectrum sunscreen (SPF 30 or higher) on any exposed skin.  · See a dentist one or two times a year for checkups and to have your teeth cleaned. · Wear a seat belt in the car. Follow your doctor's advice about when to have certain tests. These tests can spot problems early. · Cholesterol. Your doctor will tell you how often to have this done based on your overall health and other things that can increase your risk for heart attack and stroke. · Blood pressure. Have your blood pressure checked during a routine doctor visit. Your doctor will tell you how often to check your blood pressure based on your age, your blood pressure results, and other factors. · Prostate exam. Talk to your doctor about whether you should have a blood test (called a PSA test) for prostate cancer. Experts recommend that you discuss the benefits and risks of the test with your doctor before you decide whether to have this test.  · Diabetes. Ask your doctor whether you should have tests for diabetes. · Vision. Some experts recommend that you have yearly exams for glaucoma and other age-related eye problems starting at age 48. · Hearing. Tell your doctor if you notice any change in your hearing. You can have tests to find out how well you hear. · Colorectal cancer. Your risk for colorectal cancer gets higher as you get older. Some experts say that adults should start regular screening at age 48 and stop at age 76. Others say to start before age 48 or continue after age 76. Talk with your doctor about your risk and when to start and stop screening. · Heart attack and stroke risk. At least every 4 to 6 years, you should have your risk for heart attack and stroke assessed. Your doctor uses factors such as your age, blood pressure, cholesterol, and whether you smoke or have diabetes to show what your risk for a heart attack or stroke is over the next 10 years. · Abdominal aortic aneurysm. Ask your doctor whether you should have a test to check for an aneurysm.  You may need a test if you ever smoked or if your parent, brother, sister, or child has had an aneurysm. When should you call for help? Watch closely for changes in your health, and be sure to contact your doctor if you have any problems or symptoms that concern you. Where can you learn more? Go to http://beny-emely.info/. Enter Y174 in the search box to learn more about \"Well Visit, Men 48 to 72: Care Instructions. \"  Current as of: December 13, 2018  Content Version: 12.1  © 6033-1172 [employer]. Care instructions adapted under license by [employer] (which disclaims liability or warranty for this information). If you have questions about a medical condition or this instruction, always ask your healthcare professional. [address] any warranty or liability for your use of this information. Starting a Weight Loss Plan: Care Instructions  Your Care Instructions    If you are thinking about losing weight, it can be hard to know where to start. Your doctor can help you set up a weight loss plan that best meets your needs. You may want to take a class on nutrition or exercise, or join a weight loss support group. If you have questions about how to make changes to your eating or exercise habits, ask your doctor about seeing a registered dietitian or an exercise specialist.  It can be a big challenge to lose weight. But you do not have to make huge changes at once. Make small changes, and stick with them. When those changes become habit, add a few more changes. If you do not think you are ready to make changes right now, try to pick a date in the future. Make an appointment to see your doctor to discuss whether the time is right for you to start a plan. Follow-up care is a key part of your treatment and safety. Be sure to make and go to all appointments, and call your doctor if you are having problems.  It's also a good idea to know your test results and keep a list of the medicines you take. How can you care for yourself at home? · Set realistic goals. Many people expect to lose much more weight than is likely. A weight loss of 5% to 10% of your body weight may be enough to improve your health. · Get family and friends involved to provide support. Talk to them about why you are trying to lose weight, and ask them to help. They can help by participating in exercise and having meals with you, even if they may be eating something different. · Find what works best for you. If you do not have time or do not like to cook, a program that offers meal replacement bars or shakes may be better for you. Or if you like to prepare meals, finding a plan that includes daily menus and recipes may be best.  · Ask your doctor about other health professionals who can help you achieve your weight loss goals. ? A dietitian can help you make healthy changes in your diet. ? An exercise specialist or  can help you develop a safe and effective exercise program.  ? A counselor or psychiatrist can help you cope with issues such as depression, anxiety, or family problems that can make it hard to focus on weight loss. · Consider joining a support group for people who are trying to lose weight. Your doctor can suggest groups in your area. Where can you learn more? Go to http://beny-emely.info/. Enter L093 in the search box to learn more about \"Starting a Weight Loss Plan: Care Instructions. \"  Current as of: March 28, 2019  Content Version: 12.1  © 8120-7711 [employer]. Care instructions adapted under license by [employer] (which disclaims liability or warranty for this information). If you have questions about a medical condition or this instruction, always ask your healthcare professional. [address] any warranty or liability for your use of this information.          Learning About Low-Carbohydrate Diets for Weight Loss  What is a low-carbohydrate diet? Low-carb diets avoid foods that are high in carbohydrate. These high-carb foods include pasta, bread, rice, cereal, fruits, and starchy vegetables. Instead, these diets usually have you eat foods that are high in fat and protein. Many people lose weight quickly on a low-carb diet. But the early weight loss is water. People on this diet often gain the weight back after they start eating carbs again. Not all diet plans are safe or work well. A lot of the evidence shows that low-carb diets aren't healthy. That's because these diets often don't include healthy foods like fruits and vegetables. Losing weight safely means balancing protein, fat, and carbs with every meal and snack. And low-carb diets don't always provide the vitamins, minerals, and fiber you need. If you have a serious medical condition, talk to your doctor before you try any diet. These conditions include kidney disease, heart disease, type 2 diabetes, high cholesterol, and high blood pressure. If you are pregnant, it may not be safe for your baby if you are on a low-carb diet. How can you lose weight safely? You might have heard that a diet plan helped another person lose weight. But that doesn't mean that it will work for you. It is very hard to stay on a diet that includes lots of big changes in your eating habits. If you want to get to a healthy weight and stay there, making healthy lifestyle changes will often work better than dieting. These steps can help. · Make a plan for change. Work with your doctor to create a plan that is right for you. · See a dietitian. He or she can show you how to make healthy changes in your eating habits. · Manage stress. If you have a lot of stress in your life, it can be hard to focus on making healthy changes to your daily habits. · Track your food and activity.  You are likely to do better at losing weight if you keep track of what you eat and what you do.  Follow-up care is a key part of your treatment and safety. Be sure to make and go to all appointments, and call your doctor if you are having problems. It's also a good idea to know your test results and keep a list of the medicines you take. Where can you learn more? Go to http://beny-emely.info/. Enter A121 in the search box to learn more about \"Learning About Low-Carbohydrate Diets for Weight Loss. \"  Current as of: November 7, 2018  Content Version: 12.1  © 7018-3124 Healthwise, Incorporated. Care instructions adapted under license by [employer] (which disclaims liability or warranty for this information). If you have questions about a medical condition or this instruction, always ask your healthcare professional. [address] any warranty or liability for your use of this information.

## 2019-08-22 NOTE — PROGRESS NOTES
HISTORY OF PRESENT ILLNESS  Shahzad Redd is a 64 y.o. male. Presents to establish care following the departure of his previous PCP from the practice. Also reports a hand injury and foot pain. Establish Care   The history is provided by the patient and medical records. Pertinent negatives include no chest pain, no abdominal pain, no headaches and no shortness of breath. Hand Injury    The history is provided by the patient and medical records. This is a new problem. Associated symptoms include tingling (tingling right leg and foot when driving, Hx back surgery). Pertinent negatives include no itching. Foot Pain   The history is provided by the patient and medical records. Pertinent negatives include no chest pain, no abdominal pain, no headaches and no shortness of breath. Mr#: [id number]      Past Medical History:   Diagnosis Date    Bulging lumbar disc June 2014    L5/S1 (seee MRI June 2014); Small non-impinging left posterior disc protrusion L5/S1,    Duodenitis 05/09/2019    Noted on EGD 5/9/19 - Dr. Stephanie Curry GERD (gastroesophageal reflux disease)     Uses PRN OTC Prilosec, doing well with low acid foods    Low back pain 5/29/2014    Prediabetes     HbA1C 5.9% 12/15    Right-sided low back pain with right-sided sciatica 07/01/2015    Anticipating surgery 5/30/17 stenosis S1, DDD  L4-S1 with fusion of L5 & S1    Sleep apnea     Has CPAP    Spinal stenosis June 2014    Lumbar spine MRI with mild degenerative spondylosis without high-grade stenosis.     Vitamin D deficiency 2/17/2018 2/14/18:  Vit D 15 --> start OTC Vit D3 5000 units       Past Surgical History:   Procedure Laterality Date    HX ACL RECONSTRUCTION  2004    HX ENDOSCOPY N/A 05/09/2019    Dr. Jose Antonio Salmeron, al dysphagia, [place], report in [address]  05/30/2017    [facility] - Dr. Kathleen Inman        Family History   Problem Relation Age of Onset    Diabetes Maternal Grandfather        Allergies Allergen Reactions    Lyrica [Pregabalin] Other (comments)     Weight gain       Social History     Tobacco Use   Smoking Status Never Smoker   Smokeless Tobacco Never Used   Tobacco Comment    \"light smoking at 20 to look cool at the bars to  women\"       Social History     Substance and Sexual Activity   Alcohol Use Yes    Comment: 1/mo       Health Maintenance Review:  Immunization History   Administered Date(s) Administered    Influenza Vaccine 11/18/2017    Tdap 02/09/2018     Colonoscopy: Referred 1/28/2015, procedure completed and results? Done [facility], negative 10 yr follow up  ? PSA - Pros and cons of PSA screening discussed, patient elects to have the test.          Patient Active Problem List   Diagnosis Code    Sleep apnea G47.30    GERD (gastroesophageal reflux disease) K21.9    Prediabetes R73.03    Right-sided low back pain with right-sided sciatica M54.41    Spinal stenosis M48.00    BMI 35.0-35.9,adult Z68.35    Vitamin D deficiency E55.9    Severe obesity ([address] E66.01    Skin tag L91.8    Skin growth D49.2         Current Outpatient Medications:     omeprazole (PRILOSEC) 20 mg capsule, TAKE 1 TABLET BY MOUTH DAILY. , Disp: 90 Cap, Rfl: 3    albuterol (PROVENTIL HFA, VENTOLIN HFA, PROAIR HFA) 90 mcg/actuation inhaler, Take 1-2 Puffs by inhalation every four (4) hours as needed for Wheezing., Disp: 1 Inhaler, Rfl: 1    cholecalciferol (VITAMIN D3) 5,000 unit capsule, Take 1 Cap by mouth daily. , Disp: 365 Cap, Rfl: 99    cpap machine kit, by Does Not Apply route. G47.33 - Update CPAP set @ Auto, 10-15cm H20, heated humidifier, tubing, climate line, filters (6), chinstrap, integrated heated humidifier. Authorized for 2nd mask and headgear within one year if needed, Disp: 1 Kit, Rfl: 0         Review of Systems   Constitutional: Negative for chills, fever and weight loss.    HENT: Positive for sore throat (cought with eating, previous work up - sent to speech therapy, ? some aspiration on video). Negative for congestion and hearing loss. Eyes: Negative for blurred vision and double vision. Correctve lenses   Respiratory: Negative for cough, shortness of breath and wheezing. Cardiovascular: Negative for chest pain, palpitations and leg swelling. Gastrointestinal: Positive for heartburn (takes omeprazole). Negative for abdominal pain, blood in stool, constipation, diarrhea, melena, nausea and vomiting. Genitourinary: Negative for dysuria and urgency. Musculoskeletal: Positive for joint pain (left hand , volar great MCP after fall on outstretched hand a month ago-reports that symptoms are now resolving). Negative for myalgias. Right plantar foot pain, \"bump\" x 2 weeks, no trauma, feels like a \"marble in his shoe\"   Skin: Negative for itching and rash. Neurological: Positive for tingling (tingling right leg and foot when driving, Hx back surgery). Negative for dizziness, sensory change, focal weakness and headaches. Endo/Heme/Allergies: Negative for environmental allergies. Psychiatric/Behavioral: Negative for depression. The patient is not nervous/anxious and does not have insomnia. Visit Vitals  /74 (BP 1 Location: Left arm, BP Patient Position: Sitting)   Pulse (!) 56   Temp 97.5 °F (36.4 °C) (Oral)   Resp 14   Ht 5' 9\" (1.753 m)   Wt 239 lb 12.8 oz (108.8 kg)   SpO2 97%   BMI 35.41 kg/m²       Physical Exam   Constitutional: He is oriented to person, place, and time. He appears well-developed and well-nourished. HENT:   Head: Normocephalic. Right Ear: Tympanic membrane and ear canal normal.   Left Ear: Tympanic membrane and ear canal normal.   Mouth/Throat: Oropharynx is clear and moist.   Eyes: Pupils are equal, round, and reactive to light. Conjunctivae and EOM are normal.   Neck: Neck supple. Cardiovascular: Normal rate, regular rhythm, normal heart sounds and intact distal pulses.    Pulmonary/Chest: Effort normal and breath sounds normal.   Abdominal: Soft. Bowel sounds are normal. There is no tenderness. Musculoskeletal: He exhibits no edema. Left thumb with full range of motion normal strength against resistance with regard to extension and flexion minimal tenderness at the MCP area    Right foot with no skin lesions noted on the plantar surface, tenderness to palpation proximal to the second toe, no palpable bony deformity, negative squeeze test   Neurological: He is alert and oriented to person, place, and time. He has normal reflexes. Skin: Skin is warm and dry. Psychiatric: He has a normal mood and affect. His behavior is normal.   Nursing note and vitals reviewed. ASSESSMENT and PLAN    ICD-10-CM ICD-9-CM    1. Routine general medical examination at a health care facility Z00.00 V70.0 CBC WITH AUTOMATED DIFF      HEMOGLOBIN A1C WITH EAG      LIPID PANEL      METABOLIC PANEL, COMPREHENSIVE      URINALYSIS W/ RFLX MICROSCOPIC      TSH 3RD GENERATION      PSA SCREENING (SCREENING)      VITAMIN D, 25 HYDROXY   2. Right foot pain M79.671 729.5 REFERRAL TO PODIATRY   3. Hand strain, left, initial encounter R67.622N 842.10    4. Severe obesity ([address] E66.01 278.01    Lab today further disposition pending lab results  With regard to the left hand pain: Apply warm wet compresses frequently, avoid painful activity, take OTC analgesics such as ibuprofen or acetaminophen as needed. Podiatry referral  Avoid dietary starch and sugar and follow a program of regular aerobic exercise  Anticipatory guidance and recommendations provided verbally and with printed information. Return for annual physical in 1 year, sooner with any problems.

## 2019-08-22 NOTE — PROGRESS NOTES
HISTORY OF PRESENT ILLNESS  Cary Lesch is a 64 y.o. male. Presents to establish care following the departure of his prior PCP from the practice also reporting hand injury and foot pain    Establish Care   The history is provided by the patient and medical records. Hand Injury    The history is provided by the patient and medical records. This is a new problem. Foot Pain   The history is provided by the patient and medical records.        ROS    Physical Exam    ASSESSMENT and PLAN  {ASSESSMENT/PLAN:09779}

## 2019-08-23 DIAGNOSIS — E55.9 VITAMIN D INSUFFICIENCY: Primary | ICD-10-CM

## 2019-08-23 LAB
25(OH)D3+25(OH)D2 SERPL-MCNC: 25.3 NG/ML (ref 30–100)
ALBUMIN SERPL-MCNC: 4.3 G/DL (ref 3.5–5.5)
ALBUMIN/GLOB SERPL: 2 {RATIO} (ref 1.2–2.2)
ALP SERPL-CCNC: 73 IU/L (ref 39–117)
ALT SERPL-CCNC: 34 IU/L (ref 0–44)
APPEARANCE UR: CLEAR
AST SERPL-CCNC: 29 IU/L (ref 0–40)
BASOPHILS # BLD AUTO: 0.1 X10E3/UL (ref 0–0.2)
BASOPHILS NFR BLD AUTO: 1 %
BILIRUB SERPL-MCNC: 0.3 MG/DL (ref 0–1.2)
BILIRUB UR QL STRIP: NEGATIVE
BUN SERPL-MCNC: 15 MG/DL (ref 6–24)
BUN/CREAT SERPL: 15 (ref 9–20)
CALCIUM SERPL-MCNC: 9.2 MG/DL (ref 8.7–10.2)
CHLORIDE SERPL-SCNC: 106 MMOL/L (ref 96–106)
CHOLEST SERPL-MCNC: 157 MG/DL (ref 100–199)
CO2 SERPL-SCNC: 23 MMOL/L (ref 20–29)
COLOR UR: YELLOW
CREAT SERPL-MCNC: 1.02 MG/DL (ref 0.76–1.27)
EOSINOPHIL # BLD AUTO: 0.2 X10E3/UL (ref 0–0.4)
EOSINOPHIL NFR BLD AUTO: 3 %
ERYTHROCYTE [DISTWIDTH] IN BLOOD BY AUTOMATED COUNT: 14 % (ref 12.3–15.4)
EST. AVERAGE GLUCOSE BLD GHB EST-MCNC: 128 MG/DL
GLOBULIN SER CALC-MCNC: 2.2 G/DL (ref 1.5–4.5)
GLUCOSE SERPL-MCNC: 111 MG/DL (ref 65–99)
GLUCOSE UR QL: NEGATIVE
HBA1C MFR BLD: 6.1 % (ref 4.8–5.6)
HCT VFR BLD AUTO: 47.2 % (ref 37.5–51)
HDLC SERPL-MCNC: 35 MG/DL
HGB BLD-MCNC: 15.8 G/DL (ref 13–17.7)
HGB UR QL STRIP: NEGATIVE
IMM GRANULOCYTES # BLD AUTO: 0 X10E3/UL (ref 0–0.1)
IMM GRANULOCYTES NFR BLD AUTO: 0 %
INTERPRETATION, 910389: NORMAL
KETONES UR QL STRIP: NEGATIVE
LDLC SERPL CALC-MCNC: 101 MG/DL (ref 0–99)
LEUKOCYTE ESTERASE UR QL STRIP: NEGATIVE
LYMPHOCYTES # BLD AUTO: 1.9 X10E3/UL (ref 0.7–3.1)
LYMPHOCYTES NFR BLD AUTO: 33 %
MCH RBC QN AUTO: 32 PG (ref 26.6–33)
MCHC RBC AUTO-ENTMCNC: 33.5 G/DL (ref 31.5–35.7)
MCV RBC AUTO: 96 FL (ref 79–97)
MICRO URNS: NORMAL
MONOCYTES # BLD AUTO: 0.5 X10E3/UL (ref 0.1–0.9)
MONOCYTES NFR BLD AUTO: 9 %
NEUTROPHILS # BLD AUTO: 3.1 X10E3/UL (ref 1.4–7)
NEUTROPHILS NFR BLD AUTO: 54 %
NITRITE UR QL STRIP: NEGATIVE
PH UR STRIP: 5 [PH] (ref 5–7.5)
PLATELET # BLD AUTO: 180 X10E3/UL (ref 150–450)
POTASSIUM SERPL-SCNC: 4.7 MMOL/L (ref 3.5–5.2)
PROT SERPL-MCNC: 6.5 G/DL (ref 6–8.5)
PROT UR QL STRIP: NEGATIVE
PSA SERPL-MCNC: 1.1 NG/ML (ref 0–4)
RBC # BLD AUTO: 4.93 X10E6/UL (ref 4.14–5.8)
SODIUM SERPL-SCNC: 143 MMOL/L (ref 134–144)
SP GR UR: 1.02 (ref 1–1.03)
TRIGL SERPL-MCNC: 105 MG/DL (ref 0–149)
TSH SERPL DL<=0.005 MIU/L-ACNC: 1.5 UIU/ML (ref 0.45–4.5)
UROBILINOGEN UR STRIP-MCNC: 0.2 MG/DL (ref 0.2–1)
VLDLC SERPL CALC-MCNC: 21 MG/DL (ref 5–40)
WBC # BLD AUTO: 5.7 X10E3/UL (ref 3.4–10.8)

## 2019-08-23 RX ORDER — ACETAMINOPHEN 500 MG
TABLET ORAL
Qty: 180 CAP | Refills: 3 | Status: SHIPPED | OUTPATIENT
Start: 2019-08-23 | End: 2020-11-17 | Stop reason: SDUPTHER

## 2019-08-23 NOTE — PROGRESS NOTES
Please advise that lab results are essentially normal except for the hemoglobin A1c which has increased to 6.1% compared with 6.0% when last checked. This measures the average glucose over the past 3 months and is consistent with a level of 128 on average. This is consistent with prediabetes underscoring the need to avoid dietary starch and sugar and follow a program of regular aerobic exercise. Also, the vitamin D level is a little low. A prescription will be sent to his pharmacy for cholecalciferol 2000 unit capsules and he should take 2 capsules daily with plans to recheck hemoglobin A1c and a vitamin D level in 6 months.

## 2019-12-03 ENCOUNTER — OFFICE VISIT (OUTPATIENT)
Dept: FAMILY MEDICINE CLINIC | Age: 59
End: 2019-12-03

## 2019-12-03 VITALS
HEIGHT: 69 IN | RESPIRATION RATE: 18 BRPM | HEART RATE: 59 BPM | OXYGEN SATURATION: 95 % | DIASTOLIC BLOOD PRESSURE: 85 MMHG | WEIGHT: 238 LBS | SYSTOLIC BLOOD PRESSURE: 143 MMHG | TEMPERATURE: 96 F | BODY MASS INDEX: 35.25 KG/M2

## 2019-12-03 DIAGNOSIS — J06.9 ACUTE URI: Primary | ICD-10-CM

## 2019-12-03 LAB
S PYO AG THROAT QL: NEGATIVE
VALID INTERNAL CONTROL?: YES

## 2019-12-03 RX ORDER — BENZONATATE 200 MG/1
200 CAPSULE ORAL
Qty: 20 CAP | Refills: 0 | Status: SHIPPED | OUTPATIENT
Start: 2019-12-03 | End: 2019-12-03 | Stop reason: SDUPTHER

## 2019-12-03 RX ORDER — BENZONATATE 200 MG/1
200 CAPSULE ORAL
Qty: 20 CAP | Refills: 0 | Status: SHIPPED | OUTPATIENT
Start: 2019-12-03 | End: 2019-12-12 | Stop reason: SDUPTHER

## 2019-12-03 NOTE — PATIENT INSTRUCTIONS

## 2019-12-03 NOTE — PROGRESS NOTES
Stephany Ramos is a 64 y.o. male (: 1960) presenting to address:    Chief Complaint   Patient presents with    Cough     times one week    Nasal Congestion       Vitals:    19 1140   BP: 143/85   Pulse: (!) 59   Resp: 18   Temp: 96 °F (35.6 °C)   TempSrc: Oral   SpO2: 95%   Weight: 238 lb (108 kg)   Height: 5' 9\" (1.753 m)   PainSc:   0 - No pain       Hearing/Vision:   No exam data present    Learning Assessment:     Learning Assessment 2014   PRIMARY LEARNER Patient   HIGHEST LEVEL OF EDUCATION - PRIMARY LEARNER  -   BARRIERS PRIMARY LEARNER -   PRIMARY LANGUAGE ENGLISH   LEARNER PREFERENCE PRIMARY READING   ANSWERED BY patient   RELATIONSHIP SELF     Depression Screening:     3 most recent PHQ Screens 2019   PHQ Not Done -   Little interest or pleasure in doing things Not at all   Feeling down, depressed, irritable, or hopeless Not at all   Total Score PHQ 2 0     Fall Risk Assessment:     Fall Risk Assessment, last 12 mths 2019   Able to walk? Yes   Fall in past 12 months? No     Abuse Screening:     Abuse Screening Questionnaire 2019   Do you ever feel afraid of your partner? N   Are you in a relationship with someone who physically or mentally threatens you? N   Is it safe for you to go home? Y     Coordination of Care Questionaire:   1. Have you been to the ER, urgent care clinic since your last visit? Hospitalized since your last visit? NO    2. Have you seen or consulted any other health care providers outside of the [address] [place] [address] since your last visit? Include any pap smears or colon screening. YES  Podiatry for bump on foot. Advanced Directive:   1. Do you have an Advanced Directive? NO    2. Would you like information on Advanced Directives?  NO

## 2019-12-03 NOTE — PROGRESS NOTES
HISTORY OF PRESENT ILLNESS  Thomas Barajas is a 64 y.o. male. HPI  C/o started last week with congestion, sore throat and dry cough, no wheezing, no fever  Review of Systems   Constitutional: Negative for chills and fever. HENT: Negative for ear pain and sinus pain. Respiratory: Negative for hemoptysis and sputum production. Cardiovascular: Negative for chest pain. Physical Exam  Vitals signs reviewed. Constitutional:       Appearance: Normal appearance. HENT:      Right Ear: Tympanic membrane and ear canal normal.      Left Ear: Tympanic membrane and ear canal normal.      Nose: Congestion present. Right Sinus: No maxillary sinus tenderness. Left Sinus: No maxillary sinus tenderness. Mouth/Throat:      Mouth: Mucous membranes are moist.      Pharynx: Posterior oropharyngeal erythema present. No oropharyngeal exudate. Cardiovascular:      Rate and Rhythm: Normal rate and regular rhythm. Pulmonary:      Effort: Pulmonary effort is normal. No respiratory distress. Breath sounds: Normal breath sounds. No wheezing. ASSESSMENT and PLAN  Diagnoses and all orders for this visit:    1. Acute URI, ? viral  -     AMB POC RAPID STREP A  -     benzonatate (TESSALON) 200 mg capsule; Take 1 Cap by mouth three (3) times daily as needed for Cough. -     loratadine-pseudoephedrine (CLARITIN-D 12 HOUR) 5-120 mg per tablet; Take 1 Tab by mouth two (2) times daily as needed (for congestion).   Results for orders placed or performed in visit on 12/03/19   AMB POC RAPID STREP A   Result Value Ref Range    VALID INTERNAL CONTROL POC Yes     Group A Strep Ag Negative Negative

## 2019-12-12 DIAGNOSIS — J06.9 ACUTE URI: ICD-10-CM

## 2019-12-12 RX ORDER — BENZONATATE 200 MG/1
200 CAPSULE ORAL
Qty: 20 CAP | Refills: 0 | Status: SHIPPED | OUTPATIENT
Start: 2019-12-12 | End: 2020-01-16 | Stop reason: ALTCHOICE

## 2020-01-16 ENCOUNTER — OFFICE VISIT (OUTPATIENT)
Dept: FAMILY MEDICINE CLINIC | Age: 60
End: 2020-01-16

## 2020-01-16 VITALS
HEIGHT: 69 IN | TEMPERATURE: 97.5 F | HEART RATE: 56 BPM | RESPIRATION RATE: 14 BRPM | WEIGHT: 240 LBS | DIASTOLIC BLOOD PRESSURE: 80 MMHG | SYSTOLIC BLOOD PRESSURE: 136 MMHG | OXYGEN SATURATION: 95 % | BODY MASS INDEX: 35.55 KG/M2

## 2020-01-16 DIAGNOSIS — S40.011A CONTUSION OF RIGHT SHOULDER, INITIAL ENCOUNTER: Primary | ICD-10-CM

## 2020-01-16 RX ORDER — LANOLIN ALCOHOL/MO/W.PET/CERES
1000 CREAM (GRAM) TOPICAL DAILY
COMMUNITY
End: 2020-11-17

## 2020-01-16 NOTE — PATIENT INSTRUCTIONS
Apply warm wet compresses frequently, avoid painful activity, take OTC analgesics such as ibuprofen or acetaminophen as needed. Follow exercise instructions. Follow-up for new symptoms worsening symptoms or failure to improve Rotator Cuff: Exercises Introduction Here are some examples of exercises for you to try. The exercises may be suggested for a condition or for rehabilitation. Start each exercise slowly. Ease off the exercises if you start to have pain. You will be told when to start these exercises and which ones will work best for you. How to do the exercises Pendulum swing 1. Hold on to a table or the back of a chair with your good arm. Then bend forward a little and let your sore arm hang straight down. This exercise does not use the arm muscles. Rather, use your legs and your hips to create movement that makes your arm swing freely. 2. Use the movement from your hips and legs to guide the slightly swinging arm back and forth like a pendulum (or elephant trunk). Then guide it in circles that start small (about the size of a dinner plate). Make the circles a bit larger each day, as your pain allows. 3. Do this exercise for 5 minutes, 5 to 7 times each day. 4. As you have less pain, try bending over a little farther to do this exercise. This will increase the amount of movement at your shoulder. Posterior stretching exercise 1. Hold the elbow of your injured arm with your other hand. 2. Use your hand to pull your injured arm gently up and across your body. You will feel a gentle stretch across the back of your injured shoulder. 3. Hold for at least 15 to 30 seconds. Then slowly lower your arm. 4. Repeat 2 to 4 times. Up-the-back stretch 1. Put your hand in your back pocket. Let it rest there to stretch your shoulder. 2. With your other hand, hold your injured arm (palm outward) behind your back by the wrist. Pull your arm up gently to stretch your shoulder. 3. Next, put a towel over your other shoulder. Put the hand of your injured arm behind your back. Now hold the back end of the towel. With the other hand, hold the front end of the towel in front of your body. Pull gently on the front end of the towel. This will bring your hand farther up your back to stretch your shoulder. Overhead stretch 1. Standing about an arm's length away, grasp onto a solid surface. You could use a countertop, a doorknob, or the back of a sturdy chair. 2. With your knees slightly bent, bend forward with your arms straight. Lower your upper body, and let your shoulders stretch. 3. As your shoulders are able to stretch farther, you may need to take a step or two backward. 4. Hold for at least 15 to 30 seconds. Then stand up and relax. If you had stepped back during your stretch, step forward so you can keep your hands on the solid surface. 5. Repeat 2 to 4 times. Shoulder flexion (lying down) 1. Lie on your back, holding a wand with both hands. Your palms should face down as you hold the wand. 2. Keeping your elbows straight, slowly raise your arms over your head. Raise them until you feel a stretch in your shoulders, upper back, and chest. 
3. Hold for 15 to 30 seconds. 4. Repeat 2 to 4 times. Shoulder rotation (lying down) 1. Lie on your back. Hold a wand with both hands with your elbows bent and palms up. 2. Keep your elbows close to your body, and move the wand across your body toward the sore arm. 3. Hold for 8 to 12 seconds. 4. Repeat 2 to 4 times. Wall climbing (to the side) 1. Stand with your side to a wall so that your fingers can just touch it at an angle about 30 degrees toward the front of your body. 2. Walk the fingers of your injured arm up the wall as high as pain permits. Try not to shrug your shoulder up toward your ear as you move your arm up. 3. Hold that position for a count of at least 15 to 20. 4. Walk your fingers back down to the starting position. 5. Repeat at least 2 to 4 times. Try to reach higher each time. Wall climbing (to the front) 1. Face a wall, and stand so your fingers can just touch it. 2. Keeping your shoulder down, walk the fingers of your injured arm up the wall as high as pain permits. (Don't shrug your shoulder up toward your ear.) 3. Hold your arm in that position for at least 15 to 30 seconds. 4. Slowly walk your fingers back down to where you started. 5. Repeat at least 2 to 4 times. Try to reach higher each time. Shoulder blade squeeze 1. Stand with your arms at your sides, and squeeze your shoulder blades together. Do not raise your shoulders up as you squeeze. 2. Hold 6 seconds. 3. Repeat 8 to 12 times. Scapular exercise: Arm reach 1. Lie flat on your back. This exercise is a very slight motion that starts with your arms raised (elbows straight, arms straight). 2. From this position, reach higher toward the gloria or ceiling. Keep your elbows straight. All motion should be from your shoulder blade only. 3. Relax your arms back to where you started. 4. Repeat 8 to 12 times. Arm raise to the side 1. Slowly raise your injured arm to the side, with your thumb facing up. Raise your arm 60 degrees at the most (shoulder level is 90 degrees). 2. Hold the position for 3 to 5 seconds. Then lower your arm back to your side. If you need to, bring your \"good\" arm across your body and place it under the elbow as you lower your injured arm. Use your good arm to keep your injured arm from dropping down too fast. 
3. Repeat 8 to 12 times. 4. When you first start out, don't hold any extra weight in your hand. As you get stronger, you may use a 1-pound to 2-pound dumbbell or a small can of food. Shoulder flexor and extensor exercise 1.  Push forward (flex): Stand facing a wall or doorjamb, about 6 inches or less back. Hold your injured arm against your body. Make a closed fist with your thumb on top. Then gently push your hand forward into the wall with about 25% to 50% of your strength. Don't let your body move backward as you push. Hold for about 6 seconds. Relax for a few seconds. Repeat 8 to 12 times. 2. Push backward (extend): Stand with your back flat against a wall. Your upper arm should be against the wall, with your elbow bent 90 degrees (your hand straight ahead). Push your elbow gently back against the wall with about 25% to 50% of your strength. Don't let your body move forward as you push. Hold for about 6 seconds. Relax for a few seconds. Repeat 8 to 12 times. Scapular exercise: Wall push-ups 1. Stand facing a wall, about 12 inches to 18 inches away. 2. Place your hands on the wall at shoulder height. 3. Slowly bend your elbows and bring your face to the wall. Keep your back and hips straight. 4. Push back to where you started. 5. Repeat 8 to 12 times. 6. When you can do this exercise against a wall comfortably, you can try it against a counter. You can then slowly progress to the end of a couch, then to a sturdy chair, and finally to the floor. Scapular exercise: Retraction 1. Put the band around a solid object at about waist level. (A bedpost will work well.) Each hand should hold an end of the band. 2. With your elbows at your sides and bent to 90 degrees, pull the band back. Your shoulder blades should move toward each other. Then move your arms back where you started. 3. Repeat 8 to 12 times. 4. If you have good range of motion in your shoulders, try this exercise with your arms lifted out to the sides. Keep your elbows at a 90-degree angle. Raise the elastic band up to about shoulder level. Pull the band back to move your shoulder blades toward each other. Then move your arms back where you started. Internal rotator strengthening exercise 1. Start by tying a piece of elastic exercise material to a doorknob. You can use surgical tubing or Thera-Band. 2. Stand or sit with your shoulder relaxed and your elbow bent 90 degrees. Your upper arm should rest comfortably against your side. Squeeze a rolled towel between your elbow and your body for comfort. This will help keep your arm at your side. 3. Hold one end of the elastic band in the hand of the painful arm. 4. Slowly rotate your forearm toward your body until it touches your belly. Slowly move it back to where you started. 5. Keep your elbow and upper arm firmly tucked against the towel roll or at your side. 6. Repeat 8 to 12 times. External rotator strengthening exercise 1. Start by tying a piece of elastic exercise material to a doorknob. You can use surgical tubing or Thera-Band. (You may also hold one end of the band in each hand.) 2. Stand or sit with your shoulder relaxed and your elbow bent 90 degrees. Your upper arm should rest comfortably against your side. Squeeze a rolled towel between your elbow and your body for comfort. This will help keep your arm at your side. 3. Hold one end of the elastic band with the hand of the painful arm. 4. Start with your forearm across your belly. Slowly rotate the forearm out away from your body. Keep your elbow and upper arm tucked against the towel roll or the side of your body until you begin to feel tightness in your shoulder. Slowly move your arm back to where you started. 5. Repeat 8 to 12 times. Follow-up care is a key part of your treatment and safety. Be sure to make and go to all appointments, and call your doctor if you are having problems. It's also a good idea to know your test results and keep a list of the medicines you take. Where can you learn more? Go to http://beny-emely.info/. Enter Babak Magallanes in the search box to learn more about \"Rotator Cuff: Exercises. \" Current as of: June 26, 2019 Content Version: 12.2 © 6197-5609 [employer]. Care instructions adapted under license by [employer] (which disclaims liability or warranty for this information). If you have questions about a medical condition or this instruction, always ask your healthcare professional. [address] any warranty or liability for your use of this information.

## 2020-01-16 NOTE — PROGRESS NOTES
HISTORY OF PRESENT ILLNESS  Lisandro Morris is a 64 y.o. male. Fall   The history is provided by the patient and medical records. Incident onset: A month ago. The fall occurred from a stool. He fell from a height of 1 - 2 ft. Pertinent negatives include no fever, no numbness and no tingling. Shoulder Pain    The history is provided by the patient and medical records. Incident onset: A month ago. The incident occurred at home. The injury mechanism was a fall. The right shoulder is affected. The pain has been fluctuating (Pain exacerbated by certain movements) since onset. The pain does not radiate. There is no history of shoulder injury. Pertinent negatives include no numbness, no muscle weakness and no tingling. Mr#: [id number]      Patient Active Problem List   Diagnosis Code    Sleep apnea G47.30    GERD (gastroesophageal reflux disease) K21.9    Prediabetes R73.03    Right-sided low back pain with right-sided sciatica M54.41    Spinal stenosis M48.00    Vitamin D deficiency E55.9    Severe obesity (HCC) E66.01    Skin tag L91.8    Skin growth D49.2         Current Outpatient Medications:     cyanocobalamin (VITAMIN B-12) 1,000 mcg tablet, Take 1,000 mcg by mouth daily. , Disp: , Rfl:     cholecalciferol (VITAMIN D3) 2,000 unit cap capsule, Take 2 capsules daily, Disp: 180 Cap, Rfl: 3    omeprazole (PRILOSEC) 20 mg capsule, TAKE 1 TABLET BY MOUTH DAILY. , Disp: 90 Cap, Rfl: 3    cpap machine kit, by Does Not Apply route. G47.33 - Update CPAP set @ Auto, 10-15cm H20, heated humidifier, tubing, climate line, filters (6), chinstrap, integrated heated humidifier. Authorized for 2nd mask and headgear within one year if needed, Disp: 1 Kit, Rfl: 0     Allergies   Allergen Reactions    Lyrica [Pregabalin] Other (comments)     Weight gain         Review of Systems   Constitutional: Negative for fever and weight loss. Neurological: Negative for tingling, sensory change, focal weakness and numbness. Visit Vitals  /80 (BP 1 Location: Left arm, BP Patient Position: Sitting)   Pulse (!) 56   Temp 97.5 °F (36.4 °C) (Oral)   Resp 14   Ht 5' 9\" (1.753 m)   Wt 240 lb (108.9 kg)   SpO2 95%   BMI 35.44 kg/m²       Physical Exam  Vitals signs and nursing note reviewed. Constitutional:       Appearance: He is well-developed. HENT:      Head: Normocephalic. Neck:      Musculoskeletal: Neck supple. Cardiovascular:      Rate and Rhythm: Normal rate and regular rhythm. Heart sounds: Normal heart sounds. Pulmonary:      Effort: Pulmonary effort is normal.      Breath sounds: Normal breath sounds. Musculoskeletal:         General: No swelling, tenderness or deformity. Comments: Right shoulder non tender with full range of motion with some discomfort associated with internal rotation while in abduction and with a combination of extension and external rotation. Rotator cuff muscle group strength appears to be intact against resistance. Skin:     General: Skin is warm and dry. Neurological:      Mental Status: He is alert and oriented to person, place, and time. Psychiatric:         Behavior: Behavior normal.         ASSESSMENT and PLAN    ICD-10-CM ICD-9-CM    1. Contusion of right shoulder, initial encounter S40.011A 923.00    Apply warm wet compresses frequently, avoid painful activity, take OTC analgesics such as ibuprofen or acetaminophen as needed. Follow exercise instructions.   Follow-up for new symptoms worsening symptoms or failure to improve

## 2020-01-16 NOTE — PROGRESS NOTES
Lucas Rodriguez is a 64 y.o. male (: 1960) presenting to address:    Chief Complaint   Patient presents with    Shoulder Injury     pt fell 4 weeks ago w/pain that increases w/motion; received flu vaccine       Vitals:    20 1356   BP: 136/80   Pulse: (!) 56   Resp: 14   Temp: 97.5 °F (36.4 °C)   TempSrc: Oral   SpO2: 95%   Weight: 240 lb (108.9 kg)   Height: 5' 9\" (1.753 m)   PainSc:   0 - No pain       Hearing/Vision:   No exam data present    Learning Assessment:     Learning Assessment 2014   PRIMARY LEARNER Patient   HIGHEST LEVEL OF EDUCATION - PRIMARY LEARNER  -   BARRIERS PRIMARY LEARNER -   PRIMARY LANGUAGE ENGLISH   LEARNER PREFERENCE PRIMARY READING   ANSWERED BY patient   RELATIONSHIP SELF     Depression Screening:     3 most recent PHQ Screens 2020   PHQ Not Done -   Little interest or pleasure in doing things Not at all   Feeling down, depressed, irritable, or hopeless Not at all   Total Score PHQ 2 0     Fall Risk Assessment:     Fall Risk Assessment, last 12 mths 2019   Able to walk? Yes   Fall in past 12 months? No     Abuse Screening:     Abuse Screening Questionnaire 2019   Do you ever feel afraid of your partner? N   Are you in a relationship with someone who physically or mentally threatens you? N   Is it safe for you to go home? Y     Coordination of Care Questionaire:   1. Have you been to the ER, urgent care clinic since your last visit? Hospitalized since your last visit? NO    2. Have you seen or consulted any other health care providers outside of the [address] [place] [address] since your last visit? Include any pap smears or colon screening. NO    Advanced Directive:   1. Do you have an Advanced Directive? NO    2. Would you like information on Advanced Directives?  NO

## 2020-08-03 ENCOUNTER — VIRTUAL VISIT (OUTPATIENT)
Dept: FAMILY MEDICINE CLINIC | Age: 60
End: 2020-08-03

## 2020-08-03 DIAGNOSIS — R13.10 SWALLOWING DYSFUNCTION: Primary | ICD-10-CM

## 2020-08-03 DIAGNOSIS — R05.9 COUGH IN ADULT: ICD-10-CM

## 2020-08-03 DIAGNOSIS — R55 NEAR SYNCOPE: ICD-10-CM

## 2020-08-03 RX ORDER — PREDNISONE 10 MG/1
TABLET ORAL
Qty: 46 TAB | Refills: 0 | Status: SHIPPED | OUTPATIENT
Start: 2020-08-03 | End: 2020-11-17 | Stop reason: ALTCHOICE

## 2020-08-03 RX ORDER — ALBUTEROL SULFATE 90 UG/1
2 AEROSOL, METERED RESPIRATORY (INHALATION)
Qty: 1 INHALER | Refills: 2 | Status: SHIPPED | OUTPATIENT
Start: 2020-08-03 | End: 2021-02-17

## 2020-08-03 RX ORDER — PREDNISONE 10 MG/1
TABLET ORAL
Qty: 46 TAB | Refills: 0 | Status: SHIPPED | OUTPATIENT
Start: 2020-08-03 | End: 2020-08-03 | Stop reason: CLARIF

## 2020-08-03 NOTE — PATIENT INSTRUCTIONS
Albuterol inhaler 2 puffs up to every 4 hours as needed for cough, shortness of breath Prednisone 10 mg-6 tablets daily in AM with food x 4 days, 4 tablets daily in AM with food x 4 days, 2 tablets daily in AM with food x 2 days, 1 tablet daily in AM with food x 2 days, then stop Otolaryngology referral 
Report new or worsening symptoms

## 2020-08-03 NOTE — PROGRESS NOTES
Yuki Santana is a 64 y.o. male who was seen by synchronous (real-time) audio-video technology using doxy. me on 8/3/2020. Mr#: [id number]    Consent:  He and/or his healthcare decision maker is aware that this patient-initiated Telehealth encounter is a billable service, with coverage as determined by his insurance carrier. He is aware that he may receive a bill and has provided verbal consent to proceed: YES    I was in the office while conducting this encounter. 712  HPI/Subjective:     He reports severe episodes of coughing twice last week while driving with associated symptoms of near syncope. He notes a long history of unexplained cough but never with this severe associated symptoms. He indicates that the cough invariably occurs after eating, drinking or even swallowing. EGD last year showed some esophageal erythema and an irregular Z line but otherwise exam and biopsies were unremarkable. He reports that he has had video swallowing studies in the past and that the last one showed some \"leakage\". He was treated with \"exercises\". Patient Active Problem List   Diagnosis Code    Sleep apnea G47.30    GERD (gastroesophageal reflux disease) K21.9    Prediabetes R73.03    Right-sided low back pain with right-sided sciatica M54.41    Spinal stenosis M48.00    Vitamin D deficiency E55.9    Severe obesity ([facility] [address] E66.01    Skin tag L91.8    Skin growth D49. 2            Allergies   Allergen Reactions    Lyrica [Pregabalin] Other (comments)     Weight gain         Current Outpatient Medications:     cyanocobalamin (VITAMIN B-12) 1,000 mcg tablet, Take 1,000 mcg by mouth daily. , Disp: , Rfl:     cholecalciferol (VITAMIN D3) 2,000 unit cap capsule, Take 2 capsules daily, Disp: 180 Cap, Rfl: 3    omeprazole (PRILOSEC) 20 mg capsule, TAKE 1 TABLET BY MOUTH DAILY. , Disp: 90 Cap, Rfl: 3    cpap machine kit, by Does Not Apply route.  G47.33 - Update CPAP set @ Auto, 10-15cm H20, heated humidifier, tubing, climate line, filters (6), chinstrap, integrated heated humidifier. Authorized for 2nd mask and headgear within one year if needed, Disp: 1 Kit, Rfl: 0      Review of Systems   Constitutional: Negative for fever. HENT: Negative for sore throat. Respiratory: Positive for cough (coughing fit last week, felt like he might pass out) and shortness of breath ( with cough). Cardiovascular: Negative for chest pain and palpitations. Gastrointestinal: Negative for abdominal pain, diarrhea, nausea and vomiting. Musculoskeletal: Negative for myalgias. Neurological: Negative for headaches. PHYSICAL EXAMINATION:    Constitutional: [x] Appears well-developed and well-nourished [x] No apparent distress        Mental status: [x] Alert and awake  [x] Oriented t [x] Able to follow commands      Eyes:   EOM    [x]  Normal        HENT: [x] Normocephalic,      Pulmonary/Chest: [x] Respiratory effort normal   [x] No visualized signs of difficulty breathing or respiratory distress           Musculoskeletal:   [x] No obvious deformities noted with regard to areas viewed           Neurological:        [x] No apparent neurologic deficits noted in areas viewed                 Skin:        [x] No apparent dermatologic abnormalities noted in areas viewed               Psychiatric:       [x] Normal Affect      Other pertinent observable physical exam findings:-None noted          Assessment & Plan:   Diagnoses and all orders for this visit:    1. Swallowing dysfunction  -     REFERRAL TO ENT-OTOLARYNGOLOGY    2. Cough in adult  -     albuterol (PROVENTIL HFA, VENTOLIN HFA, PROAIR HFA) 90 mcg/actuation inhaler; Take 2 Puffs by inhalation every four (4) hours as needed for Shortness of Breath or Cough. -     predniSONE (DELTASONE) 10 mg tablet; 6 tablets daily in AM with food x 4 days, 4 tablets daily x 4 days, 2 tablets daily x 2 days, 1 tablet daily  x 2 days, then stop    3.  Near syncope    Albuterol inhaler 2 puffs up to every 4 hours as needed for cough, shortness of breath  Prednisone 10 mg-6 tablets daily in AM with food x 4 days, 4 tablets daily in AM with food x 4 days, 2 tablets daily in AM with food x 2 days, 1 tablet daily in AM with food x 2 days, then stop  Otolaryngology referral  Report new or worsening symptoms      I advised him to contact the office if his condition worsens, changes, fails to improve as anticipated and with any new problems. He expressed understanding with the diagnosis(es) and plan. [facility] the patient is at home and the provider is at [address] to the emergency declaration under the [facility], [facility] authority and the [place] and [employer] Act, this Virtual  Visit was conducted, with patient's consent, to reduce the patient's risk of exposure to COVID-19 and provide continuity of care for an established patient. Services were provided through a video synchronous discussion virtually to substitute for in-person clinic visit. Miri Nayak MD         PLEASE NOTE:   This document has been produced using voice recognition software. Unrecognized errors in transcription may be present.

## 2020-11-11 ENCOUNTER — TELEPHONE (OUTPATIENT)
Dept: FAMILY MEDICINE CLINIC | Age: 60
End: 2020-11-11

## 2020-11-11 DIAGNOSIS — Z00.00 ROUTINE GENERAL MEDICAL EXAMINATION AT A HEALTH CARE FACILITY: Primary | ICD-10-CM

## 2020-11-11 DIAGNOSIS — E55.9 VITAMIN D DEFICIENCY: ICD-10-CM

## 2020-11-11 DIAGNOSIS — R73.03 PREDIABETES: ICD-10-CM

## 2020-11-11 DIAGNOSIS — K21.9 GASTROESOPHAGEAL REFLUX DISEASE, UNSPECIFIED WHETHER ESOPHAGITIS PRESENT: ICD-10-CM

## 2020-11-11 NOTE — TELEPHONE ENCOUNTER
Pt left a vm for the office requesting to be seen this week for labs before his VV. I do not see any labs ordered for him. Please advise if pt needs to have lab work done before his VV. Please let me know if orders will be placed so I can call him back.      Future Appointments   Date Time Provider Joey Hinton   11/17/2020  9:30 AM Jennifer Herring MD [facility]

## 2020-11-11 NOTE — TELEPHONE ENCOUNTER
Please change this to an in office appointment to include physical exam with a lab appointment at a time.   Orders have been entered

## 2020-11-13 ENCOUNTER — APPOINTMENT (OUTPATIENT)
Dept: FAMILY MEDICINE CLINIC | Age: 60
End: 2020-11-13

## 2020-11-16 LAB
25(OH)D3+25(OH)D2 SERPL-MCNC: 15.8 NG/ML (ref 30–100)
ALBUMIN SERPL-MCNC: 4.6 G/DL (ref 3.8–4.9)
ALBUMIN/GLOB SERPL: 2.2 {RATIO} (ref 1.2–2.2)
ALP SERPL-CCNC: 82 IU/L (ref 39–117)
ALT SERPL-CCNC: 39 IU/L (ref 0–44)
APPEARANCE UR: CLEAR
AST SERPL-CCNC: 35 IU/L (ref 0–40)
BASOPHILS # BLD AUTO: 0.1 X10E3/UL (ref 0–0.2)
BASOPHILS NFR BLD AUTO: 1 %
BILIRUB SERPL-MCNC: 0.7 MG/DL (ref 0–1.2)
BILIRUB UR QL STRIP: NEGATIVE
BUN SERPL-MCNC: 12 MG/DL (ref 6–24)
BUN/CREAT SERPL: 11 (ref 9–20)
CALCIUM SERPL-MCNC: 9.2 MG/DL (ref 8.7–10.2)
CHLORIDE SERPL-SCNC: 99 MMOL/L (ref 96–106)
CHOLEST SERPL-MCNC: 157 MG/DL (ref 100–199)
CO2 SERPL-SCNC: 28 MMOL/L (ref 20–29)
COLOR UR: YELLOW
CREAT SERPL-MCNC: 1.06 MG/DL (ref 0.76–1.27)
EOSINOPHIL # BLD AUTO: 0.1 X10E3/UL (ref 0–0.4)
EOSINOPHIL NFR BLD AUTO: 1 %
ERYTHROCYTE [DISTWIDTH] IN BLOOD BY AUTOMATED COUNT: 13.3 % (ref 11.6–15.4)
EST. AVERAGE GLUCOSE BLD GHB EST-MCNC: 137 MG/DL
GLOBULIN SER CALC-MCNC: 2.1 G/DL (ref 1.5–4.5)
GLUCOSE SERPL-MCNC: 84 MG/DL (ref 65–99)
GLUCOSE UR QL: NEGATIVE
HBA1C MFR BLD: 6.4 % (ref 4.8–5.6)
HCT VFR BLD AUTO: 48.6 % (ref 37.5–51)
HDLC SERPL-MCNC: 35 MG/DL
HGB BLD-MCNC: 16.7 G/DL (ref 13–17.7)
HGB UR QL STRIP: NEGATIVE
IMM GRANULOCYTES # BLD AUTO: 0 X10E3/UL (ref 0–0.1)
IMM GRANULOCYTES NFR BLD AUTO: 0 %
INTERPRETATION, 910389: NORMAL
KETONES UR QL STRIP: NEGATIVE
LDLC SERPL CALC-MCNC: 95 MG/DL (ref 0–99)
LEUKOCYTE ESTERASE UR QL STRIP: NEGATIVE
LYMPHOCYTES # BLD AUTO: 1.9 X10E3/UL (ref 0.7–3.1)
LYMPHOCYTES NFR BLD AUTO: 24 %
MCH RBC QN AUTO: 32.4 PG (ref 26.6–33)
MCHC RBC AUTO-ENTMCNC: 34.4 G/DL (ref 31.5–35.7)
MCV RBC AUTO: 94 FL (ref 79–97)
MICRO URNS: ABNORMAL
MONOCYTES # BLD AUTO: 0.7 X10E3/UL (ref 0.1–0.9)
MONOCYTES NFR BLD AUTO: 9 %
NEUTROPHILS # BLD AUTO: 5.1 X10E3/UL (ref 1.4–7)
NEUTROPHILS NFR BLD AUTO: 65 %
NITRITE UR QL STRIP: NEGATIVE
PH UR STRIP: 8.5 [PH] (ref 5–7.5)
PLATELET # BLD AUTO: 192 X10E3/UL (ref 150–450)
POTASSIUM SERPL-SCNC: 4.3 MMOL/L (ref 3.5–5.2)
PROT SERPL-MCNC: 6.7 G/DL (ref 6–8.5)
PROT UR QL STRIP: NEGATIVE
PSA SERPL-MCNC: 1.3 NG/ML (ref 0–4)
RBC # BLD AUTO: 5.16 X10E6/UL (ref 4.14–5.8)
SODIUM SERPL-SCNC: 140 MMOL/L (ref 134–144)
SP GR UR: <=1.005 (ref 1–1.03)
TRIGL SERPL-MCNC: 154 MG/DL (ref 0–149)
TSH SERPL DL<=0.005 MIU/L-ACNC: 1.32 UIU/ML (ref 0.45–4.5)
UROBILINOGEN UR STRIP-MCNC: 0.2 MG/DL (ref 0.2–1)
VLDLC SERPL CALC-MCNC: 27 MG/DL (ref 5–40)
WBC # BLD AUTO: 7.8 X10E3/UL (ref 3.4–10.8)

## 2020-11-17 ENCOUNTER — OFFICE VISIT (OUTPATIENT)
Dept: FAMILY MEDICINE CLINIC | Age: 60
End: 2020-11-17
Payer: OTHER GOVERNMENT

## 2020-11-17 VITALS
BODY MASS INDEX: 36.38 KG/M2 | HEART RATE: 54 BPM | DIASTOLIC BLOOD PRESSURE: 62 MMHG | HEIGHT: 69 IN | RESPIRATION RATE: 16 BRPM | SYSTOLIC BLOOD PRESSURE: 110 MMHG | TEMPERATURE: 97.3 F | WEIGHT: 245.6 LBS | OXYGEN SATURATION: 96 %

## 2020-11-17 DIAGNOSIS — E55.9 VITAMIN D DEFICIENCY: ICD-10-CM

## 2020-11-17 DIAGNOSIS — E66.01 SEVERE OBESITY (HCC): ICD-10-CM

## 2020-11-17 DIAGNOSIS — R05.3 PERSISTENT COUGH: ICD-10-CM

## 2020-11-17 DIAGNOSIS — Z23 ENCOUNTER FOR IMMUNIZATION: ICD-10-CM

## 2020-11-17 DIAGNOSIS — M48.07 SPINAL STENOSIS OF LUMBOSACRAL REGION: ICD-10-CM

## 2020-11-17 DIAGNOSIS — Z00.00 ROUTINE GENERAL MEDICAL EXAMINATION AT A HEALTH CARE FACILITY: Primary | ICD-10-CM

## 2020-11-17 DIAGNOSIS — R73.03 PREDIABETES: ICD-10-CM

## 2020-11-17 DIAGNOSIS — K21.9 GASTROESOPHAGEAL REFLUX DISEASE, UNSPECIFIED WHETHER ESOPHAGITIS PRESENT: ICD-10-CM

## 2020-11-17 PROCEDURE — 90750 HZV VACC RECOMBINANT IM: CPT | Performed by: FAMILY MEDICINE

## 2020-11-17 PROCEDURE — 99396 PREV VISIT EST AGE 40-64: CPT | Performed by: FAMILY MEDICINE

## 2020-11-17 PROCEDURE — 90471 IMMUNIZATION ADMIN: CPT | Performed by: FAMILY MEDICINE

## 2020-11-17 RX ORDER — ERGOCALCIFEROL 1.25 MG/1
50000 CAPSULE ORAL
Qty: 12 CAP | Refills: 4 | Status: SHIPPED | OUTPATIENT
Start: 2020-11-17 | End: 2021-11-16

## 2020-11-17 RX ORDER — ACETAMINOPHEN 500 MG
TABLET ORAL
Qty: 180 CAP | Refills: 3 | Status: SHIPPED | OUTPATIENT
Start: 2020-11-17

## 2020-11-17 NOTE — PROGRESS NOTES
Shingrix Immunization/s administered 11/17/2020 by [employer] with patients consent. Patient tolerated procedure well. No reactions noted.

## 2020-11-17 NOTE — PROGRESS NOTES
Caty Le is a 64 y.o. male (: 1960) presenting to address:    Chief Complaint   Patient presents with    Cough     causes pt to want to black out       Vitals:    20 0935   BP: 110/62   Pulse: (!) 54   Resp: 16   Temp: 97.3 °F (36.3 °C)   TempSrc: Temporal   SpO2: 96%   Weight: 245 lb 9.6 oz (111.4 kg)   Height: 5' 9\" (1.753 m)   PainSc:   0 - No pain       Hearing/Vision:   No exam data present    Learning Assessment:     Learning Assessment 2014   PRIMARY LEARNER Patient   HIGHEST LEVEL OF EDUCATION - PRIMARY LEARNER  -   BARRIERS PRIMARY LEARNER -   PRIMARY LANGUAGE ENGLISH   LEARNER PREFERENCE PRIMARY READING   ANSWERED BY patient   RELATIONSHIP SELF     Depression Screening:     3 most recent PHQ Screens 2020   [facility] Patient Decline   Little interest or pleasure in doing things Not at all   Feeling down, depressed, irritable, or hopeless Not at all   Total Score PHQ 2 0     Fall Risk Assessment:     Fall Risk Assessment, last 12 mths 2019   Able to walk? Yes   Fall in past 12 months? No     Abuse Screening:     Abuse Screening Questionnaire 2019   Do you ever feel afraid of your partner? N   Are you in a relationship with someone who physically or mentally threatens you? N   Is it safe for you to go home? Y     Coordination of Care Questionaire:   1. Have you been to the ER, urgent care clinic since your last visit? Hospitalized since your last visit? NO    2. Have you seen or consulted any other health care providers outside of the [address] [place] [address] since your last visit? Include any pap smears or colon screening. YES, ENYT    Advanced Directive:   1. Do you have an Advanced Directive? YES    2. Would you like information on Advanced Directives?  NO

## 2020-11-17 NOTE — PATIENT INSTRUCTIONS
Try bag rebreathing when possibly hyperventilating Continue current medications Resume ergocalciferol 50,000 units weekly and cholecalciferol 2000 units, 2 capsules daily and continue these indefinitely Avoid dietary starch and sugar and follow program of regular aerobic exercise Return for Shingrix #2 with nurse visit in 2-6 months

## 2020-11-17 NOTE — PROGRESS NOTES
HISTORY OF PRESENT ILLNESS  Erasmo Lacy is a 64 y.o. male. He presents for health assessment and preventative care with a history of persistent cough previously referred for otolaryngology evaluation with suspected aspiration, prediabetes, GERD, lumbar spinal stenosis and severe obesity. The otolaryngology consultant increased his omeprazole to 40 mg twice daily. Patient now reports that he has never had complete loss of consciousness but sometimes after a single cough feels sure that if he turns his head he will lose consciousness. He states that he continues to have more coughing after eating but not necessarily always with eating. This coughing with a sensation of possible pending syncope seems to happen most often when driving and has occurred on at least 2 occasions when about to drive over a long bridge however he feels sure that he has no anxiety or panic symptoms that might contribute to this. He states that if he is able to concentrate and looks straight ahead he can avoid any worsening symptoms. He has had episodic problems with cough for the past 5 years but it has become more of a problem recently. Mr#: [id number]      Past Medical History:   Diagnosis Date    Bulging lumbar disc June 2014    L5/S1 (seee MRI June 2014); Small non-impinging left posterior disc protrusion L5/S1,    Duodenitis 05/09/2019    Noted on EGD 5/9/19 - Dr. Samantha Landrum GERD (gastroesophageal reflux disease)     Uses PRN OTC Prilosec, doing well with low acid foods    Low back pain 5/29/2014    Prediabetes     HbA1C 5.9% 12/15    Right-sided low back pain with right-sided sciatica 07/01/2015    Anticipating surgery 5/30/17 stenosis S1, DDD  L4-S1 with fusion of L5 & S1    Sleep apnea     Has CPAP    Spinal stenosis June 2014    Lumbar spine MRI with mild degenerative spondylosis without high-grade stenosis.     Vitamin D deficiency 2/17/2018 2/14/18:  Vit D 15 --> start OTC Vit D3 5000 units       Past Surgical History:   Procedure Laterality Date    HX ACL RECONSTRUCTION  2004    HX ENDOSCOPY N/A 05/09/2019    Dr. Isiah Sesay, eval dysphagia, [place], report in [address] HX [address]  05/30/2017    [facility] - Dr. Maryjane De Souza        Family History   Problem Relation Age of Onset    Diabetes Maternal Grandfather        Allergies   Allergen Reactions    Lyrica [Pregabalin] Other (comments)     Weight gain       Social History     Tobacco Use   Smoking Status Never Smoker   Smokeless Tobacco Never Used   Tobacco Comment    \"light smoking at 27 to look cool at the bars to  women\"       Social History     Substance and Sexual Activity   Alcohol Use Yes    Comment: 1/mo           Patient Active Problem List   Diagnosis Code    Sleep apnea G47.30    GERD (gastroesophageal reflux disease) K21.9    Prediabetes R73.03    Right-sided low back pain with right-sided sciatica M54.41    Spinal stenosis M48.00    Vitamin D deficiency E55.9    Severe obesity ([address] E66.01    Skin tag L91.8    Skin growth D49.2         Current Outpatient Medications:     albuterol (PROVENTIL HFA, VENTOLIN HFA, PROAIR HFA) 90 mcg/actuation inhaler, Take 2 Puffs by inhalation every four (4) hours as needed for Shortness of Breath or Cough. , Disp: 1 Inhaler, Rfl: 2    predniSONE (DELTASONE) 10 mg tablet, 6 tablets daily in AM with food x 4 days, 4 tablets daily x 4 days, 2 tablets daily x 2 days, 1 tablet daily  x 2 days, then stop, Disp: 46 Tab, Rfl: 0    cyanocobalamin (VITAMIN B-12) 1,000 mcg tablet, Take 1,000 mcg by mouth daily. , Disp: , Rfl:     cholecalciferol (VITAMIN D3) 2,000 unit cap capsule, Take 2 capsules daily, Disp: 180 Cap, Rfl: 3    omeprazole (PRILOSEC) 20 mg capsule, TAKE 1 TABLET BY MOUTH DAILY. , Disp: 90 Cap, Rfl: 3    cpap machine kit, by Does Not Apply route.  G47.33 - Update CPAP set @ Auto, 10-15cm H20, heated humidifier, tubing, climate line, filters (6), chinstrap, integrated heated humidifier. Authorized for 2nd mask and headgear within one year if needed, Disp: 1 Kit, Rfl: 0       Review of Systems   Constitutional: Negative for chills, fever and weight loss. HENT: Negative for congestion, hearing loss and sore throat. Eyes: Negative for blurred vision and double vision. Respiratory: Positive for cough. Negative for shortness of breath and wheezing. Cardiovascular: Negative for chest pain, palpitations and leg swelling. Gastrointestinal: Positive for heartburn. Negative for abdominal pain, blood in stool, constipation, diarrhea, melena, nausea and vomiting. Genitourinary: Negative for dysuria and urgency. Musculoskeletal: Negative for joint pain and myalgias. Skin: Negative for itching and rash. Neurological: Positive for dizziness ( Lightheadedness with cough, see HPI) and tingling (right leg, Hx spinal stenosis). Negative for sensory change, focal weakness and headaches. Endo/Heme/Allergies: Negative for environmental allergies. Psychiatric/Behavioral: Negative for depression. The patient is not nervous/anxious and does not have insomnia. Visit Vitals  /62 (BP 1 Location: Left arm, BP Patient Position: Sitting)   Pulse (!) 54   Temp 97.3 °F (36.3 °C) (Temporal)   Resp 16   Ht 5' 9\" (1.753 m)   Wt 245 lb 9.6 oz (111.4 kg)   SpO2 96%   BMI 36.27 kg/m²       Physical Exam  Vitals signs and nursing note reviewed. Constitutional:       General: He is not in acute distress. Appearance: Normal appearance. He is obese. He is not ill-appearing. HENT:      Head: Normocephalic. Right Ear: Tympanic membrane, ear canal and external ear normal.      Left Ear: Tympanic membrane, ear canal and external ear normal.   Eyes:      Extraocular Movements: Extraocular movements intact. Conjunctiva/sclera: Conjunctivae normal.      Pupils: Pupils are equal, round, and reactive to light. Neck:      Musculoskeletal: Neck supple. Vascular: No carotid bruit. Cardiovascular:      Rate and Rhythm: Normal rate and regular rhythm. Heart sounds: Normal heart sounds. Pulmonary:      Effort: Pulmonary effort is normal.      Breath sounds: Normal breath sounds. Abdominal:      Palpations: Abdomen is soft. Tenderness: There is no abdominal tenderness. Musculoskeletal:         General: No deformity. Right lower leg: No edema. Left lower leg: No edema. Skin:     General: Skin is warm and dry. Neurological:      General: No focal deficit present. Mental Status: He is alert and oriented to person, place, and time. Psychiatric:         Mood and Affect: Mood normal.         Behavior: Behavior normal.         Thought Content: Thought content normal.       Results for [employer] [facility] (MRN [id number]) as of 11/17/2020 07:11   Ref. Range 8/22/2019 07:57 12/3/2019 12:05 11/13/2020 14:11   GFR est non-AA Latest Ref Range: >59 mL/min/1.73 81  77   GFR est AA Latest Ref Range: >59 mL/min/1.73 94  89   Bilirubin, total Latest Ref Range: 0.0 - 1.2 mg/dL 0.3  0.7   Protein, total Latest Ref Range: 6.0 - 8.5 g/dL 6.5  6.7   Albumin Latest Ref Range: 3.8 - 4.9 g/dL 4.3  4.6   A-G Ratio Latest Ref Range: 1.2 - 2.2  2.0  2.2   ALT Latest Ref Range: 0 - 44 IU/L 34  39   AST Latest Ref Range: 0 - 40 IU/L 29  35   Alk.  phosphatase Latest Ref Range: 39 - 117 IU/L 73  82   Triglyceride Latest Ref Range: 0 - 149 mg/dL 105  154 (H)   Cholesterol, total Latest Ref Range: 100 - 199 mg/dL 157  157   HDL Cholesterol Latest Ref Range: >39 mg/dL 35 (L)  35 (L)   VLDL, calculated Latest Ref Range: 5 - 40 mg/dL 21     LDL Cholesterol Latest Ref Range: 0 - 99 mg/dL   95   LDL, calculated Latest Ref Range: 0 - 99 mg/dL 101 (H)     Hemoglobin A1c, (calculated) Latest Ref Range: 4.8 - 5.6 % 6.1 (H)  6.4 (H)   Estimated average glucose Latest Units: mg/dL 128  137   TSH Latest Ref Range: 0.450 - 4.500 uIU/mL 1.500  1.320   Glucose Latest Ref Range: 65 - 99 mg/dL 111 (H)  84   Group A Strep Ag Latest Ref Range: Negative   Negative    Prostate Specific Ag Latest Ref Range: 0.0 - 4.0 ng/mL 1.1  1.3   VITAMIN D, 25-HYDROXY Latest Ref Range: 30.0 - 100.0 ng/mL 25.3 (L)  15.8 (L)     ASSESSMENT and PLAN    ICD-10-CM ICD-9-CM    1. Routine general medical examination at a health care facility  Z00.00 V70.0    2. Persistent cough  R05 786.2    3. Prediabetes  R73.03 790.29    4. Spinal stenosis of lumbosacral region  M48.07 724.02    5. Gastroesophageal reflux disease, unspecified whether esophagitis present  K21.9 530.81    6. Vitamin D deficiency  E55.9 268.9 cholecalciferol (VITAMIN D3) (2,000 UNITS /50 MCG) cap capsule      ergocalciferol (ERGOCALCIFEROL) 1,250 mcg (50,000 unit) capsule   7. Severe obesity ([facility] [address]  E66.01 278.01    8. Encounter for immunization  Z23 V03.89 ZOSTER VACC RECOMBINANT ADJUVANTED   Health maintenance:  Shingrix immunization- #1 today  Influenza immunization-1 month    Try bag rebreathing when possibly hyperventilating  Otolaryngology follow-up as recommended by the consultant  Continue current medications  Resume ergocalciferol 50,000 units weekly and cholecalciferol 2000 units, 2 capsules daily and continue these indefinitely  Avoid dietary starch and sugar and follow program of regular aerobic exercise  Return for Shingrix #2 with nurse visit in 2-6 months  Follow-up for new symptoms, worsening symptoms or failure to improve with regard to the cough and sensation of near syncope  Return for annual physical exam and follow-up in 1 year      PLEASE NOTE:   This document has been produced using voice recognition software. Unrecognized errors in transcription may be present.

## 2021-01-25 ENCOUNTER — TELEPHONE (OUTPATIENT)
Dept: FAMILY MEDICINE CLINIC | Age: 61
End: 2021-01-25

## 2021-01-25 NOTE — TELEPHONE ENCOUNTER
Patient called stating that he has been having blood in his stool for the past 2-4 days every first bowel movement of the day. Patient would like a returned call from the nurse.  Pt states that he he is still at work until [address] and states that he can be reached at work at [phone] or his cell at [phone]

## 2021-01-27 ENCOUNTER — OFFICE VISIT (OUTPATIENT)
Dept: FAMILY MEDICINE CLINIC | Age: 61
End: 2021-01-27
Payer: OTHER GOVERNMENT

## 2021-01-27 VITALS
TEMPERATURE: 98.6 F | HEIGHT: 69 IN | HEART RATE: 70 BPM | SYSTOLIC BLOOD PRESSURE: 124 MMHG | OXYGEN SATURATION: 97 % | BODY MASS INDEX: 35.28 KG/M2 | DIASTOLIC BLOOD PRESSURE: 78 MMHG | RESPIRATION RATE: 15 BRPM | WEIGHT: 238.2 LBS

## 2021-01-27 DIAGNOSIS — K92.1 HEMATOCHEZIA: Primary | ICD-10-CM

## 2021-01-27 DIAGNOSIS — N20.1 CALCULUS OF URETER: ICD-10-CM

## 2021-01-27 DIAGNOSIS — Z23 ENCOUNTER FOR IMMUNIZATION: ICD-10-CM

## 2021-01-27 PROCEDURE — 90471 IMMUNIZATION ADMIN: CPT | Performed by: FAMILY MEDICINE

## 2021-01-27 PROCEDURE — 90750 HZV VACC RECOMBINANT IM: CPT | Performed by: FAMILY MEDICINE

## 2021-01-27 PROCEDURE — 99213 OFFICE O/P EST LOW 20 MIN: CPT | Performed by: FAMILY MEDICINE

## 2021-01-27 RX ORDER — ONDANSETRON 4 MG/1
4 TABLET, ORALLY DISINTEGRATING ORAL
COMMUNITY
Start: 2020-12-14 | End: 2021-02-17

## 2021-01-27 RX ORDER — TAMSULOSIN HYDROCHLORIDE 0.4 MG/1
0.4 CAPSULE ORAL DAILY
COMMUNITY
End: 2021-02-17

## 2021-01-27 RX ORDER — OXYCODONE AND ACETAMINOPHEN 5; 325 MG/1; MG/1
1-2 TABLET ORAL
COMMUNITY
Start: 2020-12-14 | End: 2021-02-17

## 2021-01-27 NOTE — PATIENT INSTRUCTIONS
Shingrix immunization #2-today Gastroenterology referral 
Renal ultrasound Further disposition pending results of above, report new or worsening symptoms

## 2021-01-27 NOTE — PROGRESS NOTES
HISTORY OF PRESENT ILLNESS  Sakina Isabel is a 64 y.o. male. He reports 3-4 episodes over the past week or so bowel movements with significant amount of blood on the stool or mixed in the stool. He denies any pain with defecation or any change in bowel habits otherwise. He believes he had a negative colonoscopy 6 to 8 years ago. He also reports persistent episodic sharp right flank pains. He presented for emergency department evaluation with flank and abdominal pain on 12/14/2020 at which time a CT scan revealed a 4 mm stone in the proximal right ureter. He has been straining his urine but does not believe any stone has passed and continues to have episodic discomfort as noted above. Mr#: [id number]      Patient Active Problem List   Diagnosis Code    Sleep apnea G47.30    GERD (gastroesophageal reflux disease) K21.9    Prediabetes R73.03    Right-sided low back pain with right-sided sciatica M54.41    Spinal stenosis M48.00    Vitamin D deficiency E55.9    Severe obesity (HCC) E66.01    Skin tag L91.8    Skin growth D49.2         Current Outpatient Medications:     cholecalciferol (VITAMIN D3) (2,000 UNITS /50 MCG) cap capsule, Take 2 capsules daily, Disp: 180 Cap, Rfl: 3    ergocalciferol (ERGOCALCIFEROL) 1,250 mcg (50,000 unit) capsule, Take 1 Cap by mouth every seven (7) days. , Disp: 12 Cap, Rfl: 4    albuterol (PROVENTIL HFA, VENTOLIN HFA, PROAIR HFA) 90 mcg/actuation inhaler, Take 2 Puffs by inhalation every four (4) hours as needed for Shortness of Breath or Cough. , Disp: 1 Inhaler, Rfl: 2    omeprazole (PRILOSEC) 20 mg capsule, TAKE 1 TABLET BY MOUTH DAILY. , Disp: 90 Cap, Rfl: 3    cpap machine kit, by Does Not Apply route. G47.33 - Update CPAP set @ Auto, 10-15cm H20, heated humidifier, tubing, climate line, filters (6), chinstrap, integrated heated humidifier.  Authorized for 2nd mask and headgear within one year if needed, Disp: 1 Kit, Rfl: 0     Allergies   Allergen Reactions    Lyrica [Pregabalin] Other (comments)     Weight gain       Review of Systems   Constitutional: Negative for fever and weight loss. Respiratory: Negative for shortness of breath. Cardiovascular: Negative for chest pain. Gastrointestinal: Positive for blood in stool. Negative for abdominal pain, constipation, diarrhea, heartburn, nausea and vomiting. Description consistent with hematochezia, see HPI   Genitourinary: Positive for flank pain ( Episodic right flank pain see HPI). Negative for dysuria, frequency, hematuria and urgency. Physical Exam  Vitals signs and nursing note reviewed. Constitutional:       General: He is not in acute distress. Appearance: He is well-developed. He is obese. He is not ill-appearing. HENT:      Head: Normocephalic. Eyes:      Extraocular Movements: Extraocular movements intact. Neck:      Musculoskeletal: Neck supple. Cardiovascular:      Rate and Rhythm: Normal rate and regular rhythm. Heart sounds: Normal heart sounds. Pulmonary:      Effort: Pulmonary effort is normal.      Breath sounds: Normal breath sounds. Abdominal:      General: Bowel sounds are normal.      Palpations: Abdomen is soft. Tenderness: There is no abdominal tenderness. There is no right CVA tenderness or left CVA tenderness. Musculoskeletal:      Right lower leg: No edema. Left lower leg: No edema. Skin:     General: Skin is warm and dry. Neurological:      Mental Status: He is alert and oriented to person, place, and time. Psychiatric:         Behavior: Behavior normal.         ASSESSMENT and PLAN    ICD-10-CM ICD-9-CM    1. Hematochezia  K92.1 578.1 REFERRAL TO GASTROENTEROLOGY   2. Calculus of ureter  N20.1 592.1 US KIDNEY RT   3.  Encounter for immunization  Z23 V03.89 ZOSTER VACC RECOMBINANT ADJUVANTED      Health maintenance:  COVID-19 immunization recommended as soon as available  Shingrix immunization #2-today  Gastroenterology referral  Renal ultrasound  Further disposition pending results of above, report new or worsening symptoms  Chiara Romo MD      PLEASE NOTE:   This document has been produced using voice recognition software. Unrecognized errors in transcription may be present.

## 2021-01-27 NOTE — PROGRESS NOTES
Shingrix #2  Immunization/s administered 1/27/2021 by Kathy Mccabe LPN with patients consent. Patient tolerated procedure well. No reactions noted.

## 2021-01-27 NOTE — PROGRESS NOTES
Mateusz Wynne is a 64 y.o. male (: 1960) presenting to address:    Chief Complaint   Patient presents with    Melena       Vitals:    21 1400   BP: 124/78   Pulse: 70   Resp: 15   Temp: 98.6 °F (37 °C)   TempSrc: Temporal   SpO2: 97%   Weight: 238 lb 3.2 oz (108 kg)   Height: 5' 9\" (1.753 m)   PainSc:   0 - No pain       Hearing/Vision:   No exam data present    Learning Assessment:     Learning Assessment 2014   PRIMARY LEARNER Patient   HIGHEST LEVEL OF EDUCATION - PRIMARY LEARNER  -   BARRIERS PRIMARY LEARNER -   PRIMARY LANGUAGE ENGLISH   LEARNER PREFERENCE PRIMARY READING   ANSWERED BY patient   RELATIONSHIP SELF     Depression Screening:     3 most recent PHQ Screens 2021   PHQ Not Done -   Little interest or pleasure in doing things Not at all   Feeling down, depressed, irritable, or hopeless Not at all   Total Score PHQ 2 0     Fall Risk Assessment:     Fall Risk Assessment, last 12 mths 2019   Able to walk? Yes   Fall in past 12 months? No     Abuse Screening:     Abuse Screening Questionnaire 2019   Do you ever feel afraid of your partner? N   Are you in a relationship with someone who physically or mentally threatens you? N   Is it safe for you to go home? Y     Coordination of Care Questionaire:   1. Have you been to the ER, urgent care clinic since your last visit? Hospitalized since your last visit? YES, [facility]    2. Have you seen or consulted any other health care providers outside of the [address] [place] [address] since your last visit? Include any pap smears or colon screening. NO    Advanced Directive:   1. Do you have an Advanced Directive? NO    2. Would you like information on Advanced Directives?  YES

## 2021-01-29 ENCOUNTER — HOSPITAL ENCOUNTER (OUTPATIENT)
Dept: ULTRASOUND IMAGING | Age: 61
Discharge: HOME | End: 2021-01-29
Attending: FAMILY MEDICINE
Payer: OTHER GOVERNMENT

## 2021-01-29 DIAGNOSIS — N20.1 CALCULUS OF URETER: ICD-10-CM

## 2021-01-29 PROCEDURE — 76770 US EXAM ABDO BACK WALL COMP: CPT

## 2021-02-08 ENCOUNTER — TELEPHONE (OUTPATIENT)
Dept: FAMILY MEDICINE CLINIC | Age: 61
End: 2021-02-08

## 2021-02-08 DIAGNOSIS — N48.89 PENILE PAIN: Primary | ICD-10-CM

## 2021-02-08 NOTE — TELEPHONE ENCOUNTER
1. Very unlikely that there is a kidney stone causing symptoms not noted on the ultrasound. 2. Also extremely unlikely that a kidney stone would cause pain in the penile shaft. I would be happy to refer him for a urology evaluation if this is a persistent problem.

## 2021-02-08 NOTE — TELEPHONE ENCOUNTER
1. U/S showed no kidney stones; is there any way the kidney stone might not have been seen? 2. Starting on 2/4/2021 experiencing sharp pain in the shaft area, dull pain and gets sharper, radiating at times to a higher pain level, pain woke him him this past Saturday; should he be concerned?

## 2021-02-08 NOTE — TELEPHONE ENCOUNTER
Pt saw on the mychart is US results and he has some questions about that he is trying to have answered. Please return his call at the earliest convenience.

## 2021-04-16 ENCOUNTER — OFFICE VISIT (OUTPATIENT)
Dept: FAMILY MEDICINE CLINIC | Age: 61
End: 2021-04-16
Payer: OTHER GOVERNMENT

## 2021-04-16 VITALS
BODY MASS INDEX: 35.55 KG/M2 | RESPIRATION RATE: 16 BRPM | SYSTOLIC BLOOD PRESSURE: 145 MMHG | HEART RATE: 67 BPM | WEIGHT: 240 LBS | DIASTOLIC BLOOD PRESSURE: 84 MMHG | TEMPERATURE: 98 F | HEIGHT: 69 IN | OXYGEN SATURATION: 98 %

## 2021-04-16 DIAGNOSIS — M54.50 ACUTE RIGHT-SIDED LOW BACK PAIN WITHOUT SCIATICA: Primary | ICD-10-CM

## 2021-04-16 PROCEDURE — 99213 OFFICE O/P EST LOW 20 MIN: CPT | Performed by: NURSE PRACTITIONER

## 2021-04-16 RX ORDER — MELOXICAM 15 MG/1
15 TABLET ORAL DAILY
Qty: 14 TAB | Refills: 0 | Status: SHIPPED | OUTPATIENT
Start: 2021-04-16 | End: 2021-12-13

## 2021-04-16 RX ORDER — CYCLOBENZAPRINE HCL 10 MG
10 TABLET ORAL
Qty: 30 TAB | Refills: 0 | Status: SHIPPED | OUTPATIENT
Start: 2021-04-16 | End: 2021-04-26

## 2021-04-16 NOTE — PROGRESS NOTES
Eliza Savage is a 64 y.o. male (: 1960) presenting to address:    Chief Complaint   Patient presents with    Back Pain     lower right side x 3 week . leaning down and turned and felt it catch feeling . sitting and get up makes it worse once walking around improves . Vitals:    21 1405   BP: (!) 145/84   Pulse: 67   Resp: 16   Temp: 98 °F (36.7 °C)   TempSrc: Temporal   SpO2: 98%   Weight: 240 lb (108.9 kg)   Height: 5' 9\" (1.753 m)   PainSc:  10 - Worst pain ever   PainLoc: Back       Hearing/Vision:   No exam data present    Learning Assessment:     Learning Assessment 2014   PRIMARY LEARNER Patient   HIGHEST LEVEL OF EDUCATION - PRIMARY LEARNER  -   BARRIERS PRIMARY LEARNER -   PRIMARY LANGUAGE ENGLISH   LEARNER PREFERENCE PRIMARY READING   ANSWERED BY patient   RELATIONSHIP SELF     Depression Screening:     3 most recent PHQ Screens 2021   PHQ Not Done -   Little interest or pleasure in doing things Not at all   Feeling down, depressed, irritable, or hopeless Not at all   Total Score PHQ 2 0     Fall Risk Assessment:     Fall Risk Assessment, last 12 mths 2019   Able to walk? Yes   Fall in past 12 months? No     Abuse Screening:     Abuse Screening Questionnaire 2019   Do you ever feel afraid of your partner? N   Are you in a relationship with someone who physically or mentally threatens you? N   Is it safe for you to go home? Y     Coordination of Care Questionaire:   1. Have you been to the ER, urgent care clinic since your last visit? Hospitalized since your last visit? NO    2. Have you seen or consulted any other health care providers outside of the [address] [place] [address] since your last visit? Include any pap smears or colon screening. NO    Advanced Directive:   1. Do you have an Advanced Directive? NO    2. Would you like information on Advanced Directives?  NO

## 2021-04-16 NOTE — PROGRESS NOTES
HPI  Susanne Godinez is a 64y.o. year old male who presents today with back pain. Right lower back has been bothering for the last 3 weeks. Bent over to pick something up and felt a twinge. Has been doing ice and some stretches that have helped a little. Has been taking aleve which helps. Only taking one tab every few days because he used to over use it. No numbness, tingling or radiating pain. Past Medical History:   Diagnosis Date    Bulging lumbar disc June 2014    L5/S1 (seee MRI June 2014); Small non-impinging left posterior disc protrusion L5/S1,    Duodenitis 05/09/2019    Noted on EGD 5/9/19 - Dr. Bertha Latham GERD (gastroesophageal reflux disease)     Uses PRN OTC Prilosec, doing well with low acid foods    Low back pain 5/29/2014    Prediabetes     HbA1C 5.9% 12/15    Right-sided low back pain with right-sided sciatica 07/01/2015    Anticipating surgery 5/30/17 stenosis S1, DDD  L4-S1 with fusion of L5 & S1    Sleep apnea     Has CPAP    Spinal stenosis June 2014    Lumbar spine MRI with mild degenerative spondylosis without high-grade stenosis.     Vitamin D deficiency 2/17/2018 2/14/18:  Vit D 15 --> start OTC Vit D3 5000 units       Past Surgical History:   Procedure Laterality Date    HX ACL RECONSTRUCTION  2004    HX ENDOSCOPY N/A 05/09/2019    Dr. Ihor Dakin, eval dysphagia, [place], report in [facility]  05/30/2017    [facility] - Dr. Carolina Hogan History     Tobacco Use    Smoking status: Never Smoker    Smokeless tobacco: Never Used    Tobacco comment: \"light smoking at 20 to look cool at the bars to  women\"   Substance Use Topics    Alcohol use: Yes     Comment: 1/mo    Drug use: No         Current Outpatient Medications:     loratadine 10 mg cap, , Disp: , Rfl:     cholecalciferol (VITAMIN D3) (2,000 UNITS /50 MCG) cap capsule, Take 2 capsules daily, Disp: 180 Cap, Rfl: 3    ergocalciferol (ERGOCALCIFEROL) 1,250 mcg (50,000 unit) capsule, Take 1 Cap by mouth every seven (7) days. , Disp: 12 Cap, Rfl: 4    omeprazole (PRILOSEC) 20 mg capsule, TAKE 1 TABLET BY MOUTH DAILY. , Disp: 90 Cap, Rfl: 3    cpap machine kit, by Does Not Apply route. G47.33 - Update CPAP set @ Auto, 10-15cm H20, heated humidifier, tubing, climate line, filters (6), chinstrap, integrated heated humidifier. Authorized for 2nd mask and headgear within one year if needed, Disp: 1 Kit, Rfl: 0     Allergies   Allergen Reactions    Lyrica [Pregabalin] Other (comments)     Weight gain       Review of Systems   Musculoskeletal: Positive for back pain. All other systems reviewed and are negative. PE  BP (!) 145/84   Pulse 67   Temp 98 °F (36.7 °C) (Temporal)   Resp 16   Ht 5' 9\" (1.753 m)   Wt 240 lb (108.9 kg)   SpO2 98%   BMI 35.44 kg/m²     Physical Exam  Vitals signs reviewed. Constitutional:       General: He is not in acute distress. Appearance: Normal appearance. HENT:      Head: Normocephalic and atraumatic. Cardiovascular:      Rate and Rhythm: Normal rate and regular rhythm. Heart sounds: S1 normal and S2 normal. No murmur. No friction rub. No gallop. No S3 or S4 sounds. Pulmonary:      Effort: Pulmonary effort is normal.      Breath sounds: Normal breath sounds. No wheezing, rhonchi or rales. Musculoskeletal:      Lumbar back: He exhibits decreased range of motion, pain and spasm. He exhibits no tenderness, no bony tenderness and no swelling. Back:       Right lower leg: No edema. Left lower leg: No edema. Neurological:      Mental Status: He is alert and oriented to person, place, and time. Deep Tendon Reflexes:      Reflex Scores:       Patellar reflexes are 2+ on the right side and 2+ on the left side. Assessment/Plan  1.  Low back pain  Meloxicam 15 every day X14 days  Flexeril 10 TID PRN #30  Back exercises handout given  Continue ice, try heat  No heavy lifting, pushing, pulling  FU if symptoms worsen or do not improve

## 2021-04-16 NOTE — PATIENT INSTRUCTIONS

## 2021-06-07 NOTE — PROGRESS NOTES
HISTORY OF PRESENT ILLNESS  Keshawn Johnston is a 64 y.o. male. He presents with concern regarding thickened discolored toenails    Mr#: [id number]      Past Medical History:   Diagnosis Date    Bulging lumbar disc June 2014    L5/S1 (seee MRI June 2014); Small non-impinging left posterior disc protrusion L5/S1,    Duodenitis 05/09/2019    Noted on EGD 5/9/19 - Dr. Junior Lehman GERD (gastroesophageal reflux disease)     Uses PRN OTC Prilosec, doing well with low acid foods    Low back pain 5/29/2014    Prediabetes     HbA1C 5.9% 12/15    Right-sided low back pain with right-sided sciatica 07/01/2015    Anticipating surgery 5/30/17 stenosis S1, DDD  L4-S1 with fusion of L5 & S1    Sleep apnea     Has CPAP    Spinal stenosis June 2014    Lumbar spine MRI with mild degenerative spondylosis without high-grade stenosis.     Vitamin D deficiency 2/17/2018 2/14/18:  Vit D 15 --> start OTC Vit D3 5000 units       Past Surgical History:   Procedure Laterality Date    HX ACL RECONSTRUCTION  2004    HX ENDOSCOPY N/A 05/09/2019    Dr. Vick Pollard, eval dysphagia, [place], report in [address] [place] [address],Third Floor HX [address]  05/30/2017    [facility] - Dr. Jose A Morales        Family History   Problem Relation Age of Onset    Diabetes Maternal Grandfather        Allergies   Allergen Reactions    Lyrica [Pregabalin] Other (comments)     Weight gain       Social History     Tobacco Use   Smoking Status Never Smoker   Smokeless Tobacco Never Used   Tobacco Comment    \"light smoking at 20 to look cool at the bars to  women\"       Social History     Substance and Sexual Activity   Alcohol Use Yes    Comment: 1/mo           Patient Active Problem List   Diagnosis Code    Sleep apnea G47.30    GERD (gastroesophageal reflux disease) K21.9    Prediabetes R73.03    Right-sided low back pain with right-sided sciatica M54.41    Spinal stenosis M48.00    Vitamin D deficiency E55.9    Severe obesity ([address] E66.01    Skin tag L91.8    Skin growth D49.2         Current Outpatient Medications:     polyethylene glycol (Miralax) 17 gram/dose powder, Take  by mouth., Disp: , Rfl:     meloxicam (MOBIC) 15 mg tablet, Take 1 Tab by mouth daily. , Disp: 14 Tab, Rfl: 0    loratadine 10 mg cap, , Disp: , Rfl:     cholecalciferol (VITAMIN D3) (2,000 UNITS /50 MCG) cap capsule, Take 2 capsules daily, Disp: 180 Cap, Rfl: 3    ergocalciferol (ERGOCALCIFEROL) 1,250 mcg (50,000 unit) capsule, Take 1 Cap by mouth every seven (7) days. , Disp: 12 Cap, Rfl: 4    omeprazole (PRILOSEC) 20 mg capsule, TAKE 1 TABLET BY MOUTH DAILY. , Disp: 90 Cap, Rfl: 3    cpap machine kit, by Does Not Apply route. G47.33 - Update CPAP set @ Auto, 10-15cm H20, heated humidifier, tubing, climate line, filters (6), chinstrap, integrated heated humidifier. Authorized for 2nd mask and headgear within one year if needed, Disp: 1 Kit, Rfl: 0       Review of Systems   Skin:        Thickened toenails     Visit Vitals  /82 (BP 1 Location: Left upper arm, BP Patient Position: Sitting, BP Cuff Size: Large adult)   Pulse (!) 51   Temp 97.2 °F (36.2 °C) (Temporal)   Resp 15   Ht 5' 9\" (1.753 m)   Wt 239 lb (108.4 kg)   SpO2 97%   BMI 35.29 kg/m²       Physical Exam  Vitals and nursing note reviewed. Constitutional:       General: He is not in acute distress. Appearance: Normal appearance. He is well-developed. He is obese. He is not ill-appearing. HENT:      Head: Normocephalic. Eyes:      Extraocular Movements: Extraocular movements intact. Cardiovascular:      Rate and Rhythm: Normal rate. Skin:     Comments: Thickened, discolored, crumbling left second toenail, right second and fifth toenails   Neurological:      Mental Status: He is alert and oriented to person, place, and time. Psychiatric:         Mood and Affect: Mood normal.         Behavior: Behavior normal.         ASSESSMENT and PLAN    ICD-10-CM ICD-9-CM    1.  Onychomycosis of toenail  B35.1 110.1 terbinafine HCL (LAMISIL) 250 mg tablet   Assessment: Toenail onychomycosis      Plan:  Begin terbinafine 250 mg daily, anticipate a 3-month course but return for lab studies after 30 days and again after 60 days  Return for lab appointment followed by annual physical exam appointment in November    Virginia Reynolds MD      PLEASE NOTE:   This document has been produced using voice recognition software. Unrecognized errors in transcription may be present.

## 2021-06-08 ENCOUNTER — OFFICE VISIT (OUTPATIENT)
Dept: FAMILY MEDICINE CLINIC | Age: 61
End: 2021-06-08
Payer: OTHER GOVERNMENT

## 2021-06-08 VITALS
WEIGHT: 239 LBS | SYSTOLIC BLOOD PRESSURE: 120 MMHG | HEART RATE: 51 BPM | RESPIRATION RATE: 15 BRPM | DIASTOLIC BLOOD PRESSURE: 82 MMHG | HEIGHT: 69 IN | TEMPERATURE: 97.2 F | BODY MASS INDEX: 35.4 KG/M2 | OXYGEN SATURATION: 97 %

## 2021-06-08 DIAGNOSIS — Z79.899 LONG-TERM CURRENT USE OF HIGH RISK MEDICATION OTHER THAN ANTICOAGULANT: ICD-10-CM

## 2021-06-08 DIAGNOSIS — B35.1 ONYCHOMYCOSIS OF TOENAIL: Primary | ICD-10-CM

## 2021-06-08 PROCEDURE — 99213 OFFICE O/P EST LOW 20 MIN: CPT | Performed by: FAMILY MEDICINE

## 2021-06-08 RX ORDER — POLYETHYLENE GLYCOL 3350 17 G/17G
POWDER, FOR SOLUTION ORAL
COMMUNITY
Start: 2021-02-11 | End: 2021-12-13

## 2021-06-08 RX ORDER — TERBINAFINE HYDROCHLORIDE 250 MG/1
TABLET ORAL
Qty: 30 TABLET | Refills: 2 | Status: SHIPPED | OUTPATIENT
Start: 2021-06-08 | End: 2021-07-07

## 2021-06-08 NOTE — PATIENT INSTRUCTIONS
Begin terbinafine 250 mg daily, anticipate a 3-month course but return for lab studies after 30 days and again after 60 days Return for lab appointment followed by annual physical exam appointment in November

## 2021-06-08 NOTE — PROGRESS NOTES
Justina Tong presents today for   Chief Complaint   Patient presents with    Other     toenail questions on both feet       Is someone accompanying this pt? no    Is the patient using any DME equipment during OV? no    Depression Screening:  3 most recent PHQ Screens 6/8/2021   PHQ Not Done Patient Decline   Little interest or pleasure in doing things Not at all   Feeling down, depressed, irritable, or hopeless Not at all   Total Score PHQ 2 0       Learning Assessment:  Learning Assessment 11/26/2014   PRIMARY LEARNER Patient   HIGHEST LEVEL OF EDUCATION - PRIMARY LEARNER  -   BARRIERS PRIMARY LEARNER -   PRIMARY LANGUAGE ENGLISH   LEARNER PREFERENCE PRIMARY READING   ANSWERED BY patient   RELATIONSHIP SELF       Travel Screening:    Travel Screening     Question   Response    In the last month, have you been in contact with someone who was confirmed or suspected to have [place] [place] / COVID-19? No / Unsure    Have you had a COVID-19 viral test in the last 14 days? No    Do you have any of the following new or worsening symptoms? None of these    Have you traveled internationally or domestically in the last month? No      Travel History   Travel since 05/08/21     No documented travel since 05/08/21          Health Maintenance reviewed and discussed and ordered per Provider. Health Maintenance Due   Topic Date Due    COVID-19 Vaccine (1) Never done   . Coordination of Care:  1. Have you been to the ER, urgent care clinic since your last visit? Hospitalized since your last visit? no    2. Have you seen or consulted any other health care providers outside of the [address] [place] [address] since your last visit? Include any pap smears or colon screening.  no

## 2021-07-06 ENCOUNTER — APPOINTMENT (OUTPATIENT)
Dept: FAMILY MEDICINE CLINIC | Age: 61
End: 2021-07-06

## 2021-07-06 ENCOUNTER — HOSPITAL ENCOUNTER (OUTPATIENT)
Dept: LAB | Age: 61
Discharge: HOME | End: 2021-07-06
Payer: OTHER GOVERNMENT

## 2021-07-06 DIAGNOSIS — Z79.899 LONG-TERM CURRENT USE OF HIGH RISK MEDICATION OTHER THAN ANTICOAGULANT: ICD-10-CM

## 2021-07-06 LAB
ALT SERPL-CCNC: 54 U/L (ref 16–61)
AST SERPL-CCNC: 43 U/L (ref 10–38)

## 2021-07-06 PROCEDURE — 36415 COLL VENOUS BLD VENIPUNCTURE: CPT

## 2021-07-06 PROCEDURE — 84460 ALANINE AMINO (ALT) (SGPT): CPT

## 2021-07-06 PROCEDURE — 84450 TRANSFERASE (AST) (SGOT): CPT

## 2021-07-07 DIAGNOSIS — B35.1 ONYCHOMYCOSIS OF TOENAIL: Primary | ICD-10-CM

## 2021-07-07 NOTE — PROGRESS NOTES
Please advise that lab results show a very mild elevation of 1 liver enzyme. Based on that information I would recommend he stop taking terbinafine. If he would like I can refer him to see a podiatrist about alternative treatments for his suspected toenail fungus.

## 2021-11-16 DIAGNOSIS — E55.9 VITAMIN D DEFICIENCY: ICD-10-CM

## 2021-11-16 RX ORDER — ERGOCALCIFEROL 1.25 MG/1
CAPSULE ORAL
Qty: 12 CAPSULE | Refills: 4 | Status: SHIPPED | OUTPATIENT
Start: 2021-11-16

## 2021-12-13 ENCOUNTER — OFFICE VISIT (OUTPATIENT)
Dept: FAMILY MEDICINE CLINIC | Age: 61
End: 2021-12-13
Payer: OTHER GOVERNMENT

## 2021-12-13 VITALS
HEIGHT: 69 IN | TEMPERATURE: 98.1 F | BODY MASS INDEX: 35.55 KG/M2 | RESPIRATION RATE: 16 BRPM | OXYGEN SATURATION: 96 % | SYSTOLIC BLOOD PRESSURE: 130 MMHG | DIASTOLIC BLOOD PRESSURE: 70 MMHG | WEIGHT: 240 LBS | HEART RATE: 53 BPM

## 2021-12-13 DIAGNOSIS — Z00.00 ROUTINE GENERAL MEDICAL EXAMINATION AT A HEALTH CARE FACILITY: Primary | ICD-10-CM

## 2021-12-13 DIAGNOSIS — E11.9 TYPE 2 DIABETES MELLITUS WITHOUT COMPLICATION, WITHOUT LONG-TERM CURRENT USE OF INSULIN (HCC): ICD-10-CM

## 2021-12-13 DIAGNOSIS — K21.9 GASTROESOPHAGEAL REFLUX DISEASE, UNSPECIFIED WHETHER ESOPHAGITIS PRESENT: ICD-10-CM

## 2021-12-13 DIAGNOSIS — E55.9 VITAMIN D DEFICIENCY: ICD-10-CM

## 2021-12-13 DIAGNOSIS — E66.01 SEVERE OBESITY (HCC): ICD-10-CM

## 2021-12-13 DIAGNOSIS — M25.561 RIGHT KNEE PAIN, UNSPECIFIED CHRONICITY: ICD-10-CM

## 2021-12-13 LAB — HBA1C MFR BLD HPLC: 7 %

## 2021-12-13 PROCEDURE — 83036 HEMOGLOBIN GLYCOSYLATED A1C: CPT | Performed by: FAMILY MEDICINE

## 2021-12-13 PROCEDURE — 3051F HG A1C>EQUAL 7.0%<8.0%: CPT | Performed by: FAMILY MEDICINE

## 2021-12-13 PROCEDURE — 99396 PREV VISIT EST AGE 40-64: CPT | Performed by: FAMILY MEDICINE

## 2021-12-13 RX ORDER — OMEPRAZOLE 20 MG/1
CAPSULE, DELAYED RELEASE ORAL
Qty: 90 CAPSULE | Refills: 3 | Status: SHIPPED | OUTPATIENT
Start: 2021-12-13

## 2021-12-13 NOTE — PATIENT INSTRUCTIONS
Follow patellar tracking disorder exercise instructions, avoid unnecessary uncomfortable activity  Follow-up for new symptoms, worsening symptoms or failure to improve  Lab studies ordered, further disposition pending lab results if indicated  Avoid dietary starch and sugar and follow program of regular aerobic exercise  Return for annual physical exam and follow-up in 1 year or sooner with any problems

## 2021-12-13 NOTE — PROGRESS NOTES
HISTORY OF PRESENT ILLNESS  Carly Perkins is a 63 y.o. male. He presents reporting right knee pain and for health assessment, preventative care and follow-up with a history of prediabetes, GERD, vitamin D deficiency and obesity    Mr#: [id number]      Past Medical History:   Diagnosis Date    Bulging lumbar disc June 2014    L5/S1 (seee MRI June 2014); Small non-impinging left posterior disc protrusion L5/S1,    Duodenitis 05/09/2019    Noted on EGD 5/9/19 - Dr. Artie Cortés GERD (gastroesophageal reflux disease)     Uses PRN OTC Prilosec, doing well with low acid foods    Low back pain 5/29/2014    Prediabetes     HbA1C 5.9% 12/15    Right-sided low back pain with right-sided sciatica 07/01/2015    Anticipating surgery 5/30/17 stenosis S1, DDD  L4-S1 with fusion of L5 & S1    Sleep apnea     Has CPAP    Spinal stenosis June 2014    Lumbar spine MRI with mild degenerative spondylosis without high-grade stenosis.     Vitamin D deficiency 2/17/2018 2/14/18:  Vit D 15 --> start OTC Vit D3 5000 units       Past Surgical History:   Procedure Laterality Date    HX ACL RECONSTRUCTION  2004    HX ENDOSCOPY N/A 05/09/2019    Dr. Glenn Gracia, [facility], [place], report in [address] [place] [address],Third Floor HX [facility]  05/30/2017    [facility] - Dr. Ana M Irizarry        Family History   Problem Relation Age of Onset    Diabetes Maternal Grandfather        Allergies   Allergen Reactions    Lyrica [Pregabalin] Other (comments)     Weight gain       Social History     Tobacco Use   Smoking Status Never Smoker   Smokeless Tobacco Never Used   Tobacco Comment    \"light smoking at 20 to look cool at the bars to  women\"       Social History     Substance and Sexual Activity   Alcohol Use Yes    Comment: 1/mo           Patient Active Problem List   Diagnosis Code    Sleep apnea G47.30    GERD (gastroesophageal reflux disease) K21.9    Prediabetes R73.03    Right-sided low back pain with right-sided sciatica M54.41  Spinal stenosis M48.00    Vitamin D deficiency E55.9    Severe obesity (HCC) E66.01    Skin tag L91.8    Skin growth D49.2         Current Outpatient Medications:     ergocalciferol (ERGOCALCIFEROL) 1,250 mcg (50,000 unit) capsule, TAKE 1 CAPSULE BY MOUTH EVERY 7 DAYS, Disp: 12 Capsule, Rfl: 4    cholecalciferol (VITAMIN D3) (2,000 UNITS /50 MCG) cap capsule, Take 2 capsules daily, Disp: 180 Cap, Rfl: 3    omeprazole (PRILOSEC) 20 mg capsule, TAKE 1 TABLET BY MOUTH DAILY. , Disp: 90 Cap, Rfl: 3    cpap machine kit, by Does Not Apply route. G47.33 - Update CPAP set @ Auto, 10-15cm H20, heated humidifier, tubing, climate line, filters (6), chinstrap, integrated heated humidifier. Authorized for 2nd mask and headgear within one year if needed, Disp: 1 Kit, Rfl: 0       Review of Systems   Constitutional: Negative for chills, fever and weight loss. HENT: Negative for congestion, ear pain, hearing loss and sore throat. Eyes: Negative for blurred vision and double vision. Respiratory: Positive for cough (chronic). Negative for shortness of breath and wheezing. Cardiovascular: Negative for chest pain, palpitations and leg swelling. Gastrointestinal: Positive for heartburn. Negative for abdominal pain, blood in stool, constipation, diarrhea, melena, nausea and vomiting. Genitourinary: Negative for dysuria and urgency. Musculoskeletal: Positive for joint pain ( Right knee). Negative for myalgias. Skin: Negative for itching and rash. Neurological: Negative for dizziness, tingling, sensory change, focal weakness and headaches. Endo/Heme/Allergies: Negative for environmental allergies. Psychiatric/Behavioral: Negative for depression. The patient is not nervous/anxious and does not have insomnia.       Visit Vitals  /70   Pulse (!) 53   Temp 98.1 °F (36.7 °C) (Temporal)   Resp 16   Ht 5' 9\" (1.753 m)   Wt 240 lb (108.9 kg)   SpO2 96%   BMI 35.44 kg/m²       Physical Exam  Vitals and nursing note reviewed. Constitutional:       General: He is not in acute distress. Appearance: Normal appearance. He is obese. He is not ill-appearing. HENT:      Head: Normocephalic. Right Ear: Tympanic membrane, ear canal and external ear normal.      Left Ear: Tympanic membrane, ear canal and external ear normal.   Eyes:      Extraocular Movements: Extraocular movements intact. Conjunctiva/sclera: Conjunctivae normal.      Pupils: Pupils are equal, round, and reactive to light. Neck:      Vascular: No carotid bruit. Cardiovascular:      Rate and Rhythm: Normal rate and regular rhythm. Heart sounds: Normal heart sounds. Pulmonary:      Effort: Pulmonary effort is normal.      Breath sounds: Normal breath sounds. Abdominal:      Palpations: Abdomen is soft. Tenderness: There is no abdominal tenderness. Musculoskeletal:         General: No deformity. Cervical back: Neck supple. Right lower leg: No edema. Left lower leg: No edema. Skin:     General: Skin is warm and dry. Neurological:      General: No focal deficit present. Mental Status: He is alert and oriented to person, place, and time. Psychiatric:         Mood and Affect: Mood normal.         Behavior: Behavior normal.       Results for Payam Ospina (MRN [id number]) as of 12/13/2021 18:09   Ref. Range 12/13/2021 15:33   Hemoglobin A1c (POC) Latest Units: % 7.0     ASSESSMENT and PLAN    ICD-10-CM ICD-9-CM    1. Routine general medical examination at a health care facility  Z00.00 V70.0 CBC WITH AUTOMATED DIFF      URINALYSIS W/ RFLX MICROSCOPIC      TSH 3RD GENERATION      MICROALBUMIN, UR, RAND W/ MICROALB/CREAT RATIO      METABOLIC PANEL, COMPREHENSIVE      LIPID PANEL      PSA SCREENING (SCREENING)      VITAMIN D, 25 HYDROXY   2. Right knee pain, unspecified chronicity  M25.561 719.46    3.  Type 2 diabetes mellitus without complication, without long-term current use of insulin (HCC)  E11.9 250.00 AMB POC HEMOGLOBIN A1C      MICROALBUMIN, UR, RAND W/ MICROALB/CREAT RATIO   4. Gastroesophageal reflux disease, unspecified whether esophagitis present  K21.9 530.81 omeprazole (PRILOSEC) 20 mg capsule   5. Vitamin D deficiency  E55.9 268.9 VITAMIN D, 25 HYDROXY   6. 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  Prediabetes has progressed to type 2 diabetes with a hemoglobin A1c of 7.0%  GERD controlled with omeprazole  Vitamin D deficiency status to be determined pending lab results    Health maintenance recommendations:  COVID-19 immunization booster    Plan:  Follow patellar tracking disorder exercise instructions, avoid unnecessary uncomfortable activity  Follow-up for new symptoms, worsening symptoms or failure to improve  Lab studies ordered, further disposition pending lab results if indicated  With plan start on Metformin  daily, atorvastatin and lisinopril pending the remaining lab results  Avoid dietary starch and sugar and follow program of regular aerobic exercise  Return for follow-up in 3 months with A1c and possibly lipid panel pending lab results from studies ordered today    Return for annual physical exam and follow-up in 1 year or sooner with any problems    Elie Pelaez MD      PLEASE NOTE:   This document has been produced using voice recognition software. Unrecognized errors in transcription may be present.

## 2021-12-15 ENCOUNTER — HOSPITAL ENCOUNTER (OUTPATIENT)
Dept: LAB | Age: 61
Discharge: HOME | End: 2021-12-15
Payer: OTHER GOVERNMENT

## 2021-12-15 ENCOUNTER — APPOINTMENT (OUTPATIENT)
Dept: FAMILY MEDICINE CLINIC | Age: 61
End: 2021-12-15

## 2021-12-15 DIAGNOSIS — Z00.00 ROUTINE GENERAL MEDICAL EXAMINATION AT A HEALTH CARE FACILITY: ICD-10-CM

## 2021-12-15 DIAGNOSIS — E55.9 VITAMIN D DEFICIENCY: ICD-10-CM

## 2021-12-15 DIAGNOSIS — E11.9 TYPE 2 DIABETES MELLITUS WITHOUT COMPLICATION, WITHOUT LONG-TERM CURRENT USE OF INSULIN (HCC): ICD-10-CM

## 2021-12-15 LAB
25(OH)D3 SERPL-MCNC: 55.8 NG/ML (ref 30–100)
ALBUMIN SERPL-MCNC: 4 G/DL (ref 3.4–5)
ALBUMIN/GLOB SERPL: 1.4 {RATIO} (ref 0.8–1.7)
ALP SERPL-CCNC: 90 U/L (ref 45–117)
ALT SERPL-CCNC: 47 U/L (ref 16–61)
ANION GAP SERPL CALC-SCNC: 4 MMOL/L (ref 3–18)
APPEARANCE UR: NORMAL
AST SERPL-CCNC: 22 U/L (ref 10–38)
BASOPHILS # BLD: 0.1 K/UL (ref 0–0.1)
BASOPHILS NFR BLD: 1 % (ref 0–2)
BILIRUB SERPL-MCNC: 0.5 MG/DL (ref 0.2–1)
BILIRUB UR QL: NEGATIVE
BUN SERPL-MCNC: 13 MG/DL (ref 7–18)
BUN/CREAT SERPL: 12 (ref 12–20)
CALCIUM SERPL-MCNC: 8.9 MG/DL (ref 8.5–10.1)
CHLORIDE SERPL-SCNC: 106 MMOL/L (ref 100–111)
CHOLEST SERPL-MCNC: 135 MG/DL
CO2 SERPL-SCNC: 31 MMOL/L (ref 21–32)
COLOR UR: YELLOW
CREAT SERPL-MCNC: 1.13 MG/DL (ref 0.6–1.3)
CREAT UR-MCNC: 210 MG/DL (ref 30–125)
DIFFERENTIAL METHOD BLD: ABNORMAL
EOSINOPHIL # BLD: 0.1 K/UL (ref 0–0.4)
EOSINOPHIL NFR BLD: 2 % (ref 0–5)
ERYTHROCYTE [DISTWIDTH] IN BLOOD BY AUTOMATED COUNT: 12 % (ref 11.6–14.5)
GLOBULIN SER CALC-MCNC: 2.9 G/DL (ref 2–4)
GLUCOSE SERPL-MCNC: 128 MG/DL (ref 74–99)
GLUCOSE UR STRIP.AUTO-MCNC: NEGATIVE MG/DL
HCT VFR BLD AUTO: 49.9 % (ref 36–48)
HDLC SERPL-MCNC: 29 MG/DL (ref 40–60)
HDLC SERPL: 4.7 {RATIO} (ref 0–5)
HGB BLD-MCNC: 16.1 G/DL (ref 13–16)
HGB UR QL STRIP: NEGATIVE
IMM GRANULOCYTES # BLD AUTO: 0 K/UL (ref 0–0.04)
IMM GRANULOCYTES NFR BLD AUTO: 0 % (ref 0–0.5)
KETONES UR QL STRIP.AUTO: NEGATIVE MG/DL
LDLC SERPL CALC-MCNC: 69.4 MG/DL (ref 0–100)
LEUKOCYTE ESTERASE UR QL STRIP.AUTO: NEGATIVE
LIPID PROFILE,FLP: ABNORMAL
LYMPHOCYTES # BLD: 1.9 K/UL (ref 0.9–3.6)
LYMPHOCYTES NFR BLD: 30 % (ref 21–52)
MCH RBC QN AUTO: 30.5 PG (ref 24–34)
MCHC RBC AUTO-ENTMCNC: 32.3 G/DL (ref 31–37)
MCV RBC AUTO: 94.5 FL (ref 78–100)
MICROALBUMIN UR-MCNC: 0.76 MG/DL (ref 0–3)
MICROALBUMIN/CREAT UR-RTO: 4 MG/G (ref 0–30)
MONOCYTES # BLD: 0.7 K/UL (ref 0.05–1.2)
MONOCYTES NFR BLD: 11 % (ref 3–10)
NEUTS SEG # BLD: 3.4 K/UL (ref 1.8–8)
NEUTS SEG NFR BLD: 55 % (ref 40–73)
NITRITE UR QL STRIP.AUTO: NEGATIVE
NRBC # BLD: 0 K/UL (ref 0–0.01)
NRBC BLD-RTO: 0 PER 100 WBC
PH UR STRIP: 5.5 [PH] (ref 5–8)
PLATELET # BLD AUTO: 179 K/UL (ref 135–420)
PMV BLD AUTO: 11.6 FL (ref 9.2–11.8)
POTASSIUM SERPL-SCNC: 4.1 MMOL/L (ref 3.5–5.5)
PROT SERPL-MCNC: 6.9 G/DL (ref 6.4–8.2)
PROT UR STRIP-MCNC: NEGATIVE MG/DL
PSA SERPL-MCNC: 1.9 NG/ML (ref 0–4)
RBC # BLD AUTO: 5.28 M/UL (ref 4.35–5.65)
SODIUM SERPL-SCNC: 141 MMOL/L (ref 136–145)
SP GR UR REFRACTOMETRY: 1.02 (ref 1–1.03)
TRIGL SERPL-MCNC: 183 MG/DL (ref ?–150)
TSH SERPL DL<=0.05 MIU/L-ACNC: 1.33 UIU/ML (ref 0.36–3.74)
UROBILINOGEN UR QL STRIP.AUTO: 0.2 EU/DL (ref 0.2–1)
VLDLC SERPL CALC-MCNC: 36.6 MG/DL
WBC # BLD AUTO: 6.2 K/UL (ref 4.6–13.2)

## 2021-12-15 PROCEDURE — 80053 COMPREHEN METABOLIC PANEL: CPT

## 2021-12-15 PROCEDURE — 36415 COLL VENOUS BLD VENIPUNCTURE: CPT

## 2021-12-15 PROCEDURE — 85025 COMPLETE CBC W/AUTO DIFF WBC: CPT

## 2021-12-15 PROCEDURE — 82043 UR ALBUMIN QUANTITATIVE: CPT

## 2021-12-15 PROCEDURE — 82306 VITAMIN D 25 HYDROXY: CPT

## 2021-12-15 PROCEDURE — 84153 ASSAY OF PSA TOTAL: CPT

## 2021-12-15 PROCEDURE — 84443 ASSAY THYROID STIM HORMONE: CPT

## 2021-12-15 PROCEDURE — 81003 URINALYSIS AUTO W/O SCOPE: CPT

## 2021-12-15 PROCEDURE — 80061 LIPID PANEL: CPT

## 2021-12-18 NOTE — PROGRESS NOTES
Please advise that lab results are satisfactory. The HDL cholesterol which is the good cholesterol has gotten a little lower. It would be nice if this could come up some but is difficult to move. About the only thing that works reliably is regular aerobic exercise. Taking some over-the-counter fish oil might help as well.

## 2022-02-17 ENCOUNTER — OFFICE VISIT (OUTPATIENT)
Dept: FAMILY MEDICINE CLINIC | Age: 62
End: 2022-02-17
Payer: OTHER GOVERNMENT

## 2022-02-17 VITALS
HEART RATE: 56 BPM | WEIGHT: 242 LBS | RESPIRATION RATE: 16 BRPM | OXYGEN SATURATION: 95 % | HEIGHT: 69 IN | TEMPERATURE: 97.6 F | SYSTOLIC BLOOD PRESSURE: 120 MMHG | DIASTOLIC BLOOD PRESSURE: 80 MMHG | BODY MASS INDEX: 35.84 KG/M2

## 2022-02-17 DIAGNOSIS — E11.9 TYPE 2 DIABETES MELLITUS WITHOUT COMPLICATION, WITHOUT LONG-TERM CURRENT USE OF INSULIN (HCC): Primary | ICD-10-CM

## 2022-02-17 DIAGNOSIS — R05.3 CHRONIC COUGH: ICD-10-CM

## 2022-02-17 DIAGNOSIS — K21.9 GASTROESOPHAGEAL REFLUX DISEASE, UNSPECIFIED WHETHER ESOPHAGITIS PRESENT: ICD-10-CM

## 2022-02-17 DIAGNOSIS — L91.8 CUTANEOUS SKIN TAGS: ICD-10-CM

## 2022-02-17 DIAGNOSIS — Z23 ENCOUNTER FOR IMMUNIZATION: ICD-10-CM

## 2022-02-17 LAB — HBA1C MFR BLD HPLC: 7.3 %

## 2022-02-17 PROCEDURE — 99214 OFFICE O/P EST MOD 30 MIN: CPT | Performed by: FAMILY MEDICINE

## 2022-02-17 PROCEDURE — 90732 PPSV23 VACC 2 YRS+ SUBQ/IM: CPT | Performed by: FAMILY MEDICINE

## 2022-02-17 PROCEDURE — 3051F HG A1C>EQUAL 7.0%<8.0%: CPT | Performed by: FAMILY MEDICINE

## 2022-02-17 PROCEDURE — 83036 HEMOGLOBIN GLYCOSYLATED A1C: CPT | Performed by: FAMILY MEDICINE

## 2022-02-17 PROCEDURE — 90471 IMMUNIZATION ADMIN: CPT | Performed by: FAMILY MEDICINE

## 2022-02-17 RX ORDER — METFORMIN HYDROCHLORIDE 500 MG/1
1000 TABLET, EXTENDED RELEASE ORAL
Qty: 30 TABLET | Refills: 1 | Status: SHIPPED | OUTPATIENT
Start: 2022-02-17 | End: 2022-05-26

## 2022-02-17 NOTE — PATIENT INSTRUCTIONS
Assessment:  Type 2 diabetes with worsening A1c levels  Chronic cough unimproved  Reflux symptoms adequately controlled with current treatment  Irritating skin tags      Health maintenance recommendations:  PPSV23 immunization today  Diabetic eye exam    Plan:  Begin Metformin  mg, 2 tablets daily with evening meal  Referral for pulmonary medicine evaluation  Referral for dermatology evaluation  Continue to avoid dietary starch and follow program of regular aerobic exercise  Continue current medications  Return for follow-up with in office hemoglobin A1c in June or sooner with any problems

## 2022-02-17 NOTE — PROGRESS NOTES
Gayle Fink is a 63 y.o. male (: 1960) presenting to address:    Chief Complaint   Patient presents with    Cough     dry cough for 5 years.  Skin Problem     skin tag. Vitals:    22 0926   BP: 120/80   Pulse: (!) 56   Resp: 16   Temp: 97.6 °F (36.4 °C)   TempSrc: Temporal   SpO2: 95%   Weight: 242 lb (109.8 kg)   Height: 5' 9\" (1.753 m)   PainSc:   0 - No pain       Hearing/Vision:   No exam data present    Learning Assessment:     Learning Assessment 2014   PRIMARY LEARNER Patient   HIGHEST LEVEL OF EDUCATION - PRIMARY LEARNER  -   BARRIERS PRIMARY LEARNER -   PRIMARY LANGUAGE ENGLISH   LEARNER PREFERENCE PRIMARY READING   ANSWERED BY patient   RELATIONSHIP SELF     Depression Screening:     3 most recent PHQ Screens 2021   PHQ Not Done -   Little interest or pleasure in doing things Not at all   Feeling down, depressed, irritable, or hopeless Not at all   Total Score PHQ 2 0     Fall Risk Assessment:     Fall Risk Assessment, last 12 mths 2019   Able to walk? Yes   Fall in past 12 months? No     Abuse Screening:     Abuse Screening Questionnaire 2019   Do you ever feel afraid of your partner? N   Are you in a relationship with someone who physically or mentally threatens you? N   Is it safe for you to go home? Y     ADL Assessment:   No flowsheet data found. Coordination of Care Questionaire:   1. \"Have you been to the ER, urgent care clinic since your last visit? Hospitalized since your last visit? \" No    2. \"Have you seen or consulted any other health care providers outside of the [address] [place] [address] since your last visit? \" No     3. For patients aged 39-70: Has the patient had a colonoscopy? Yes - no Care Gap present         Advanced Directive:   1. Do you have an Advanced Directive? NO    2. Would you like information on Advanced Directives? NO  Pneumovax 23 Immunization/s administered 2022 by Rupal Monroe LPN with guardian's consent. Patient tolerated procedure well. No reactions noted.

## 2022-02-17 NOTE — PROGRESS NOTES
HISTORY OF PRESENT ILLNESS  Felicia Kohler is a 63 y.o. male. He presents for follow-up with a history of type 2 diabetes diagnosed 2 months ago with plans to start Metformin which apparently did not occur and with a long history of chronic cough (greater than 5 years) previously evaluated otolaryngologist who recommended increasing resolve twice daily out of concern that this was associated with reflux but without improvement. Mr#: [id number]      Past Medical History:   Diagnosis Date    Bulging lumbar disc June 2014    L5/S1 (seee MRI June 2014); Small non-impinging left posterior disc protrusion L5/S1,    Duodenitis 05/09/2019    Noted on EGD 5/9/19 - Dr. Tlaia Guerrero GERD (gastroesophageal reflux disease)     Uses PRN OTC Prilosec, doing well with low acid foods    Low back pain 5/29/2014    Prediabetes     HbA1C 5.9% 12/15    Right-sided low back pain with right-sided sciatica 07/01/2015    Anticipating surgery 5/30/17 stenosis S1, DDD  L4-S1 with fusion of L5 & S1    Sleep apnea     Has CPAP    Spinal stenosis June 2014    Lumbar spine MRI with mild degenerative spondylosis without high-grade stenosis.     Vitamin D deficiency 2/17/2018 2/14/18:  Vit D 15 --> start OTC Vit D3 5000 units       Past Surgical History:   Procedure Laterality Date    HX ACL RECONSTRUCTION  2004    HX ENDOSCOPY N/A 05/09/2019    Dr. Kanu Bishop, eval dysphagia, [place], report in [facility]  05/30/2017    [facility] - Dr. Lobito Alba        Family History   Problem Relation Age of Onset    Diabetes Maternal Grandfather        Allergies   Allergen Reactions    Lyrica [Pregabalin] Other (comments)     Weight gain       Social History     Tobacco Use   Smoking Status Never Smoker   Smokeless Tobacco Never Used   Tobacco Comment    \"light smoking at 20 to look cool at the bars to  women\"       Social History     Substance and Sexual Activity   Alcohol Use Yes    Comment: 1/mo Patient Active Problem List   Diagnosis Code    Sleep apnea G47.30    GERD (gastroesophageal reflux disease) K21.9    Prediabetes R73.03    Right-sided low back pain with right-sided sciatica M54.41    Spinal stenosis M48.00    Vitamin D deficiency E55.9    Severe obesity (HCC) E66.01    Skin tag L91.8    Skin growth D49.2         Current Outpatient Medications:     omeprazole (PRILOSEC) 20 mg capsule, TAKE 1 TABLET BY MOUTH DAILY. , Disp: 90 Capsule, Rfl: 3    ergocalciferol (ERGOCALCIFEROL) 1,250 mcg (50,000 unit) capsule, TAKE 1 CAPSULE BY MOUTH EVERY 7 DAYS, Disp: 12 Capsule, Rfl: 4    cholecalciferol (VITAMIN D3) (2,000 UNITS /50 MCG) cap capsule, Take 2 capsules daily, Disp: 180 Cap, Rfl: 3    cpap machine kit, by Does Not Apply route. G47.33 - Update CPAP set @ Auto, 10-15cm H20, heated humidifier, tubing, climate line, filters (6), chinstrap, integrated heated humidifier. Authorized for 2nd mask and headgear within one year if needed, Disp: 1 Kit, Rfl: 0       Review of Systems   Constitutional: Negative for fever and weight loss. Eyes: Negative for blurred vision. Respiratory: Positive for cough. Negative for hemoptysis, sputum production, shortness of breath and wheezing. Cardiovascular: Negative for chest pain, palpitations and orthopnea. Genitourinary: Negative for frequency. Skin:        Multiple skin tags which cause discomfort, irritation against underwear   Neurological: Negative for tingling. Endo/Heme/Allergies: Negative for polydipsia. Visit Vitals  /80   Pulse (!) 56   Temp 97.6 °F (36.4 °C) (Temporal)   Resp 16   Ht 5' 9\" (1.753 m)   Wt 242 lb (109.8 kg)   SpO2 95%   BMI 35.74 kg/m²       Physical Exam  Vitals and nursing note reviewed. Constitutional:       General: He is not in acute distress. Appearance: Normal appearance. He is well-developed. He is obese. He is not ill-appearing. HENT:      Head: Normocephalic.    Eyes:      Extraocular Movements: Extraocular movements intact. Cardiovascular:      Rate and Rhythm: Normal rate and regular rhythm. Heart sounds: Normal heart sounds. Pulmonary:      Effort: Pulmonary effort is normal.      Comments: Mildly coarse expiratory breath sounds  Musculoskeletal:      Cervical back: Neck supple. Skin:     General: Skin is warm and dry. Neurological:      Mental Status: He is alert and oriented to person, place, and time. Psychiatric:         Mood and Affect: Mood normal.         Behavior: Behavior normal.         Thought Content: Thought content normal.         ASSESSMENT and PLAN    ICD-10-CM ICD-9-CM    1. Type 2 diabetes mellitus without complication, without long-term current use of insulin (HCC)  E11.9 250.00 AMB POC HEMOGLOBIN A1C      metFORMIN ER (GLUCOPHAGE XR) 500 mg tablet   2. Chronic cough  R05.3 786.2 REFERRAL TO PULMONARY DISEASE   3. Gastroesophageal reflux disease, unspecified whether esophagitis present  K21.9 530.81    4. Encounter for immunization  Z23 V03.89 PNEUMOCOCCAL POLYSACCHARIDE VACCINE, 23-VALENT, ADULT OR IMMUNOSUPPRESSED PT DOSE,   5. Cutaneous skin tags  L91.8 701.9 REFERRAL TO DERMATOLOGY   Assessment:  Type 2 diabetes with worsening A1c levels  Chronic cough unimproved  Reflux symptoms adequately controlled with current treatment  Irritating skin tags      Health maintenance recommendations:  PPSV23 immunization today  Diabetic eye exam    Plan:  Begin Metformin  mg, 2 tablets daily with evening meal  Referral for pulmonary medicine evaluation  Referral for dermatology evaluation  Continue to avoid dietary starch and follow program of regular aerobic exercise  Continue current medications  Return for follow-up with in office hemoglobin A1c in June or sooner with any problems    Laila Sykes MD      PLEASE NOTE:   This document has been produced using voice recognition software. Unrecognized errors in transcription may be present.

## 2022-03-18 PROBLEM — E55.9 VITAMIN D DEFICIENCY: Status: ACTIVE | Noted: 2018-02-17

## 2022-03-19 PROBLEM — D49.2 SKIN GROWTH: Status: ACTIVE | Noted: 2019-06-05

## 2022-03-19 PROBLEM — E66.01 SEVERE OBESITY (HCC): Status: ACTIVE | Noted: 2019-01-15

## 2022-03-20 PROBLEM — L91.8 SKIN TAG: Status: ACTIVE | Noted: 2019-06-05

## 2022-05-25 DIAGNOSIS — E11.9 TYPE 2 DIABETES MELLITUS WITHOUT COMPLICATION, WITHOUT LONG-TERM CURRENT USE OF INSULIN (HCC): ICD-10-CM

## 2022-05-26 RX ORDER — METFORMIN HYDROCHLORIDE 500 MG/1
TABLET, EXTENDED RELEASE ORAL
Qty: 180 TABLET | Refills: 2 | Status: SHIPPED | OUTPATIENT
Start: 2022-05-26

## 2022-06-30 NOTE — PROGRESS NOTES
HISTORY OF PRESENT ILLNESS  Annabelle Awad is a 63 y.o. male. He returns for follow-up with a history of recently diagnosed type 2 diabetes, chronic cough, GERD, severe obesity and bothersome cutaneous skin tags most recently evaluated in February resulting in the following assessment and plan:    Assessment:  Type 2 diabetes with worsening A1c levels  Chronic cough unimproved  Reflux symptoms adequately controlled with current treatment  Irritating skin tags        Health maintenance recommendations:  PPSV23 immunization today  Diabetic eye exam     Plan:  Begin Metformin  mg, 2 tablets daily with evening meal  Referral for pulmonary medicine evaluation  Referral for dermatology evaluation  Continue to avoid dietary starch and follow program of regular aerobic exercise  Continue current medications  Return for follow-up with in office hemoglobin A1c in June or sooner with any problems    His pulmonary medicine consultation resulted in a chest CT which was unremarkable for any findings to explain his chronic cough. Today he reports that he has been working on dietary compliance and has been compliant with medication. He notes that pulmonary function studies have been ordered and he anticipates starting a trial of Advair for the chronic cough. He has been for dermatology evaluation and some of the skin tags have been removed with plans to remove others at a later date. Mr#: [id number]      Past Medical History:   Diagnosis Date    Bulging lumbar disc June 2014    L5/S1 (seee MRI June 2014);  Small non-impinging left posterior disc protrusion L5/S1,    Duodenitis 05/09/2019    Noted on EGD 5/9/19 - Dr. Mitchell Hardy GERD (gastroesophageal reflux disease)     Uses PRN OTC Prilosec, doing well with low acid foods    Low back pain 5/29/2014    Prediabetes     HbA1C 5.9% 12/15    Right-sided low back pain with right-sided sciatica 07/01/2015    Anticipating surgery 5/30/17 stenosis S1, DDD  L4-S1 with fusion of L5 & S1    Sleep apnea     Has CPAP    Spinal stenosis June 2014    Lumbar spine MRI with mild degenerative spondylosis without high-grade stenosis.  Vitamin D deficiency 2/17/2018 2/14/18:  Vit D 15 --> start OTC Vit D3 5000 units       Past Surgical History:   Procedure Laterality Date    HX ACL RECONSTRUCTION  2004    HX ENDOSCOPY N/A 05/09/2019    Dr. Cassidy Ferrari, eval dysphagia, [place], report in [address] [place] [address],Third Floor HX [address]  05/30/2017    [facility] - Dr. Daniella To        Family History   Problem Relation Age of Onset    Diabetes Maternal Grandfather        Allergies   Allergen Reactions    Lyrica [Pregabalin] Other (comments)     Weight gain       Social History     Tobacco Use   Smoking Status Never Smoker   Smokeless Tobacco Never Used   Tobacco Comment    \"light smoking at 20 to look cool at the bars to  women\"       Social History     Substance and Sexual Activity   Alcohol Use Yes    Comment: 1/mo           Patient Active Problem List   Diagnosis Code    Sleep apnea G47.30    GERD (gastroesophageal reflux disease) K21.9    Prediabetes R73.03    Right-sided low back pain with right-sided sciatica M54.41    Spinal stenosis M48.00    Vitamin D deficiency E55.9    Severe obesity ([address] E66.01    Skin tag L91.8    Skin growth D49.2         Current Outpatient Medications:     metFORMIN ER (GLUCOPHAGE XR) 500 mg tablet, TAKE 2 TABLETS BY MOUTH DAILY WITH DINNER, Disp: 180 Tablet, Rfl: 2    omeprazole (PRILOSEC) 20 mg capsule, TAKE 1 TABLET BY MOUTH DAILY. , Disp: 90 Capsule, Rfl: 3    ergocalciferol (ERGOCALCIFEROL) 1,250 mcg (50,000 unit) capsule, TAKE 1 CAPSULE BY MOUTH EVERY 7 DAYS, Disp: 12 Capsule, Rfl: 4    cholecalciferol (VITAMIN D3) (2,000 UNITS /50 MCG) cap capsule, Take 2 capsules daily, Disp: 180 Cap, Rfl: 3    cpap machine kit, by Does Not Apply route.  G47.33 - Update CPAP set @ Auto, 10-15cm H20, heated humidifier, tubing, climate line, filters (6), chinstrap, integrated heated humidifier. Authorized for 2nd mask and headgear within one year if needed, Disp: 1 Kit, Rfl: 0       Review of Systems   Constitutional: Negative for fever and weight loss. Eyes: Negative for blurred vision. Respiratory: Positive for cough. Genitourinary: Negative for frequency. Skin:        Skin tags   Neurological: Negative for tingling. Endo/Heme/Allergies: Negative for polydipsia. Visit Vitals  /80 (BP 1 Location: Left upper arm, BP Patient Position: Sitting, BP Cuff Size: Adult)   Pulse (!) 46   Temp 97.3 °F (36.3 °C) (Temporal)   Resp 18   Ht 5' 9\" (1.753 m)   Wt 235 lb (106.6 kg)   SpO2 96%   BMI 34.70 kg/m²       Physical Exam  Constitutional:       General: He is not in acute distress. Appearance: Normal appearance. He is obese. He is not ill-appearing. Eyes:      Extraocular Movements: Extraocular movements intact. Pulmonary:      Effort: Pulmonary effort is normal.   Neurological:      Mental Status: He is alert. Psychiatric:         Mood and Affect: Mood normal.            Diabetic foot exam performed by Martha Saldaña MD     Measurement  Response Nurse Comment Physician Comment   Monofilament  R - normal sensation with micro filament  L - normal sensation with micro filament     Pulse DP R - 2+ (normal)  L - 2+ (normal)     Pulse TP R - 2+ (normal)  L - 2+ (normal)     Structural deformity R - None  L - None     Skin Integrity / Deformity R - Mild -dystrophic second toenail  L - Mild -dystrophic second toenail                     Results for Evita Echols (MRN [id number]) as of 7/1/2022 08:58   Ref. Range 7/6/2021 10:59 12/13/2021 15:33 12/15/2021 07:15 2/17/2022 09:52 7/1/2022 08:42   LDL, calculated Latest Ref Range: 0 - 100 MG/DL   69.4     Hemoglobin A1c (POC) Latest Units: %  7.0  7.3 6.5     ASSESSMENT and PLAN    ICD-10-CM ICD-9-CM    1.  Type 2 diabetes mellitus without complication, without long-term current use of insulin ([facility] E11.9 250.00 AMB POC HEMOGLOBIN A1C      HM DIABETES FOOT EXAM   2. Chronic cough  R05.3 786.2    3. Cutaneous skin tags  L91.8 701.9    4. Severe obesity (HCC)  E66.01 278.01    Assessment:  Diabetes control dramatically improved  Chronic cough pulmonary evaluation underway  Cutaneous skin tags treatment underway  Obesity improving    Health maintenance recommendations:  COVID-19 immunization booster  Diabetic eye exam    Plan:  Continue current medications  Avoid dietary starch and sugar and follow program of regular aerobic exercise  Pulmonary medicine follow-up  Return for lab appointment followed by annual physical exam appointment after 12/15/2022    Arjun Vernon MD      PLEASE NOTE:   This document has been produced using voice recognition software. Unrecognized errors in transcription may be present.

## 2022-07-01 ENCOUNTER — OFFICE VISIT (OUTPATIENT)
Dept: FAMILY MEDICINE CLINIC | Age: 62
End: 2022-07-01

## 2022-07-01 VITALS
SYSTOLIC BLOOD PRESSURE: 114 MMHG | TEMPERATURE: 97.3 F | BODY MASS INDEX: 34.8 KG/M2 | OXYGEN SATURATION: 96 % | WEIGHT: 235 LBS | DIASTOLIC BLOOD PRESSURE: 80 MMHG | RESPIRATION RATE: 18 BRPM | HEART RATE: 46 BPM | HEIGHT: 69 IN

## 2022-07-01 DIAGNOSIS — R05.3 CHRONIC COUGH: ICD-10-CM

## 2022-07-01 DIAGNOSIS — E55.9 VITAMIN D DEFICIENCY: ICD-10-CM

## 2022-07-01 DIAGNOSIS — Z00.00 ROUTINE HEALTH MAINTENANCE: ICD-10-CM

## 2022-07-01 DIAGNOSIS — E11.9 TYPE 2 DIABETES MELLITUS WITHOUT COMPLICATION, WITHOUT LONG-TERM CURRENT USE OF INSULIN (HCC): Primary | ICD-10-CM

## 2022-07-01 DIAGNOSIS — L91.8 CUTANEOUS SKIN TAGS: ICD-10-CM

## 2022-07-01 DIAGNOSIS — E66.01 SEVERE OBESITY (HCC): ICD-10-CM

## 2022-07-01 LAB — HBA1C MFR BLD HPLC: 6.5 %

## 2022-07-01 PROCEDURE — 3044F HG A1C LEVEL LT 7.0%: CPT | Performed by: FAMILY MEDICINE

## 2022-07-01 PROCEDURE — 99213 OFFICE O/P EST LOW 20 MIN: CPT | Performed by: FAMILY MEDICINE

## 2022-07-01 PROCEDURE — 83036 HEMOGLOBIN GLYCOSYLATED A1C: CPT | Performed by: FAMILY MEDICINE

## 2022-07-01 NOTE — PROGRESS NOTES
Angelo Kerns is a 63 y.o. male (: 1960) presenting to address:    No chief complaint on file. Vitals:    22 0823   BP: 114/80   Pulse: (!) 46   Resp: 18   Temp: 97.3 °F (36.3 °C)   TempSrc: Temporal   SpO2: 96%   Weight: 235 lb (106.6 kg)   Height: 5' 9\" (1.753 m)   PainSc:   0 - No pain       Hearing/Vision:   No exam data present    Learning Assessment:     Learning Assessment 2014   PRIMARY LEARNER Patient   HIGHEST LEVEL OF EDUCATION - PRIMARY LEARNER  -   BARRIERS PRIMARY LEARNER -   PRIMARY LANGUAGE ENGLISH   LEARNER PREFERENCE PRIMARY READING   ANSWERED BY patient   RELATIONSHIP SELF     Depression Screening:     3 most recent PHQ Screens 2022   PHQ Not Done -   Little interest or pleasure in doing things Not at all   Feeling down, depressed, irritable, or hopeless Not at all   Total Score PHQ 2 0     Fall Risk Assessment:     Fall Risk Assessment, last 12 mths 2019   Able to walk? Yes   Fall in past 12 months? No     Abuse Screening:     Abuse Screening Questionnaire 2019   Do you ever feel afraid of your partner? N   Are you in a relationship with someone who physically or mentally threatens you? N   Is it safe for you to go home? Y     ADL Assessment:   No flowsheet data found. Coordination of Care Questionaire:   1. \"Have you been to the ER, urgent care clinic since your last visit? Hospitalized since your last visit? \" No    2. \"Have you seen or consulted any other health care providers outside of the [address] [place] [address] since your last visit? \" Yes When:  Where: Dr. Ya Albert  Pulmonary medicine    3. For patients aged 39-70: Has the patient had a colonoscopy? Yes - no Care Gap present     If the patient is female:    4. For patients aged 41-77: Has the patient had a mammogram within the past 2 years? NA - based on age    11. For patients aged 21-65: Has the patient had a pap smear? NA - based on age    Advanced Directive:   1.  Do you have an Advanced Directive? YES    2. Would you like information on Advanced Directives?  NO

## 2022-07-01 NOTE — PATIENT INSTRUCTIONS
Assessment:  Diabetes control dramatically improved  Chronic cough pulmonary evaluation underway  Cutaneous skin tags treatment underway  Obesity improving    Health maintenance recommendations:  COVID-19 immunization booster  Diabetic eye exam    Plan:  Continue current medications  Avoid dietary starch and sugar and follow program of regular aerobic exercise  Pulmonary medicine follow-up  Return for lab appointment followed by annual physical exam appointment after 12/15/2022

## 2022-12-18 PROBLEM — E66.9 OBESITY (BMI 30.0-34.9): Status: ACTIVE | Noted: 2022-12-18

## 2022-12-18 PROBLEM — E66.811 OBESITY (BMI 30.0-34.9): Status: ACTIVE | Noted: 2022-12-18

## 2022-12-18 PROBLEM — E11.9 TYPE 2 DIABETES MELLITUS WITHOUT COMPLICATION, WITHOUT LONG-TERM CURRENT USE OF INSULIN (HCC): Status: ACTIVE | Noted: 2022-12-18

## 2023-01-06 ENCOUNTER — HOSPITAL ENCOUNTER (OUTPATIENT)
Dept: LAB | Age: 63
End: 2023-01-06
Payer: OTHER GOVERNMENT

## 2023-01-06 ENCOUNTER — APPOINTMENT (OUTPATIENT)
Dept: FAMILY MEDICINE CLINIC | Age: 63
End: 2023-01-06

## 2023-01-06 DIAGNOSIS — E55.9 VITAMIN D DEFICIENCY: ICD-10-CM

## 2023-01-06 DIAGNOSIS — E11.9 TYPE 2 DIABETES MELLITUS WITHOUT COMPLICATION, WITHOUT LONG-TERM CURRENT USE OF INSULIN (HCC): ICD-10-CM

## 2023-01-06 DIAGNOSIS — Z00.00 ROUTINE HEALTH MAINTENANCE: ICD-10-CM

## 2023-01-06 LAB
25(OH)D3 SERPL-MCNC: 74.8 NG/ML (ref 30–100)
ALBUMIN SERPL-MCNC: 4.6 G/DL (ref 3.4–5)
ALBUMIN/GLOB SERPL: 1.4 (ref 0.8–1.7)
ALP SERPL-CCNC: 91 U/L (ref 45–117)
ALT SERPL-CCNC: 57 U/L (ref 16–61)
ANION GAP SERPL CALC-SCNC: 5 MMOL/L (ref 3–18)
APPEARANCE UR: CLEAR
AST SERPL-CCNC: 36 U/L (ref 10–38)
BASOPHILS # BLD: 0.1 K/UL (ref 0–0.1)
BASOPHILS NFR BLD: 1 % (ref 0–2)
BILIRUB SERPL-MCNC: 0.8 MG/DL (ref 0.2–1)
BILIRUB UR QL: NEGATIVE
BUN SERPL-MCNC: 13 MG/DL (ref 7–18)
BUN/CREAT SERPL: 11 (ref 12–20)
CALCIUM SERPL-MCNC: 9.8 MG/DL (ref 8.5–10.1)
CHLORIDE SERPL-SCNC: 99 MMOL/L (ref 100–111)
CHOLEST SERPL-MCNC: 169 MG/DL
CO2 SERPL-SCNC: 33 MMOL/L (ref 21–32)
COLOR UR: YELLOW
CREAT SERPL-MCNC: 1.17 MG/DL (ref 0.6–1.3)
CREAT UR-MCNC: 143 MG/DL (ref 30–125)
DIFFERENTIAL METHOD BLD: ABNORMAL
EOSINOPHIL # BLD: 0.1 K/UL (ref 0–0.4)
EOSINOPHIL NFR BLD: 2 % (ref 0–5)
ERYTHROCYTE [DISTWIDTH] IN BLOOD BY AUTOMATED COUNT: 12.3 % (ref 11.6–14.5)
GLOBULIN SER CALC-MCNC: 3.3 G/DL (ref 2–4)
GLUCOSE SERPL-MCNC: 134 MG/DL (ref 74–99)
GLUCOSE UR STRIP.AUTO-MCNC: NEGATIVE MG/DL
HCT VFR BLD AUTO: 53.2 % (ref 36–48)
HDLC SERPL-MCNC: 34 MG/DL (ref 40–60)
HDLC SERPL: 5 (ref 0–5)
HGB BLD-MCNC: 17.9 G/DL (ref 13–16)
HGB UR QL STRIP: NEGATIVE
IMM GRANULOCYTES # BLD AUTO: 0 K/UL (ref 0–0.04)
IMM GRANULOCYTES NFR BLD AUTO: 0 % (ref 0–0.5)
KETONES UR QL STRIP.AUTO: NEGATIVE MG/DL
LDLC SERPL CALC-MCNC: 96.4 MG/DL (ref 0–100)
LEUKOCYTE ESTERASE UR QL STRIP.AUTO: NEGATIVE
LIPID PROFILE,FLP: ABNORMAL
LYMPHOCYTES # BLD: 1.6 K/UL (ref 0.9–3.6)
LYMPHOCYTES NFR BLD: 22 % (ref 21–52)
MCH RBC QN AUTO: 32.5 PG (ref 24–34)
MCHC RBC AUTO-ENTMCNC: 33.6 G/DL (ref 31–37)
MCV RBC AUTO: 96.6 FL (ref 78–100)
MICROALBUMIN UR-MCNC: 0.69 MG/DL (ref 0–3)
MICROALBUMIN/CREAT UR-RTO: 5 MG/G (ref 0–30)
MONOCYTES # BLD: 0.8 K/UL (ref 0.05–1.2)
MONOCYTES NFR BLD: 11 % (ref 3–10)
NEUTS SEG # BLD: 4.8 K/UL (ref 1.8–8)
NEUTS SEG NFR BLD: 65 % (ref 40–73)
NITRITE UR QL STRIP.AUTO: NEGATIVE
NRBC # BLD: 0 K/UL (ref 0–0.01)
NRBC BLD-RTO: 0 PER 100 WBC
PH UR STRIP: 8 (ref 5–8)
PLATELET # BLD AUTO: 196 K/UL (ref 135–420)
PMV BLD AUTO: 11.1 FL (ref 9.2–11.8)
POTASSIUM SERPL-SCNC: 4.3 MMOL/L (ref 3.5–5.5)
PROT SERPL-MCNC: 7.9 G/DL (ref 6.4–8.2)
PROT UR STRIP-MCNC: NEGATIVE MG/DL
PSA SERPL-MCNC: 4.8 NG/ML (ref 0–4)
RBC # BLD AUTO: 5.51 M/UL (ref 4.35–5.65)
SODIUM SERPL-SCNC: 137 MMOL/L (ref 136–145)
SP GR UR REFRACTOMETRY: 1.02 (ref 1–1.03)
TRIGL SERPL-MCNC: 193 MG/DL (ref ?–150)
TSH SERPL DL<=0.05 MIU/L-ACNC: 0.76 UIU/ML (ref 0.36–3.74)
UROBILINOGEN UR QL STRIP.AUTO: 0.2 EU/DL (ref 0.2–1)
VLDLC SERPL CALC-MCNC: 38.6 MG/DL
WBC # BLD AUTO: 7.4 K/UL (ref 4.6–13.2)

## 2023-01-06 PROCEDURE — 82306 VITAMIN D 25 HYDROXY: CPT

## 2023-01-06 PROCEDURE — 80061 LIPID PANEL: CPT

## 2023-01-06 PROCEDURE — 85025 COMPLETE CBC W/AUTO DIFF WBC: CPT

## 2023-01-06 PROCEDURE — 81003 URINALYSIS AUTO W/O SCOPE: CPT

## 2023-01-06 PROCEDURE — 82043 UR ALBUMIN QUANTITATIVE: CPT

## 2023-01-06 PROCEDURE — 84443 ASSAY THYROID STIM HORMONE: CPT

## 2023-01-06 PROCEDURE — 80053 COMPREHEN METABOLIC PANEL: CPT

## 2023-01-06 PROCEDURE — 36415 COLL VENOUS BLD VENIPUNCTURE: CPT

## 2023-01-06 PROCEDURE — 84153 ASSAY OF PSA TOTAL: CPT

## 2023-01-11 NOTE — PROGRESS NOTES
HISTORY OF PRESENT ILLNESS  Jaspal Beck is a 63 y.o. male. He presents for health assessment, preventative care and follow-up with a history of type 2 diabetes, vitamin D deficiency, obesity and GERD symptoms followed by gastroenterology    Mr#: [id number]      Past Medical History:   Diagnosis Date    Bulging lumbar disc June 2014    L5/S1 (seee MRI June 2014); Small non-impinging left posterior disc protrusion L5/S1,    Duodenitis 05/09/2019    Noted on EGD 5/9/19 - Dr. Ian Baron    GERD (gastroesophageal reflux disease)     Uses PRN OTC Prilosec, doing well with low acid foods    Low back pain 5/29/2014    Prediabetes     HbA1C 5.9% 12/15    Right-sided low back pain with right-sided sciatica 07/01/2015    Anticipating surgery 5/30/17 stenosis S1, DDD  L4-S1 with fusion of L5 & S1    Sleep apnea     Has CPAP    Spinal stenosis June 2014    Lumbar spine MRI with mild degenerative spondylosis without high-grade stenosis.     Vitamin D deficiency 2/17/2018 2/14/18:  Vit D 15 --> start OTC Vit D3 5000 units       Past Surgical History:   Procedure Laterality Date    HX ACL RECONSTRUCTION  2004    HX COLONOSCOPY  03/01/2021    No significant mucosal lesions, 10-year follow-up, Dr. sIrael Terrazas ENDOSCOPY N/A 05/09/2019    Dr. Victor Manuel Gage, [facility] [place], report in [address]  05/30/2017    [facility] [place] - Dr. Elliott Abreu        Family History   Problem Relation Age of Onset    Diabetes Maternal Grandfather        Allergies   Allergen Reactions    Lyrica [Pregabalin] Other (comments)     Weight gain       Social History     Tobacco Use   Smoking Status Never   Smokeless Tobacco Never   Tobacco Comments    \"light smoking at 20 to look cool at the bars to  women\"       Social History     Substance and Sexual Activity   Alcohol Use Yes    Comment: 1/mo           Patient Active Problem List   Diagnosis Code    Sleep apnea G47.30    GERD (gastroesophageal reflux disease) K21.9 Prediabetes R73.03    Right-sided low back pain with right-sided sciatica M54.41    Spinal stenosis M48.00    Vitamin D deficiency E55.9    Severe obesity ([facility]) E66.01    Skin tag L91.8    Skin growth D49. 2    Type 2 diabetes mellitus without complication, without long-term current use of insulin ([facility]) E11.9    Obesity (BMI 30.0-34. 9) E66.9         Current Outpatient Medications:     omeprazole (PRILOSEC) 20 mg capsule, TAKE 1 TABLET BY MOUTH DAILY. , Disp: 90 Capsule, Rfl: 4    ergocalciferol (ERGOCALCIFEROL) 1,250 mcg (50,000 unit) capsule, Take 1 Capsule by mouth every seven (7) days. , Disp: 12 Capsule, Rfl: 4    metFORMIN ER (GLUCOPHAGE XR) 500 mg tablet, TAKE 2 TABLETS BY MOUTH DAILY WITH DINNER, Disp: 180 Tablet, Rfl: 2    cholecalciferol (VITAMIN D3) (2,000 UNITS /50 MCG) cap capsule, Take 2 capsules daily, Disp: 180 Cap, Rfl: 3    cpap machine kit, by Does Not Apply route. G47.33 - Update CPAP set @ Auto, 10-15cm H20, heated humidifier, tubing, climate line, filters (6), chinstrap, integrated heated humidifier. Authorized for 2nd mask and headgear within one year if needed, Disp: 1 Kit, Rfl: 0         Review of Systems   Constitutional:  Negative for chills, fever and weight loss. HENT:  Negative for congestion, ear pain, hearing loss and sore throat. Eyes:  Negative for blurred vision and double vision. Respiratory:  Negative for cough, shortness of breath and wheezing. Persistent cough sees pulmonary   Cardiovascular:  Negative for chest pain, palpitations and leg swelling. Gastrointestinal:  Positive for heartburn (Taking omeprazole). Negative for abdominal pain, blood in stool, constipation, diarrhea, melena, nausea and vomiting. Genitourinary:  Negative for dysuria and urgency. Musculoskeletal:  Positive for myalgias (nocturnal cramping). Negative for joint pain. Skin:  Negative for itching and rash.    Neurological:  Negative for dizziness, tingling, sensory change, focal weakness and headaches. Endo/Heme/Allergies:  Negative for environmental allergies. Psychiatric/Behavioral:  Negative for depression. The patient is not nervous/anxious and does not have insomnia. Visit Vitals  /80   Pulse 64   Temp 97.6 °F (36.4 °C) (Temporal)   Resp 16   Ht 5' 9\" (1.753 m)   Wt 234 lb (106.1 kg)   SpO2 95%   BMI 34.56 kg/m²       Physical Exam  Vitals and nursing note reviewed. Constitutional:       General: He is not in acute distress. Appearance: Normal appearance. He is obese. He is not ill-appearing. HENT:      Head: Normocephalic. Right Ear: Tympanic membrane, ear canal and external ear normal.      Left Ear: Tympanic membrane, ear canal and external ear normal.      Mouth/Throat:      Mouth: Mucous membranes are moist.      Pharynx: Oropharynx is clear. Eyes:      Extraocular Movements: Extraocular movements intact. Conjunctiva/sclera: Conjunctivae normal.      Pupils: Pupils are equal, round, and reactive to light. Neck:      Vascular: No carotid bruit. Cardiovascular:      Rate and Rhythm: Normal rate and regular rhythm. Heart sounds: Normal heart sounds. Pulmonary:      Effort: Pulmonary effort is normal.      Breath sounds: Normal breath sounds. Abdominal:      Palpations: Abdomen is soft. Tenderness: There is no abdominal tenderness. Musculoskeletal:         General: No deformity. Cervical back: Neck supple. Right lower leg: No edema. Left lower leg: No edema. Skin:     General: Skin is warm and dry. Neurological:      General: No focal deficit present. Mental Status: He is alert and oriented to person, place, and time. Psychiatric:         Mood and Affect: Mood normal.         Behavior: Behavior normal.                 ASSESSMENT and PLAN    ICD-10-CM ICD-9-CM    1. Routine general medical examination at a health care facility  Z00.00 V70.0       2.  Type 2 diabetes mellitus without complication, without long-term current use of insulin (HCC)  E11.9 250.00 AMB POC HEMOGLOBIN A1C      3. Increased prostate specific antigen (PSA) velocity  R97.20 790.93       4. Vitamin D deficiency  E55.9 268.9 ergocalciferol (ERGOCALCIFEROL) 1,250 mcg (50,000 unit) capsule      5. Severe obesity ([address]  E66.01 278.01       6. Gastroesophageal reflux disease, unspecified whether esophagitis present  K21.9 530.81 omeprazole (PRILOSEC) 20 mg capsule      Assessment:  Type 2 diabetes in satisfactory control  LDL cholesterol level above goal  Increased PSA velocity  Satisfactory vitamin D level  Gastroesophageal reflux symptoms adequately controlled with current treatment  Obesity improved    Health maintenance recommendations:  COVID-19 immunization with bivalent vaccine  Diabetic eye exam ordered as current    Plan:  Begin atorvastatin 40 mg daily  Return for fasting lab appointment for repeat lipid levels and repeat PSA level in 3 months  Avoid dietary starch and sugar and follow program of regular aerobic exercise  Return for follow-up with an in office hemoglobin A1c in 6 months unless today's lab indicates a need for treatment change and additional follow-up lab  Please always arrive at least 15 minutes before your scheduled appointment time  Melissa Lanier MD      PLEASE NOTE:   This document has been produced using voice recognition software. Unrecognized errors in transcription may be present.

## 2023-01-12 ENCOUNTER — OFFICE VISIT (OUTPATIENT)
Dept: FAMILY MEDICINE CLINIC | Age: 63
End: 2023-01-12
Payer: OTHER GOVERNMENT

## 2023-01-12 VITALS
HEIGHT: 69 IN | HEART RATE: 64 BPM | TEMPERATURE: 97.6 F | OXYGEN SATURATION: 95 % | RESPIRATION RATE: 16 BRPM | DIASTOLIC BLOOD PRESSURE: 80 MMHG | BODY MASS INDEX: 34.66 KG/M2 | SYSTOLIC BLOOD PRESSURE: 130 MMHG | WEIGHT: 234 LBS

## 2023-01-12 DIAGNOSIS — K21.9 GASTROESOPHAGEAL REFLUX DISEASE, UNSPECIFIED WHETHER ESOPHAGITIS PRESENT: ICD-10-CM

## 2023-01-12 DIAGNOSIS — E11.9 TYPE 2 DIABETES MELLITUS WITHOUT COMPLICATION, WITHOUT LONG-TERM CURRENT USE OF INSULIN (HCC): ICD-10-CM

## 2023-01-12 DIAGNOSIS — E55.9 VITAMIN D DEFICIENCY: ICD-10-CM

## 2023-01-12 DIAGNOSIS — E66.01 SEVERE OBESITY (HCC): ICD-10-CM

## 2023-01-12 DIAGNOSIS — R97.20 INCREASED PROSTATE SPECIFIC ANTIGEN (PSA) VELOCITY: ICD-10-CM

## 2023-01-12 DIAGNOSIS — Z00.00 ROUTINE GENERAL MEDICAL EXAMINATION AT A HEALTH CARE FACILITY: Primary | ICD-10-CM

## 2023-01-12 LAB — HBA1C MFR BLD HPLC: 7 %

## 2023-01-12 PROCEDURE — 99213 OFFICE O/P EST LOW 20 MIN: CPT | Performed by: FAMILY MEDICINE

## 2023-01-12 PROCEDURE — 99396 PREV VISIT EST AGE 40-64: CPT | Performed by: FAMILY MEDICINE

## 2023-01-12 PROCEDURE — 83036 HEMOGLOBIN GLYCOSYLATED A1C: CPT | Performed by: FAMILY MEDICINE

## 2023-01-12 RX ORDER — ERGOCALCIFEROL 1.25 MG/1
50000 CAPSULE ORAL
Qty: 12 CAPSULE | Refills: 4 | Status: SHIPPED | OUTPATIENT
Start: 2023-01-12

## 2023-01-12 RX ORDER — ATORVASTATIN CALCIUM 40 MG/1
40 TABLET, FILM COATED ORAL DAILY
Qty: 90 TABLET | Refills: 3 | Status: SHIPPED | OUTPATIENT
Start: 2023-01-12

## 2023-01-12 RX ORDER — OMEPRAZOLE 20 MG/1
CAPSULE, DELAYED RELEASE ORAL
Qty: 90 CAPSULE | Refills: 4 | Status: SHIPPED | OUTPATIENT
Start: 2023-01-12

## 2023-01-12 NOTE — PATIENT INSTRUCTIONS
Assessment:  Type 2 diabetes in satisfactory control  LDL cholesterol level above goal  Increased PSA velocity  Satisfactory vitamin D level  Gastroesophageal reflux symptoms adequately controlled with current treatment  Obesity improved    Health maintenance recommendations:  COVID-19 immunization with bivalent vaccine  Diabetic eye exam ordered as current    Plan:  Begin atorvastatin 40 mg daily  Return for fasting lab appointment for repeat lipid levels and repeat PSA level in 3 months  Avoid dietary starch and sugar and follow program of regular aerobic exercise  Return for follow-up with an in office hemoglobin A1c in 6 months unless today's lab indicates a need for treatment change and additional follow-up lab  Please always arrive at least 15 minutes before your scheduled appointment time

## 2023-01-12 NOTE — PROGRESS NOTES
Julianna Novak is a 63 y.o. male (: 1960) presenting to address:    Chief Complaint   Patient presents with    Diabetes       Vitals:    23 0741   BP: 130/80   Pulse: 64   Resp: 16   Temp: 97.6 °F (36.4 °C)   TempSrc: Temporal   SpO2: 95%   Weight: 234 lb (106.1 kg)   Height: 5' 9\" (1.753 m)   PainSc:   0 - No pain       Hearing/Vision:   No results found. Learning Assessment:     Learning Assessment 2014   PRIMARY LEARNER Patient   HIGHEST LEVEL OF EDUCATION - PRIMARY LEARNER  -   BARRIERS PRIMARY LEARNER -   PRIMARY LANGUAGE ENGLISH   LEARNER PREFERENCE PRIMARY READING   ANSWERED BY patient   RELATIONSHIP SELF     Depression Screening:     3 most recent PHQ Screens 2023   PHQ Not Done -   Little interest or pleasure in doing things Not at all   Feeling down, depressed, irritable, or hopeless Not at all   Total Score PHQ 2 0     Fall Risk Assessment:     Fall Risk Assessment, last 12 mths 2019   Able to walk? Yes   Fall in past 12 months? No     Abuse Screening:     Abuse Screening Questionnaire 2019   Do you ever feel afraid of your partner? N   Are you in a relationship with someone who physically or mentally threatens you? N   Is it safe for you to go home? Y     ADL Assessment:   No flowsheet data found. Coordination of Care Questionaire:   1. \"Have you been to the ER, urgent care clinic since your last visit? Hospitalized since your last visit? \" No    2. \"Have you seen or consulted any other health care providers outside of the [address] [place] [address] since your last visit? \" No     3. For patients aged 39-70: Has the patient had a colonoscopy? Yes - no Care Gap present     Advanced Directive:   1. Do you have an Advanced Directive? NO    2. Would you like information on Advanced Directives?  NO

## 2023-02-04 DIAGNOSIS — E11.9 TYPE 2 DIABETES MELLITUS WITHOUT COMPLICATION, WITHOUT LONG-TERM CURRENT USE OF INSULIN (HCC): Primary | ICD-10-CM

## 2023-02-05 DIAGNOSIS — E11.9 TYPE 2 DIABETES MELLITUS WITHOUT COMPLICATION, WITHOUT LONG-TERM CURRENT USE OF INSULIN (HCC): Primary | ICD-10-CM

## 2023-02-07 DIAGNOSIS — R97.20 INCREASED PROSTATE SPECIFIC ANTIGEN (PSA) VELOCITY: Primary | ICD-10-CM

## 2023-03-21 SDOH — ECONOMIC STABILITY: TRANSPORTATION INSECURITY
IN THE PAST 12 MONTHS, HAS LACK OF TRANSPORTATION KEPT YOU FROM MEETINGS, WORK, OR FROM GETTING THINGS NEEDED FOR DAILY LIVING?: NO

## 2023-03-21 SDOH — ECONOMIC STABILITY: FOOD INSECURITY: WITHIN THE PAST 12 MONTHS, YOU WORRIED THAT YOUR FOOD WOULD RUN OUT BEFORE YOU GOT MONEY TO BUY MORE.: NEVER TRUE

## 2023-03-21 SDOH — ECONOMIC STABILITY: FOOD INSECURITY: WITHIN THE PAST 12 MONTHS, THE FOOD YOU BOUGHT JUST DIDN'T LAST AND YOU DIDN'T HAVE MONEY TO GET MORE.: NEVER TRUE

## 2023-03-21 SDOH — ECONOMIC STABILITY: INCOME INSECURITY: HOW HARD IS IT FOR YOU TO PAY FOR THE VERY BASICS LIKE FOOD, HOUSING, MEDICAL CARE, AND HEATING?: NOT HARD AT ALL

## 2023-03-21 SDOH — ECONOMIC STABILITY: HOUSING INSECURITY
IN THE LAST 12 MONTHS, WAS THERE A TIME WHEN YOU DID NOT HAVE A STEADY PLACE TO SLEEP OR SLEPT IN A SHELTER (INCLUDING NOW)?: NO

## 2023-03-22 ENCOUNTER — OFFICE VISIT (OUTPATIENT)
Dept: FAMILY MEDICINE CLINIC | Facility: CLINIC | Age: 63
End: 2023-03-22

## 2023-03-22 VITALS
OXYGEN SATURATION: 97 % | WEIGHT: 233 LBS | RESPIRATION RATE: 16 BRPM | HEART RATE: 46 BPM | TEMPERATURE: 97.7 F | DIASTOLIC BLOOD PRESSURE: 70 MMHG | BODY MASS INDEX: 34.51 KG/M2 | SYSTOLIC BLOOD PRESSURE: 118 MMHG | HEIGHT: 69 IN

## 2023-03-22 DIAGNOSIS — G47.62 NOCTURNAL LEG CRAMPS: Primary | ICD-10-CM

## 2023-03-22 RX ORDER — FLUTICASONE PROPIONATE 50 MCG
1 SPRAY, SUSPENSION (ML) NASAL DAILY
COMMUNITY

## 2023-03-22 RX ORDER — TIOTROPIUM BROMIDE INHALATION SPRAY 3.12 UG/1
1 SPRAY, METERED RESPIRATORY (INHALATION) DAILY
COMMUNITY
Start: 2023-03-18

## 2023-03-22 RX ORDER — ALBUTEROL SULFATE 90 UG/1
2 AEROSOL, METERED RESPIRATORY (INHALATION) EVERY 6 HOURS PRN
COMMUNITY
Start: 2021-08-01

## 2023-03-22 NOTE — PROGRESS NOTES
Sandra Panchal is a 63 y.o. male (: 1960) presenting to address:    Chief Complaint   Patient presents with    Leg Pain     Cramps        Vitals:    23 0838   BP: 118/70   Pulse: (!) 46   SpO2: 97%       Coordination of Care Questionaire:   1. \"Have you been to the ER, urgent care clinic since your last visit? Hospitalized since your last visit? \" No    2. \"Have you seen or consulted any other health care providers outside of the [address] [place] [address] since your last visit? \" No     3. For patients aged 39-70: Has the patient had a colonoscopy / FIT/ Cologuard? Yes - no Care Gap present      If the patient is female:    4. For patients aged 41-77: Has the patient had a mammogram within the past 2 years? NA - based on age or sex      11. For patients aged 21-65: Has the patient had a pap smear? NA - based on age or sex    Advanced Directive:   1. Do you have an Advanced Directive? No    2. Would you like information on Advanced Directives?  No

## 2023-04-24 DIAGNOSIS — E11.9 TYPE 2 DIABETES MELLITUS WITHOUT COMPLICATION, WITHOUT LONG-TERM CURRENT USE OF INSULIN (HCC): ICD-10-CM

## 2023-04-24 DIAGNOSIS — R63.4 RECENT UNEXPLAINED WEIGHT LOSS: Primary | ICD-10-CM

## 2023-04-26 ENCOUNTER — HOSPITAL ENCOUNTER (OUTPATIENT)
Facility: HOSPITAL | Age: 63
Setting detail: SPECIMEN
Discharge: HOME | End: 2023-04-29
Payer: OTHER GOVERNMENT

## 2023-04-26 LAB
ALT SERPL-CCNC: 52 U/L (ref 16–61)
ANION GAP SERPL CALC-SCNC: 7 MMOL/L (ref 3–18)
AST SERPL-CCNC: 24 U/L (ref 10–38)
BASOPHILS # BLD: 0.1 K/UL (ref 0–0.1)
BASOPHILS NFR BLD: 1 % (ref 0–2)
BUN SERPL-MCNC: 11 MG/DL (ref 7–18)
BUN/CREAT SERPL: 10 (ref 12–20)
CALCIUM SERPL-MCNC: 9.1 MG/DL (ref 8.5–10.1)
CHLORIDE SERPL-SCNC: 101 MMOL/L (ref 100–111)
CHOLEST SERPL-MCNC: 136 MG/DL
CO2 SERPL-SCNC: 29 MMOL/L (ref 21–32)
CREAT SERPL-MCNC: 1.07 MG/DL (ref 0.6–1.3)
DIFFERENTIAL METHOD BLD: ABNORMAL
EOSINOPHIL # BLD: 0.1 K/UL (ref 0–0.4)
EOSINOPHIL NFR BLD: 1 % (ref 0–5)
ERYTHROCYTE [DISTWIDTH] IN BLOOD BY AUTOMATED COUNT: 12.1 % (ref 11.6–14.5)
ERYTHROCYTE [SEDIMENTATION RATE] IN BLOOD: 1 MM/HR (ref 0–20)
GLUCOSE SERPL-MCNC: 323 MG/DL (ref 74–99)
HCT VFR BLD AUTO: 47 % (ref 36–48)
HDLC SERPL-MCNC: 34 MG/DL (ref 40–60)
HDLC SERPL: 4 (ref 0–5)
HGB BLD-MCNC: 16.4 G/DL (ref 13–16)
IMM GRANULOCYTES # BLD AUTO: 0 K/UL (ref 0–0.04)
IMM GRANULOCYTES NFR BLD AUTO: 0 % (ref 0–0.5)
LDLC SERPL CALC-MCNC: 73.2 MG/DL (ref 0–100)
LIPID PANEL: ABNORMAL
LYMPHOCYTES # BLD: 1.6 K/UL (ref 0.9–3.6)
LYMPHOCYTES NFR BLD: 28 % (ref 21–52)
MCH RBC QN AUTO: 32.6 PG (ref 24–34)
MCHC RBC AUTO-ENTMCNC: 34.9 G/DL (ref 31–37)
MCV RBC AUTO: 93.4 FL (ref 78–100)
MONOCYTES # BLD: 0.7 K/UL (ref 0.05–1.2)
MONOCYTES NFR BLD: 12 % (ref 3–10)
NEUTS SEG # BLD: 3.3 K/UL (ref 1.8–8)
NEUTS SEG NFR BLD: 58 % (ref 40–73)
NRBC # BLD: 0 K/UL (ref 0–0.01)
NRBC BLD-RTO: 0 PER 100 WBC
PLATELET # BLD AUTO: 152 K/UL (ref 135–420)
PMV BLD AUTO: 11.7 FL (ref 9.2–11.8)
POTASSIUM SERPL-SCNC: 4.3 MMOL/L (ref 3.5–5.5)
PSA SERPL-MCNC: 1.7 NG/ML (ref 0–4)
RBC # BLD AUTO: 5.03 M/UL (ref 4.35–5.65)
SODIUM SERPL-SCNC: 137 MMOL/L (ref 136–145)
T4 FREE SERPL-MCNC: 1.1 NG/DL (ref 0.7–1.5)
TRIGL SERPL-MCNC: 144 MG/DL
TSH SERPL DL<=0.05 MIU/L-ACNC: 1.21 UIU/ML (ref 0.36–3.74)
VLDLC SERPL CALC-MCNC: 28.8 MG/DL
WBC # BLD AUTO: 5.7 K/UL (ref 4.6–13.2)

## 2023-04-26 PROCEDURE — 84460 ALANINE AMINO (ALT) (SGPT): CPT

## 2023-04-26 PROCEDURE — 84153 ASSAY OF PSA TOTAL: CPT

## 2023-04-26 PROCEDURE — 80061 LIPID PANEL: CPT

## 2023-04-26 PROCEDURE — 84450 TRANSFERASE (AST) (SGOT): CPT

## 2023-04-26 PROCEDURE — 85025 COMPLETE CBC W/AUTO DIFF WBC: CPT

## 2023-04-26 PROCEDURE — 84443 ASSAY THYROID STIM HORMONE: CPT

## 2023-04-26 PROCEDURE — 84439 ASSAY OF FREE THYROXINE: CPT

## 2023-04-26 PROCEDURE — 85652 RBC SED RATE AUTOMATED: CPT

## 2023-04-26 PROCEDURE — 36415 COLL VENOUS BLD VENIPUNCTURE: CPT

## 2023-04-26 PROCEDURE — 80048 BASIC METABOLIC PNL TOTAL CA: CPT

## 2023-04-27 ENCOUNTER — TELEPHONE (OUTPATIENT)
Dept: FAMILY MEDICINE CLINIC | Facility: CLINIC | Age: 63
End: 2023-04-27

## 2023-04-27 DIAGNOSIS — E11.65 TYPE 2 DIABETES MELLITUS WITH HYPERGLYCEMIA, WITHOUT LONG-TERM CURRENT USE OF INSULIN (HCC): Primary | ICD-10-CM

## 2023-04-27 NOTE — TELEPHONE ENCOUNTER
Please advise MrClaudia Wooten that his lab results show a much higher blood sugar level than usual and this is probably the cause of his weight loss. He should continue the metformin and I sent a prescription to his pharmacy for Jardiance 10 mg daily to help bring down the blood sugar level. He should call and tell us right away if he is unable to fill the prescription.   We can go over the lab and the rest of the issues in more detail at his office visit on 5/15/2023

## 2023-05-15 ENCOUNTER — HOSPITAL ENCOUNTER (OUTPATIENT)
Facility: HOSPITAL | Age: 63
Setting detail: SPECIMEN
Discharge: HOME | End: 2023-05-18
Payer: OTHER GOVERNMENT

## 2023-05-15 ENCOUNTER — OFFICE VISIT (OUTPATIENT)
Dept: FAMILY MEDICINE CLINIC | Facility: CLINIC | Age: 63
End: 2023-05-15
Payer: OTHER GOVERNMENT

## 2023-05-15 VITALS
WEIGHT: 222 LBS | DIASTOLIC BLOOD PRESSURE: 78 MMHG | RESPIRATION RATE: 16 BRPM | HEIGHT: 69 IN | OXYGEN SATURATION: 96 % | BODY MASS INDEX: 32.88 KG/M2 | HEART RATE: 54 BPM | SYSTOLIC BLOOD PRESSURE: 118 MMHG | TEMPERATURE: 97.7 F

## 2023-05-15 DIAGNOSIS — R63.4 UNINTENTIONAL WEIGHT LOSS: ICD-10-CM

## 2023-05-15 DIAGNOSIS — G47.62 NOCTURNAL LEG CRAMPS: ICD-10-CM

## 2023-05-15 DIAGNOSIS — E11.65 TYPE 2 DIABETES MELLITUS WITH HYPERGLYCEMIA, WITH LONG-TERM CURRENT USE OF INSULIN (HCC): Primary | ICD-10-CM

## 2023-05-15 DIAGNOSIS — Z79.4 TYPE 2 DIABETES MELLITUS WITH HYPERGLYCEMIA, WITH LONG-TERM CURRENT USE OF INSULIN (HCC): Primary | ICD-10-CM

## 2023-05-15 LAB
GLUCOSE, POC: NORMAL MG/DL
HBA1C MFR BLD: 10.6 %
MAGNESIUM SERPL-MCNC: 2.4 MG/DL (ref 1.6–2.6)

## 2023-05-15 PROCEDURE — 99214 OFFICE O/P EST MOD 30 MIN: CPT | Performed by: FAMILY MEDICINE

## 2023-05-15 PROCEDURE — 82947 ASSAY GLUCOSE BLOOD QUANT: CPT | Performed by: FAMILY MEDICINE

## 2023-05-15 PROCEDURE — 83036 HEMOGLOBIN GLYCOSYLATED A1C: CPT | Performed by: FAMILY MEDICINE

## 2023-05-15 PROCEDURE — 36415 COLL VENOUS BLD VENIPUNCTURE: CPT

## 2023-05-15 PROCEDURE — 83735 ASSAY OF MAGNESIUM: CPT

## 2023-05-15 RX ORDER — LANCING DEVICE
EACH MISCELLANEOUS
Qty: 1 KIT | Refills: 1 | Status: SHIPPED | OUTPATIENT
Start: 2023-05-15

## 2023-05-15 RX ORDER — LANCING DEVICE
EACH MISCELLANEOUS
Qty: 100 EACH | Refills: 3 | Status: SHIPPED | OUTPATIENT
Start: 2023-05-15

## 2023-05-15 RX ORDER — PREDNISONE 20 MG/1
TABLET ORAL
COMMUNITY
Start: 2023-02-27 | End: 2023-05-15 | Stop reason: ALTCHOICE

## 2023-05-15 RX ORDER — INSULIN HUMAN 100 [IU]/ML
INJECTION, SUSPENSION SUBCUTANEOUS
Qty: 3 ADJUSTABLE DOSE PRE-FILLED PEN SYRINGE | Refills: 3 | Status: SHIPPED | OUTPATIENT
Start: 2023-05-15

## 2023-05-15 ASSESSMENT — PATIENT HEALTH QUESTIONNAIRE - PHQ9
2. FEELING DOWN, DEPRESSED OR HOPELESS: 0
SUM OF ALL RESPONSES TO PHQ9 QUESTIONS 1 & 2: 0
1. LITTLE INTEREST OR PLEASURE IN DOING THINGS: 0
SUM OF ALL RESPONSES TO PHQ QUESTIONS 1-9: 0

## 2023-05-15 ASSESSMENT — ENCOUNTER SYMPTOMS
DIARRHEA: 0
VOMITING: 0
NAUSEA: 1
ABDOMINAL PAIN: 0

## 2023-05-15 NOTE — PATIENT INSTRUCTIONS
Current Status:  Recently reported unintentional weight loss  Type 2 diabetes with recent atypical hyperglycemia  Lipid levels improving    Health Maintenance Recommendations:  COVID-19 immunization with bivalent vaccine  Diabetic eye exam  HIV screen with next routine lab draw    Plan:  Lab today  Continue current medications but increase Jardiance to 10 mg daily  Begin 70/30 insulin 20 units daily with breakfast and 15 units daily with dinner, check glucose level before each dose and give only if 120 or greater  Check glucose before each insulin administration and at any time there are symptoms of hypoglycemia or other atypical symptoms  Avoid dietary starch and sugar and as much as possible follow program of regular aerobic exercise. Return for follow-up Wednesday  Please always arrive at least 15 minutes before your scheduled appointment time.

## 2023-05-15 NOTE — PROGRESS NOTES
Sherly Yañez is a 63 y.o. male (: 1960) presenting to address:    Chief Complaint   Patient presents with    Diabetes       Vitals:    05/15/23 1501   BP: 118/78   Pulse: 54   Resp: 16   Temp: 97.7 °F (36.5 °C)   SpO2: 96%       Coordination of Care Questionaire:   1. \"Have you been to the ER, urgent care clinic since your last visit? Hospitalized since your last visit? \" No    2. \"Have you seen or consulted any other health care providers outside of the [address] [place] [address] since your last visit? \" No     3. For patients aged 39-70: Has the patient had a colonoscopy / FIT/ Cologuard? Yes - no Care Gap present      If the patient is female:    4. For patients aged 41-77: Has the patient had a mammogram within the past 2 years? NA - based on age or sex      11. For patients aged 21-65: Has the patient had a pap smear? NA - based on age or sex    Advanced Directive:   1. Do you have an Advanced Directive? No    2. Would you like information on Advanced Directives?  No

## 2023-05-17 ENCOUNTER — TELEPHONE (OUTPATIENT)
Dept: FAMILY MEDICINE CLINIC | Facility: CLINIC | Age: 63
End: 2023-05-17

## 2023-05-17 NOTE — TELEPHONE ENCOUNTER
Telephone note from after 8 PM yesterday:    Dr. Carson Ardon,  I will not be able to get the  HumuLIN 70/30 KwikPen (insulin NPH Isophane & Regular) until Wed (17May) at 5pm.  They have it on order and said it should have been in on Tues (16May), but it did not happen. Do you still want to see at 2:30 on Wed (17May) for the followup? Please advise that if he has not had any new or worsening symptoms it would be reasonable to cancel the appointment for today. Please assist him with scheduling an appointment for Monday.

## 2023-05-23 PROBLEM — Z79.4 TYPE 2 DIABETES MELLITUS WITH HYPERGLYCEMIA, WITH LONG-TERM CURRENT USE OF INSULIN (HCC): Status: ACTIVE | Noted: 2022-12-18

## 2023-05-23 PROBLEM — E11.65 TYPE 2 DIABETES MELLITUS WITH HYPERGLYCEMIA, WITH LONG-TERM CURRENT USE OF INSULIN (HCC): Status: ACTIVE | Noted: 2022-12-18

## 2023-05-23 NOTE — PROGRESS NOTES
HISTORY OF PRESENT ILLNESS  Jonatan Justice  is a 63 y.o. y.o. male    He returns for follow-up of recent dramatic worsening of diabetic control with associated weight loss and also with persistent severe nocturnal leg cramping resulting in the following assessment and plan:    Current Status:  Recently reported unintentional weight loss  Type 2 diabetes with recent atypical hyperglycemia  Lipid levels improving  Severe nocturnal leg cramps     Health Maintenance Recommendations:  COVID-19 immunization with bivalent vaccine  Diabetic eye exam  HIV screen with next routine lab draw     Plan:  Lab today-BMP, magnesium  Continue current medications but increase Jardiance to 10 mg daily  Begin 70/30 insulin 20 units daily with breakfast and 15 units daily with dinner, check glucose level before each dose and give only if 120 or greater  Check glucose before each insulin administration and at any time there are symptoms of hypoglycemia or other atypical symptoms  Avoid dietary starch and sugar and as much as possible follow program of regular aerobic exercise. Return for follow-up in 48 hours on Wednesday 5/18/2023  Follow-up was postponed because of temporary unavailability of the prescribed insulin      Today he reports that his polydipsia and polyuria symptoms have resolved. He has not had difficulty administering twice daily insulin and continues to take Jardiance and metformin. His lowest recorded glucose level since starting insulin is 74 with results averaging around 150. He reports that since he stopped drinking a carbonated drink with \"added sugar\" his nocturnal leg cramps have nearly resolved. Mr#: [id number]      Past Medical History:   Diagnosis Date    Bulging lumbar disc June 2014    L5/S1 (seee MRI June 2014);  Small non-impinging left posterior disc protrusion L5/S1,    Duodenitis 05/09/2019    Noted on EGD 5/9/19 - Dr. Dora Pratt    GERD (gastroesophageal reflux disease)     Uses PRN OTC Prilosec, doing

## 2023-05-24 ENCOUNTER — OFFICE VISIT (OUTPATIENT)
Dept: FAMILY MEDICINE CLINIC | Facility: CLINIC | Age: 63
End: 2023-05-24
Payer: OTHER GOVERNMENT

## 2023-05-24 VITALS
DIASTOLIC BLOOD PRESSURE: 100 MMHG | SYSTOLIC BLOOD PRESSURE: 124 MMHG | RESPIRATION RATE: 16 BRPM | WEIGHT: 220 LBS | HEIGHT: 69 IN | OXYGEN SATURATION: 95 % | TEMPERATURE: 97.6 F | BODY MASS INDEX: 32.58 KG/M2 | HEART RATE: 97 BPM

## 2023-05-24 DIAGNOSIS — I10 PRIMARY HYPERTENSION: ICD-10-CM

## 2023-05-24 DIAGNOSIS — E11.65 TYPE 2 DIABETES MELLITUS WITH HYPERGLYCEMIA, WITH LONG-TERM CURRENT USE OF INSULIN (HCC): Primary | ICD-10-CM

## 2023-05-24 DIAGNOSIS — R63.4 UNINTENTIONAL WEIGHT LOSS: ICD-10-CM

## 2023-05-24 DIAGNOSIS — Z79.4 TYPE 2 DIABETES MELLITUS WITH HYPERGLYCEMIA, WITH LONG-TERM CURRENT USE OF INSULIN (HCC): Primary | ICD-10-CM

## 2023-05-24 DIAGNOSIS — G47.62 NOCTURNAL LEG CRAMPS: ICD-10-CM

## 2023-05-24 LAB — GLUCOSE, POC: 181 MG/DL

## 2023-05-24 PROCEDURE — 3074F SYST BP LT 130 MM HG: CPT | Performed by: FAMILY MEDICINE

## 2023-05-24 PROCEDURE — 82947 ASSAY GLUCOSE BLOOD QUANT: CPT | Performed by: FAMILY MEDICINE

## 2023-05-24 PROCEDURE — 99214 OFFICE O/P EST MOD 30 MIN: CPT | Performed by: FAMILY MEDICINE

## 2023-05-24 PROCEDURE — 3080F DIAST BP >= 90 MM HG: CPT | Performed by: FAMILY MEDICINE

## 2023-05-24 RX ORDER — LOSARTAN POTASSIUM 25 MG/1
25 TABLET ORAL DAILY
Qty: 90 TABLET | Refills: 3 | Status: SHIPPED | OUTPATIENT
Start: 2023-05-24

## 2023-05-24 NOTE — PROGRESS NOTES
Soham Jerez is a 63 y.o. male (: 1960) presenting to address:    Chief Complaint   Patient presents with    Diabetes       Vitals:    23 1331   BP: (!) 124/100   Pulse:    Resp:    Temp:    SpO2:        Coordination of Care Questionaire:   1. \"Have you been to the ER, urgent care clinic since your last visit? Hospitalized since your last visit? \" No    2. \"Have you seen or consulted any other health care providers outside of the [address] [place] [address] since your last visit? \" No     3. For patients aged 39-70: Has the patient had a colonoscopy / FIT/ Cologuard? Yes - no Care Gap present      If the patient is female:    4. For patients aged 41-77: Has the patient had a mammogram within the past 2 years? NA - based on age or sex      11. For patients aged 21-65: Has the patient had a pap smear? NA - based on age or sex    Advanced Directive:   1. Do you have an Advanced Directive? No    2. Would you like information on Advanced Directives?  No

## 2023-05-24 NOTE — PATIENT INSTRUCTIONS
Current Status:    Diabetic control much improved  Nocturnal leg cramps nearly resolved  Now with elevated BP    Health Maintenance Recommendations:  COVID-19 immunization with bivalent vaccine  Diabetic eye exam  HIV screening with next routine lab draw    Plan:  Continue all current medications  Begin losartan 25 mg daily  Return for follow-up in 3 weeks or sooner with any problems

## 2023-06-05 RX ORDER — ATORVASTATIN CALCIUM 40 MG/1
40 TABLET, FILM COATED ORAL DAILY
COMMUNITY
Start: 2023-01-12 | End: 2023-06-05 | Stop reason: SDUPTHER

## 2023-06-05 RX ORDER — ATORVASTATIN CALCIUM 40 MG/1
40 TABLET, FILM COATED ORAL DAILY
Qty: 90 TABLET | Refills: 3 | Status: SHIPPED | OUTPATIENT
Start: 2023-06-05

## 2023-06-12 DIAGNOSIS — Z79.4 TYPE 2 DIABETES MELLITUS WITH HYPERGLYCEMIA, WITH LONG-TERM CURRENT USE OF INSULIN (HCC): ICD-10-CM

## 2023-06-12 DIAGNOSIS — E11.65 TYPE 2 DIABETES MELLITUS WITH HYPERGLYCEMIA, WITH LONG-TERM CURRENT USE OF INSULIN (HCC): ICD-10-CM

## 2023-06-21 ENCOUNTER — OFFICE VISIT (OUTPATIENT)
Dept: FAMILY MEDICINE CLINIC | Facility: CLINIC | Age: 63
End: 2023-06-21
Payer: OTHER GOVERNMENT

## 2023-06-21 VITALS
TEMPERATURE: 98.1 F | HEIGHT: 69 IN | HEART RATE: 76 BPM | DIASTOLIC BLOOD PRESSURE: 84 MMHG | WEIGHT: 220 LBS | BODY MASS INDEX: 32.58 KG/M2 | RESPIRATION RATE: 16 BRPM | SYSTOLIC BLOOD PRESSURE: 132 MMHG | OXYGEN SATURATION: 95 %

## 2023-06-21 DIAGNOSIS — I10 PRIMARY HYPERTENSION: ICD-10-CM

## 2023-06-21 DIAGNOSIS — E11.65 TYPE 2 DIABETES MELLITUS WITH HYPERGLYCEMIA, WITHOUT LONG-TERM CURRENT USE OF INSULIN (HCC): Primary | ICD-10-CM

## 2023-06-21 DIAGNOSIS — R63.4 UNINTENTIONAL WEIGHT LOSS: ICD-10-CM

## 2023-06-21 LAB — GLUCOSE, POC: 137 MG/DL

## 2023-06-21 PROCEDURE — 3079F DIAST BP 80-89 MM HG: CPT | Performed by: FAMILY MEDICINE

## 2023-06-21 PROCEDURE — 99214 OFFICE O/P EST MOD 30 MIN: CPT | Performed by: FAMILY MEDICINE

## 2023-06-21 PROCEDURE — 82947 ASSAY GLUCOSE BLOOD QUANT: CPT | Performed by: FAMILY MEDICINE

## 2023-06-21 PROCEDURE — 3075F SYST BP GE 130 - 139MM HG: CPT | Performed by: FAMILY MEDICINE

## 2023-06-21 ASSESSMENT — ENCOUNTER SYMPTOMS
CHEST TIGHTNESS: 0
SHORTNESS OF BREATH: 0

## 2023-06-21 NOTE — PROGRESS NOTES
Coby Carroll is a 63 y.o. male (: 1960) presenting to address:    Chief Complaint   Patient presents with    Diabetes       Vitals:    23 1531   BP: 132/84   Pulse: 76   Resp: 16   Temp: 98.1 °F (36.7 °C)   SpO2: 95%       Coordination of Care Questionaire:   1. \"Have you been to the ER, urgent care clinic since your last visit? Hospitalized since your last visit? \" No    2. \"Have you seen or consulted any other health care providers outside of the [address] [place] [address] since your last visit? \" No     3. For patients aged 39-70: Has the patient had a colonoscopy / FIT/ Cologuard? Yes - no Care Gap present      If the patient is female:    4. For patients aged 41-77: Has the patient had a mammogram within the past 2 years? NA - based on age or sex      11. For patients aged 21-65: Has the patient had a pap smear? NA - based on age or sex    Advanced Directive:   1. Do you have an Advanced Directive? No    2. Would you like information on Advanced Directives?  No

## 2023-06-21 NOTE — PATIENT INSTRUCTIONS
Current Status:  Type 2 diabetes improved control with episodic hypoglycemia  Hypertension adequately controlled  Recent unexplained weight loss-weight stable    Health Maintenance Recommendations:  COVID-19 immunization with bivalent vaccine  Diabetic eye exam  Routine HIV screening    Plan:  Discontinue insulin  Continue Jardiance and metformin  Begin semaglutide 0.25 mg subcu weekly x4 weeks then increase to 0.5 mg subcu weekly-continue current as needed insulin regimen until semaglutide has been obtained  Return for follow-up with in office hemoglobin A1c in 2 months, return sooner with any problems  Schedule lab appointment followed by annual physical exam appointment after 1/12/2024

## 2023-06-21 NOTE — PROGRESS NOTES
HISTORY OF PRESENT ILLNESS  Ava Edwards  is a 63 y.o. y.o. male    He returns for follow-up after evaluation 1 month ago with inadequately controlled type 2 diabetes and an elevated blood pressure resulting in the following assessment and plan:    Current Status:    Diabetic control much improved  Nocturnal leg cramps nearly resolved  Now with elevated BP     Health Maintenance Recommendations:  COVID-19 immunization with bivalent vaccine  Diabetic eye exam  HIV screening with next routine lab draw     Plan:  Continue all current medications including 70/30 insulin 20 units twice daily after checking glucose levels, metformin  mg, 2 tablets daily and Jardiance 25 mg daily  Begin losartan 25 mg daily  Return for follow-up in 3 weeks or sooner with any problems    He reports some hypoglycemic episodes even when not administering insulin for glucose levels at 120. Because of that he is raise to limit to 135 and now rarely needs to administer the insulin however his baseline glucose levels are still suboptimal.  Mr#: [id number]      Past Medical History:   Diagnosis Date    Bulging lumbar disc June 2014    L5/S1 (seee MRI June 2014); Small non-impinging left posterior disc protrusion L5/S1,    Duodenitis 05/09/2019    Noted on EGD 5/9/19 - Dr. Enedelia Mcclain    GERD (gastroesophageal reflux disease)     Uses PRN OTC Prilosec, doing well with low acid foods    Low back pain 5/29/2014    Obesity 07/01/2015    Prediabetes     HbA1C 5.9% 12/15    Right-sided low back pain with right-sided sciatica 07/01/2015    Anticipating surgery 5/30/17 stenosis S1, DDD  L4-S1 with fusion of L5 & S1    Sleep apnea     Has CPAP    Spinal stenosis June 2014    Lumbar spine MRI with mild degenerative spondylosis without high-grade stenosis.     Vitamin D deficiency 2/17/2018 2/14/18:  Vit D 15 --> start OTC Vit D3 5000 units       Past Surgical History:   Procedure Laterality Date    ANTERIOR CRUCIATE LIGAMENT REPAIR  2004

## 2023-06-23 ENCOUNTER — TELEPHONE (OUTPATIENT)
Dept: FAMILY MEDICINE CLINIC | Facility: CLINIC | Age: 63
End: 2023-06-23

## 2023-06-23 DIAGNOSIS — E11.65 TYPE 2 DIABETES MELLITUS WITH HYPERGLYCEMIA, WITH LONG-TERM CURRENT USE OF INSULIN (HCC): ICD-10-CM

## 2023-06-23 DIAGNOSIS — Z79.4 TYPE 2 DIABETES MELLITUS WITH HYPERGLYCEMIA, WITH LONG-TERM CURRENT USE OF INSULIN (HCC): ICD-10-CM

## 2023-06-23 DIAGNOSIS — E11.65 TYPE 2 DIABETES MELLITUS WITH HYPERGLYCEMIA, WITHOUT LONG-TERM CURRENT USE OF INSULIN (HCC): Primary | ICD-10-CM

## 2023-06-23 RX ORDER — BLOOD-GLUCOSE METER
KIT MISCELLANEOUS
Qty: 100 EACH | Refills: 3 | Status: SHIPPED | OUTPATIENT
Start: 2023-06-23

## 2023-06-23 RX ORDER — LANCETS 28 GAUGE
EACH MISCELLANEOUS
Qty: 100 EACH | Refills: 3 | Status: SHIPPED | OUTPATIENT
Start: 2023-06-23

## 2023-06-23 NOTE — TELEPHONE ENCOUNTER
Last refill sent to local 5/15/23 for 100 with 3 refills. Patient is requesting to Mail order .  Last Ov 6/21/23   Future Appointments   Date Time Provider [place]   7/26/2023  8:00 AM Nilda Mcleod MD [facility] AMB

## 2023-06-23 NOTE — TELEPHONE ENCOUNTER
Prescription sent for Trulicity, 2.55 mg by subcutaneous injection weekly.   Should stay on the same dose until follow-up and not plan on increase after 1 month as was the case with Ozempic

## 2023-08-15 DIAGNOSIS — I10 PRIMARY HYPERTENSION: ICD-10-CM

## 2023-08-15 DIAGNOSIS — Z79.4 TYPE 2 DIABETES MELLITUS WITH HYPERGLYCEMIA, WITH LONG-TERM CURRENT USE OF INSULIN (HCC): ICD-10-CM

## 2023-08-15 DIAGNOSIS — E11.65 TYPE 2 DIABETES MELLITUS WITH HYPERGLYCEMIA, WITH LONG-TERM CURRENT USE OF INSULIN (HCC): ICD-10-CM

## 2023-08-15 RX ORDER — LOSARTAN POTASSIUM 25 MG/1
25 TABLET ORAL DAILY
Qty: 90 TABLET | Refills: 3 | Status: SHIPPED | OUTPATIENT
Start: 2023-08-15

## 2023-08-15 NOTE — TELEPHONE ENCOUNTER
Last refilled 5/23/23 for 90 with 3 refills. To local . [facility].  Last ov 6/21/23   Future Appointments   Date Time Provider [address] [place] [address]   8/21/2023  8:30 AM Cali Staton MD [facility]

## 2023-08-18 ASSESSMENT — ENCOUNTER SYMPTOMS
CHEST TIGHTNESS: 0
SHORTNESS OF BREATH: 0

## 2023-08-18 NOTE — PROGRESS NOTES
HISTORY OF PRESENT ILLNESS  Cleveland Fowler  is a 66 y.o. y.o. male    He returns for follow-up after evaluation 2 months ago. He had been started on 70/30 insulin after developing a dramatic decrease in diabetic control with weight loss. At his evaluation 2 months ago he reported that he had begun to experience hypoglycemic episodes. Weight loss had stabilized. He was advised to discontinue insulin, continue Jardiance and metformin and begin semaglutide 0.25 mg subcutaneously on a weekly basis x4 weeks with plans to increase to 0.5 mg subcu weekly after the first 4 weeks. It was subsequently determined that semaglutide was not covered by his insurance and he was instead started on dulaglutide (Trulicity) 8.77 mg subcu weekly. Today he reports having developed significant volar left arm pain which seems to radiate to the forearm with lifting (contraction of the biceps muscle) he has lifted weights in the past but does not recall any recent injury. He indicates that this is starting to develop in the right arm as well. Mr#: [id number]      Past Medical History:   Diagnosis Date    Bulging lumbar disc June 2014    L5/S1 (seee MRI June 2014); Small non-impinging left posterior disc protrusion L5/S1,    Duodenitis 05/09/2019    Noted on EGD 5/9/19 - Dr. Tamia Jeter    GERD (gastroesophageal reflux disease)     Uses PRN OTC Prilosec, doing well with low acid foods    Low back pain 5/29/2014    Obesity 07/01/2015    Prediabetes     HbA1C 5.9% 12/15    Right-sided low back pain with right-sided sciatica 07/01/2015    Anticipating surgery 5/30/17 stenosis S1, DDD  L4-S1 with fusion of L5 & S1    Sleep apnea     Has CPAP    Spinal stenosis June 2014    Lumbar spine MRI with mild degenerative spondylosis without high-grade stenosis.     Vitamin D deficiency 2/17/2018 2/14/18:  Vit D 15 --> start OTC Vit D3 5000 units       Past Surgical History:   Procedure Laterality Date    ANTERIOR CRUCIATE LIGAMENT REPAIR

## 2023-08-21 ENCOUNTER — OFFICE VISIT (OUTPATIENT)
Dept: FAMILY MEDICINE CLINIC | Facility: CLINIC | Age: 63
End: 2023-08-21
Payer: OTHER GOVERNMENT

## 2023-08-21 VITALS
SYSTOLIC BLOOD PRESSURE: 120 MMHG | OXYGEN SATURATION: 95 % | RESPIRATION RATE: 16 BRPM | BODY MASS INDEX: 31.7 KG/M2 | WEIGHT: 214 LBS | TEMPERATURE: 98.1 F | HEIGHT: 69 IN | DIASTOLIC BLOOD PRESSURE: 80 MMHG | HEART RATE: 52 BPM

## 2023-08-21 DIAGNOSIS — M79.601 BILATERAL ARM PAIN: ICD-10-CM

## 2023-08-21 DIAGNOSIS — I10 PRIMARY HYPERTENSION: ICD-10-CM

## 2023-08-21 DIAGNOSIS — E11.65 TYPE 2 DIABETES MELLITUS WITH HYPERGLYCEMIA, WITHOUT LONG-TERM CURRENT USE OF INSULIN (HCC): Primary | ICD-10-CM

## 2023-08-21 DIAGNOSIS — E55.9 VITAMIN D DEFICIENCY: ICD-10-CM

## 2023-08-21 DIAGNOSIS — E11.9 TYPE 2 DIABETES MELLITUS WITHOUT COMPLICATION, WITHOUT LONG-TERM CURRENT USE OF INSULIN (HCC): ICD-10-CM

## 2023-08-21 DIAGNOSIS — M79.602 BILATERAL ARM PAIN: ICD-10-CM

## 2023-08-21 DIAGNOSIS — Z00.00 ROUTINE HEALTH MAINTENANCE: Primary | ICD-10-CM

## 2023-08-21 LAB — HBA1C MFR BLD: 6.2 %

## 2023-08-21 PROCEDURE — 3079F DIAST BP 80-89 MM HG: CPT | Performed by: FAMILY MEDICINE

## 2023-08-21 PROCEDURE — 83036 HEMOGLOBIN GLYCOSYLATED A1C: CPT | Performed by: FAMILY MEDICINE

## 2023-08-21 PROCEDURE — 99213 OFFICE O/P EST LOW 20 MIN: CPT | Performed by: FAMILY MEDICINE

## 2023-08-21 PROCEDURE — 3074F SYST BP LT 130 MM HG: CPT | Performed by: FAMILY MEDICINE

## 2023-08-21 NOTE — PATIENT INSTRUCTIONS
Health Maintenance Recommendations:  COVID-19 immunization with bivalent vaccine  Influenza immunization recommended later this fall  Diabetic eye exam  Routine HIV screening    Plan:  Continue current medications  Avoid dietary salt, starch and sugar and as much as possible follow program of regular aerobic exercise. Orthopedic surgery referral with regard to bilateral arm pain  Schedule fasting lab appointment followed by annual physical exam appointment after 1/12/2024.   Return sooner with any problems

## 2023-10-05 DIAGNOSIS — E11.65 TYPE 2 DIABETES MELLITUS WITH HYPERGLYCEMIA, WITHOUT LONG-TERM CURRENT USE OF INSULIN (HCC): ICD-10-CM

## 2023-10-06 RX ORDER — DULAGLUTIDE 0.75 MG/.5ML
INJECTION, SOLUTION SUBCUTANEOUS
Qty: 4 ML | Refills: 5 | OUTPATIENT
Start: 2023-10-06

## 2023-10-06 NOTE — TELEPHONE ENCOUNTER
Mary Nails called for their medication refill.     Last Office visit:  8/21/2023    Last Filled: 6/23/2023; Qty 8 w/ 1 refill     Follow up visit:    Future Appointments   Date Time Provider [address]   1/8/2024  7:40 AM LAB_[place] BOYLE [facility] AM[facility]   1/15/2024  7:30 AM MD [place] Rod [facility] AMB

## 2023-11-09 RX ORDER — ERGOCALCIFEROL 1.25 MG/1
1 CAPSULE ORAL
Qty: 12 CAPSULE | Refills: 3 | Status: SHIPPED | OUTPATIENT
Start: 2023-11-09

## 2023-11-09 NOTE — TELEPHONE ENCOUNTER
Last refilled 1/12/23 for 12 with 4 refills. Last ov switching script to [facility] .   Future Appointments   Date Time Provider Department Center   1/8/2024  7:40 AM [facility]   1/15/2024  7:30 AM Jax Vail MD [facility] AMB

## 2023-12-13 NOTE — TELEPHONE ENCOUNTER
Last refilled 1/13/23 for 180 with 3 refills.  Last ov 8/21/23   Future Appointments   Date Time Provider [address]   1/8/2024  7:40 AM [facility]   1/15/2024  7:30 AM Hao Rosas MD [facility] AMB

## 2023-12-14 RX ORDER — METFORMIN HYDROCHLORIDE 500 MG/1
TABLET, EXTENDED RELEASE ORAL
Qty: 200 TABLET | Refills: 1 | Status: SHIPPED | OUTPATIENT
Start: 2023-12-14

## 2024-01-22 ENCOUNTER — HOSPITAL ENCOUNTER (OUTPATIENT)
Facility: HOSPITAL | Age: 64
Setting detail: SPECIMEN
Discharge: HOME | End: 2024-01-25
Payer: OTHER GOVERNMENT

## 2024-01-22 DIAGNOSIS — E11.9 TYPE 2 DIABETES MELLITUS WITHOUT COMPLICATION, WITHOUT LONG-TERM CURRENT USE OF INSULIN (HCC): ICD-10-CM

## 2024-01-22 DIAGNOSIS — I10 PRIMARY HYPERTENSION: ICD-10-CM

## 2024-01-22 DIAGNOSIS — E55.9 VITAMIN D DEFICIENCY: ICD-10-CM

## 2024-01-22 DIAGNOSIS — Z00.00 ROUTINE HEALTH MAINTENANCE: ICD-10-CM

## 2024-01-22 LAB
25(OH)D3 SERPL-MCNC: 76.6 NG/ML (ref 30–100)
ALBUMIN SERPL-MCNC: 4.1 G/DL (ref 3.4–5)
ALBUMIN/GLOB SERPL: 1.5 (ref 0.8–1.7)
ALP SERPL-CCNC: 83 U/L (ref 45–117)
ALT SERPL-CCNC: 41 U/L (ref 16–61)
ANION GAP SERPL CALC-SCNC: 2 MMOL/L (ref 3–18)
APPEARANCE UR: CLEAR
AST SERPL-CCNC: 24 U/L (ref 10–38)
BASOPHILS # BLD: 0.1 K/UL (ref 0–0.1)
BASOPHILS NFR BLD: 1 % (ref 0–2)
BILIRUB SERPL-MCNC: 0.6 MG/DL (ref 0.2–1)
BILIRUB UR QL: NEGATIVE
BUN SERPL-MCNC: 15 MG/DL (ref 7–18)
BUN/CREAT SERPL: 14 (ref 12–20)
CALCIUM SERPL-MCNC: 9 MG/DL (ref 8.5–10.1)
CHLORIDE SERPL-SCNC: 104 MMOL/L (ref 100–111)
CHOLEST SERPL-MCNC: 104 MG/DL
CO2 SERPL-SCNC: 33 MMOL/L (ref 21–32)
COLOR UR: YELLOW
CREAT SERPL-MCNC: 1.08 MG/DL (ref 0.6–1.3)
CREAT UR-MCNC: 141 MG/DL (ref 30–125)
DIFFERENTIAL METHOD BLD: ABNORMAL
EOSINOPHIL # BLD: 0.1 K/UL (ref 0–0.4)
EOSINOPHIL NFR BLD: 2 % (ref 0–5)
ERYTHROCYTE [DISTWIDTH] IN BLOOD BY AUTOMATED COUNT: 12.2 % (ref 11.6–14.5)
EST. AVERAGE GLUCOSE BLD GHB EST-MCNC: 120 MG/DL
GLOBULIN SER CALC-MCNC: 2.8 G/DL (ref 2–4)
GLUCOSE SERPL-MCNC: 99 MG/DL (ref 74–99)
GLUCOSE UR STRIP.AUTO-MCNC: >1000 MG/DL
HBA1C MFR BLD: 5.8 % (ref 4.2–5.6)
HCT VFR BLD AUTO: 52.4 % (ref 36–48)
HDLC SERPL-MCNC: 29 MG/DL (ref 40–60)
HDLC SERPL: 3.6 (ref 0–5)
HGB BLD-MCNC: 17.2 G/DL (ref 13–16)
HGB UR QL STRIP: NEGATIVE
IMM GRANULOCYTES # BLD AUTO: 0 K/UL (ref 0–0.04)
IMM GRANULOCYTES NFR BLD AUTO: 0 % (ref 0–0.5)
KETONES UR QL STRIP.AUTO: NEGATIVE MG/DL
LDLC SERPL CALC-MCNC: 53.2 MG/DL (ref 0–100)
LEUKOCYTE ESTERASE UR QL STRIP.AUTO: NEGATIVE
LIPID PANEL: ABNORMAL
LYMPHOCYTES # BLD: 2 K/UL (ref 0.9–3.6)
LYMPHOCYTES NFR BLD: 28 % (ref 21–52)
MCH RBC QN AUTO: 32 PG (ref 24–34)
MCHC RBC AUTO-ENTMCNC: 32.8 G/DL (ref 31–37)
MCV RBC AUTO: 97.6 FL (ref 78–100)
MICROALBUMIN UR-MCNC: 0.5 MG/DL (ref 0–3)
MICROALBUMIN/CREAT UR-RTO: 4 MG/G (ref 0–30)
MONOCYTES # BLD: 0.8 K/UL (ref 0.05–1.2)
MONOCYTES NFR BLD: 11 % (ref 3–10)
NEUTS SEG # BLD: 4 K/UL (ref 1.8–8)
NEUTS SEG NFR BLD: 58 % (ref 40–73)
NITRITE UR QL STRIP.AUTO: NEGATIVE
NRBC # BLD: 0 K/UL (ref 0–0.01)
NRBC BLD-RTO: 0 PER 100 WBC
PH UR STRIP: 6 (ref 5–8)
PLATELET # BLD AUTO: 166 K/UL (ref 135–420)
PMV BLD AUTO: 11.6 FL (ref 9.2–11.8)
POTASSIUM SERPL-SCNC: 4.1 MMOL/L (ref 3.5–5.5)
PROT SERPL-MCNC: 6.9 G/DL (ref 6.4–8.2)
PROT UR STRIP-MCNC: NEGATIVE MG/DL
PSA SERPL-MCNC: 1.8 NG/ML (ref 0–4)
RBC # BLD AUTO: 5.37 M/UL (ref 4.35–5.65)
SODIUM SERPL-SCNC: 139 MMOL/L (ref 136–145)
SP GR UR REFRACTOMETRY: >1.03 (ref 1–1.03)
TRIGL SERPL-MCNC: 109 MG/DL
TSH SERPL DL<=0.05 MIU/L-ACNC: 1.51 UIU/ML (ref 0.36–3.74)
UROBILINOGEN UR QL STRIP.AUTO: 0.2 EU/DL (ref 0.2–1)
VLDLC SERPL CALC-MCNC: 21.8 MG/DL
WBC # BLD AUTO: 7 K/UL (ref 4.6–13.2)

## 2024-01-22 PROCEDURE — 80061 LIPID PANEL: CPT

## 2024-01-22 PROCEDURE — 82043 UR ALBUMIN QUANTITATIVE: CPT

## 2024-01-22 PROCEDURE — 83036 HEMOGLOBIN GLYCOSYLATED A1C: CPT

## 2024-01-22 PROCEDURE — 82306 VITAMIN D 25 HYDROXY: CPT

## 2024-01-22 PROCEDURE — 84443 ASSAY THYROID STIM HORMONE: CPT

## 2024-01-22 PROCEDURE — 82570 ASSAY OF URINE CREATININE: CPT

## 2024-01-22 PROCEDURE — 36415 COLL VENOUS BLD VENIPUNCTURE: CPT

## 2024-01-22 PROCEDURE — 81003 URINALYSIS AUTO W/O SCOPE: CPT

## 2024-01-22 PROCEDURE — G0103 PSA SCREENING: HCPCS

## 2024-01-22 PROCEDURE — 87389 HIV-1 AG W/HIV-1&-2 AB AG IA: CPT

## 2024-01-22 PROCEDURE — 80053 COMPREHEN METABOLIC PANEL: CPT

## 2024-01-22 PROCEDURE — 85025 COMPLETE CBC W/AUTO DIFF WBC: CPT

## 2024-01-23 LAB
HIV 1+2 AB+HIV1 P24 AG SERPL QL IA: NONREACTIVE
HIV 1/2 RESULT COMMENT: NORMAL

## 2024-01-29 ASSESSMENT — ENCOUNTER SYMPTOMS
ABDOMINAL PAIN: 0
ANAL BLEEDING: 0
DIARRHEA: 0
CONSTIPATION: 0
NAUSEA: 0
CHEST TIGHTNESS: 0
BLOOD IN STOOL: 0
WHEEZING: 0
VOMITING: 0
SORE THROAT: 0
SHORTNESS OF BREATH: 0

## 2024-01-29 NOTE — PROGRESS NOTES
HISTORY OF PRESENT ILLNESS  Mike Patel  is a 63 y.o. y.o. male    He presents for health assessment, preventative care and follow-up with a history of type 2 diabetes, vitamin D deficiency,  obesity and GERD symptoms followed by gastroenterology        Mr#: [id number]      Past Medical History:   Diagnosis Date    Bulging lumbar disc June 2014    L5/S1 (seee MRI June 2014); Small non-impinging left posterior disc protrusion L5/S1,    Duodenitis 05/09/2019    Noted on EGD 5/9/19 - Dr. Doss    GERD (gastroesophageal reflux disease)     Uses PRN OTC Prilosec, doing well with low acid foods    Low back pain 5/29/2014    Obesity 07/01/2015    Prediabetes     HbA1C 5.9% 12/15    Right-sided low back pain with right-sided sciatica 07/01/2015    Anticipating surgery 5/30/17 stenosis S1, DDD  L4-S1 with fusion of L5 & S1    Sleep apnea     Has CPAP    Spinal stenosis June 2014    Lumbar spine MRI with mild degenerative spondylosis without high-grade stenosis.    Vitamin D deficiency 2/17/2018 2/14/18:  Vit D 15 --> start OTC Vit D3 5000 units       Past Surgical History:   Procedure Laterality Date    ANTERIOR CRUCIATE LIGAMENT REPAIR  2004    COLONOSCOPY  03/01/2021    No significant mucosal lesions, 10-year follow-up, Dr. Rolle    LUMBAR LAMINECTOMY  05/30/2017    [facility] - Dr. Salter     UPPER GASTROINTESTINAL ENDOSCOPY N/A 05/09/2019    Dr. DESIREE Doss, [facility] dysphagia, [place], report in [facility]       Family History   Problem Relation Age of Onset    Diabetes Maternal Grandfather        Allergies   Allergen Reactions    Pregabalin Other (See Comments)     Weight gain       Social History     Tobacco Use   Smoking Status Never   Smokeless Tobacco Never       Social History     Substance and Sexual Activity   Alcohol Use Yes    Alcohol/week: 4.0 standard drinks of alcohol    Types: 4 Shots of liquor per week       Immunization History   Administered Date(s) Administered    Anthrax (BioThrax)

## 2024-01-30 ENCOUNTER — OFFICE VISIT (OUTPATIENT)
Dept: FAMILY MEDICINE CLINIC | Facility: CLINIC | Age: 64
End: 2024-01-30
Payer: OTHER GOVERNMENT

## 2024-01-30 VITALS
HEIGHT: 69 IN | OXYGEN SATURATION: 94 % | RESPIRATION RATE: 16 BRPM | DIASTOLIC BLOOD PRESSURE: 70 MMHG | HEART RATE: 62 BPM | WEIGHT: 224 LBS | SYSTOLIC BLOOD PRESSURE: 120 MMHG | TEMPERATURE: 97.3 F | BODY MASS INDEX: 33.18 KG/M2

## 2024-01-30 DIAGNOSIS — K21.9 GASTROESOPHAGEAL REFLUX DISEASE, UNSPECIFIED WHETHER ESOPHAGITIS PRESENT: ICD-10-CM

## 2024-01-30 DIAGNOSIS — Z00.00 ROUTINE GENERAL MEDICAL EXAMINATION AT A HEALTH CARE FACILITY: Primary | ICD-10-CM

## 2024-01-30 DIAGNOSIS — E55.9 VITAMIN D DEFICIENCY: ICD-10-CM

## 2024-01-30 PROCEDURE — 3078F DIAST BP <80 MM HG: CPT | Performed by: FAMILY MEDICINE

## 2024-01-30 PROCEDURE — 99396 PREV VISIT EST AGE 40-64: CPT | Performed by: FAMILY MEDICINE

## 2024-01-30 PROCEDURE — 3074F SYST BP LT 130 MM HG: CPT | Performed by: FAMILY MEDICINE

## 2024-01-30 ASSESSMENT — PATIENT HEALTH QUESTIONNAIRE - PHQ9
SUM OF ALL RESPONSES TO PHQ QUESTIONS 1-9: 0
SUM OF ALL RESPONSES TO PHQ QUESTIONS 1-9: 0
2. FEELING DOWN, DEPRESSED OR HOPELESS: 0
SUM OF ALL RESPONSES TO PHQ QUESTIONS 1-9: 0
SUM OF ALL RESPONSES TO PHQ9 QUESTIONS 1 & 2: 0
1. LITTLE INTEREST OR PLEASURE IN DOING THINGS: 0
SUM OF ALL RESPONSES TO PHQ QUESTIONS 1-9: 0

## 2024-01-30 ASSESSMENT — ENCOUNTER SYMPTOMS: COUGH: 1

## 2024-01-30 NOTE — PROGRESS NOTES
Mike Patel is a 63 y.o. male (: 1960) presenting to address:    Chief Complaint   Patient presents with    Diabetes    Sleep Apnea     Needs a Mask for cpap because he sleeps with his mouth open .        Vitals:    24 0755   BP: 120/70   Pulse: 62   Resp: 16   Temp: 97.3 °F (36.3 °C)   SpO2: 94%       \"Have you been to the ER, urgent care clinic since your last visit?  Hospitalized since your last visit?\"    NO    “Have you seen or consulted any other health care providers outside of [facility] since your last visit?”    NO

## 2024-01-30 NOTE — PATIENT INSTRUCTIONS
Current Status:  Type 2 diabetes well-controlled  Satisfactory vitamin D level  GERD symptoms adequately managed    Health Maintenance Recommendations:  Keep current with COVID-19 immunization guidelines  Consider the PCV-20 pneumococcal immunization  Diabetic eye exam reported as current    Plan:  Discontinue  metformin  Continue all other prescribed medications  Avoid dietary starch and sugar and as much as possible follow program of regular aerobic exercise.  Return for follow-up with an in office hemoglobin A1c in 6 months, return sooner with any problems  Please always arrive at least 15 minutes before your scheduled appointment time.

## 2024-02-06 DIAGNOSIS — G47.33 OBSTRUCTIVE SLEEP APNEA: Primary | ICD-10-CM

## 2024-02-19 NOTE — PATIENT INSTRUCTIONS
Current Status:    Significant problem with nocturnal leg cramps which occur at least once nightly and sometimes multiple times nightly causing severe pain    Health Maintenance Recommendations:  COVID-19 immunization with bivalent vaccine  Diabetic eye exam  HIV screen    Plan:  Recommend is much daily aerobic exercise as possible but not stretching at nighttime  Would recommend considering a stand-up desk at work  Review comprehensive information provided and report back with interested in any particular treatment suggested.   Return for lab appointment as scheduled on 4/26/2023 and office follow-up as scheduled on 7/26/2023, return sooner with any problems
Sunscreen Recommendations: Discussed sunblock should be applied to exposed areas daily, and be reapplied every two hours while exposed . The sunblock should be broad spectrum SPF-50, UVA/UVB and contain Zinc and Titanium Dioxide(Blue Lizard & Sun Bum are some good OTC brands). Strongly recommend Elta MD products. Recommend sun protective clothing such as long sleeve UPF protective shirts, wide brim hats, neck covers and sunglasses as it has been proven to prevent further photo damage from the sun.
Detail Level: Zone
Detail Level: Detailed

## 2024-03-23 DIAGNOSIS — E11.65 TYPE 2 DIABETES MELLITUS WITH HYPERGLYCEMIA, WITHOUT LONG-TERM CURRENT USE OF INSULIN (HCC): ICD-10-CM

## 2024-03-25 RX ORDER — DULAGLUTIDE 0.75 MG/.5ML
INJECTION, SOLUTION SUBCUTANEOUS
Qty: 6 ML | Refills: 3 | Status: SHIPPED | OUTPATIENT
Start: 2024-03-25

## 2024-03-25 NOTE — TELEPHONE ENCOUNTER
Last refilled 10/6/23 for 12 with 1 . Last ov 1/30/24   Future Appointments   Date Time Provider Department Center   7/29/2024  7:30 AM Jax Vail MD [facility] AMB

## 2024-04-18 DIAGNOSIS — E11.65 TYPE 2 DIABETES MELLITUS WITH HYPERGLYCEMIA, WITH LONG-TERM CURRENT USE OF INSULIN (HCC): ICD-10-CM

## 2024-04-18 DIAGNOSIS — Z79.4 TYPE 2 DIABETES MELLITUS WITH HYPERGLYCEMIA, WITH LONG-TERM CURRENT USE OF INSULIN (HCC): ICD-10-CM

## 2024-04-19 RX ORDER — EMPAGLIFLOZIN 25 MG/1
25 TABLET, FILM COATED ORAL DAILY
Qty: 90 TABLET | Refills: 3 | Status: SHIPPED | OUTPATIENT
Start: 2024-04-19

## 2024-04-19 NOTE — TELEPHONE ENCOUNTER
Last visit: 1/30/24  Next visit:   Future Appointments   Date Time Provider Department Center   7/29/2024  7:30 AM Jax Vail MD [facility] AMB     Last filled: 6/12/23; jardiance 25 mg tab; qty 90 w/2 refills

## 2024-04-19 NOTE — TELEPHONE ENCOUNTER
Last visit: 1/30/24  Next visit:   Future Appointments   Date Time Provider Department Center   7/29/2024  7:30 AM Jax Vail MD [facility] AMB     Last filled: 6/23/23, medication dose change

## 2024-07-09 ENCOUNTER — OFFICE VISIT (OUTPATIENT)
Dept: FAMILY MEDICINE CLINIC | Facility: CLINIC | Age: 64
End: 2024-07-09

## 2024-07-09 VITALS — TEMPERATURE: 97.7 F | SYSTOLIC BLOOD PRESSURE: 110 MMHG | DIASTOLIC BLOOD PRESSURE: 62 MMHG

## 2024-07-09 DIAGNOSIS — Z01.30 BLOOD PRESSURE CHECK: Primary | ICD-10-CM

## 2024-07-09 NOTE — PROGRESS NOTES
Patient presented for blood pressure check d/t not feeling like himself, no c/o symptoms; pt's blood pressure was taken and recorded: 110/62; PCP notified; pt states will keep appt scheduled:     Future Appointments   Date Time Provider Department Center   7/29/2024  7:30 AM Jax Vail MD [facility] AMB

## 2024-07-29 ENCOUNTER — OFFICE VISIT (OUTPATIENT)
Dept: FAMILY MEDICINE CLINIC | Facility: CLINIC | Age: 64
End: 2024-07-29
Payer: OTHER GOVERNMENT

## 2024-07-29 VITALS
RESPIRATION RATE: 16 BRPM | HEIGHT: 69 IN | OXYGEN SATURATION: 96 % | HEART RATE: 50 BPM | BODY MASS INDEX: 34.36 KG/M2 | DIASTOLIC BLOOD PRESSURE: 80 MMHG | WEIGHT: 232 LBS | SYSTOLIC BLOOD PRESSURE: 120 MMHG | TEMPERATURE: 97.3 F

## 2024-07-29 DIAGNOSIS — I10 PRIMARY HYPERTENSION: ICD-10-CM

## 2024-07-29 DIAGNOSIS — E11.9 TYPE 2 DIABETES MELLITUS WITHOUT COMPLICATION, WITHOUT LONG-TERM CURRENT USE OF INSULIN (HCC): ICD-10-CM

## 2024-07-29 DIAGNOSIS — E11.9 TYPE 2 DIABETES MELLITUS WITHOUT COMPLICATION, WITHOUT LONG-TERM CURRENT USE OF INSULIN (HCC): Primary | ICD-10-CM

## 2024-07-29 DIAGNOSIS — Z00.00 ROUTINE HEALTH MAINTENANCE: Primary | ICD-10-CM

## 2024-07-29 DIAGNOSIS — E55.9 VITAMIN D DEFICIENCY: ICD-10-CM

## 2024-07-29 DIAGNOSIS — E66.9 OBESITY (BMI 30.0-34.9): ICD-10-CM

## 2024-07-29 DIAGNOSIS — K21.9 GASTROESOPHAGEAL REFLUX DISEASE, UNSPECIFIED WHETHER ESOPHAGITIS PRESENT: ICD-10-CM

## 2024-07-29 LAB — HBA1C MFR BLD: 6.4 %

## 2024-07-29 PROCEDURE — 90471 IMMUNIZATION ADMIN: CPT | Performed by: FAMILY MEDICINE

## 2024-07-29 PROCEDURE — 3079F DIAST BP 80-89 MM HG: CPT | Performed by: FAMILY MEDICINE

## 2024-07-29 PROCEDURE — 83036 HEMOGLOBIN GLYCOSYLATED A1C: CPT | Performed by: FAMILY MEDICINE

## 2024-07-29 PROCEDURE — 3074F SYST BP LT 130 MM HG: CPT | Performed by: FAMILY MEDICINE

## 2024-07-29 PROCEDURE — 3044F HG A1C LEVEL LT 7.0%: CPT | Performed by: FAMILY MEDICINE

## 2024-07-29 PROCEDURE — 99213 OFFICE O/P EST LOW 20 MIN: CPT | Performed by: FAMILY MEDICINE

## 2024-07-29 PROCEDURE — 90677 PCV20 VACCINE IM: CPT | Performed by: FAMILY MEDICINE

## 2024-07-29 SDOH — ECONOMIC STABILITY: FOOD INSECURITY: WITHIN THE PAST 12 MONTHS, THE FOOD YOU BOUGHT JUST DIDN'T LAST AND YOU DIDN'T HAVE MONEY TO GET MORE.: NEVER TRUE

## 2024-07-29 SDOH — ECONOMIC STABILITY: FOOD INSECURITY: WITHIN THE PAST 12 MONTHS, YOU WORRIED THAT YOUR FOOD WOULD RUN OUT BEFORE YOU GOT MONEY TO BUY MORE.: NEVER TRUE

## 2024-07-29 NOTE — PROGRESS NOTES
Mike Patel is a 63 y.o. male (: 1960) presenting to address:    Chief Complaint   Patient presents with    Diabetes       Vitals:    24 0729   BP: 120/80   Pulse: 50   Resp: 16   Temp: 97.3 °F (36.3 °C)   SpO2: 96%       \"Have you been to the ER, urgent care clinic since your last visit?  Hospitalized since your last visit?\"    NO    “Have you seen or consulted any other health care providers outside of [facility] since your last visit?”    NO     PCV 20 Immunization/s administered 2024 by Gemma Magana LPN   Patient tolerated procedure well.  No reactions noted.          
problems    Jax Vail MD    Please Note:  This document has been produced using voice recognition software.  Unrecognized errors in transcription may be present.

## 2024-07-29 NOTE — PATIENT INSTRUCTIONS
Current Status:  Type 2 diabetes well-controlled with hemoglobin A1c of 6.4% compared with 5.8% 6 months ago  Hypertension well-controlled  GERD symptoms adequately managed with omeprazole  Satisfactory vitamin D level as of annual check 6 months ago    Health Maintenance Recommendations:  Influenza immunization recommended every fall  PCV-20 pneumococcal immunization  Diabetic eye exam up to date  Colorectal cancer screening due March 2031    Plan:  Continue Trulicity (dulaglutide) but increase the dose to 1.5 mg by subcutaneous injection weekly  Continue Jardiance 25 mg daily  Continue atorvastatin 40 mg daily  Continue losartan 25 mg daily  Continue omeprazole 20 mg daily  Continue ergocalciferol 50,000 international units weekly and cholecalciferol 2000 international units, 2 capsules daily  Continue albuterol inhaler and Spiriva Respimat as recommended by pulmonary medicine  Avoid dietary salt, starch and sugar and as much as possible follow program of regular aerobic exercise.  Please schedule fasting lab appointment followed by an annual physical exam appointment after 1/30/2024, return sooner with any problems

## 2024-10-11 DIAGNOSIS — Z79.4 TYPE 2 DIABETES MELLITUS WITH HYPERGLYCEMIA, WITH LONG-TERM CURRENT USE OF INSULIN (HCC): ICD-10-CM

## 2024-10-11 DIAGNOSIS — E11.65 TYPE 2 DIABETES MELLITUS WITH HYPERGLYCEMIA, WITH LONG-TERM CURRENT USE OF INSULIN (HCC): ICD-10-CM

## 2024-10-11 DIAGNOSIS — I10 PRIMARY HYPERTENSION: ICD-10-CM

## 2024-10-14 RX ORDER — LOSARTAN POTASSIUM 25 MG/1
25 TABLET ORAL DAILY
Qty: 90 TABLET | Refills: 0 | Status: SHIPPED | OUTPATIENT
Start: 2024-10-14

## 2024-11-22 ENCOUNTER — OFFICE VISIT (OUTPATIENT)
Dept: FAMILY MEDICINE CLINIC | Facility: CLINIC | Age: 64
End: 2024-11-22
Payer: OTHER GOVERNMENT

## 2024-11-22 VITALS
TEMPERATURE: 97.9 F | BODY MASS INDEX: 33.47 KG/M2 | DIASTOLIC BLOOD PRESSURE: 80 MMHG | OXYGEN SATURATION: 96 % | HEIGHT: 69 IN | RESPIRATION RATE: 16 BRPM | WEIGHT: 226 LBS | HEART RATE: 53 BPM | SYSTOLIC BLOOD PRESSURE: 120 MMHG

## 2024-11-22 DIAGNOSIS — R05.8 COUGH PRODUCTIVE OF PURULENT SPUTUM: Primary | ICD-10-CM

## 2024-11-22 PROCEDURE — 3074F SYST BP LT 130 MM HG: CPT | Performed by: FAMILY MEDICINE

## 2024-11-22 PROCEDURE — 3079F DIAST BP 80-89 MM HG: CPT | Performed by: FAMILY MEDICINE

## 2024-11-22 PROCEDURE — 99213 OFFICE O/P EST LOW 20 MIN: CPT | Performed by: FAMILY MEDICINE

## 2024-11-22 RX ORDER — AZITHROMYCIN 250 MG/1
TABLET, FILM COATED ORAL
Qty: 6 TABLET | Refills: 0 | Status: SHIPPED | OUTPATIENT
Start: 2024-11-22 | End: 2024-12-02

## 2024-11-22 RX ORDER — BENZONATATE 200 MG/1
200 CAPSULE ORAL 3 TIMES DAILY PRN
Qty: 30 CAPSULE | Refills: 1 | Status: SHIPPED | OUTPATIENT
Start: 2024-11-22

## 2024-11-22 ASSESSMENT — ENCOUNTER SYMPTOMS
SORE THROAT: 0
SHORTNESS OF BREATH: 0
COUGH: 1
WHEEZING: 0

## 2024-11-22 NOTE — PATIENT INSTRUCTIONS
Current Status:  Persistent cough productive of purulent sputum for about 10 days, no fever or shortness of breath and does not feel ill.  Reports other family members with illnesses and cough.  Reports that chronic cough followed by pulmonary medicine has been well-controlled with current treatment      Plan:  Continue medication as prescribed by pulmonary medicine  Begin azithromycin 250 mg, 2 tablets today then 1 tablet daily x 4 more days  Benzonatate capsules 200 mg 3 times daily if needed for cough  Follow-up for new symptoms, worsening symptoms or failure to improve  Please schedule lab appointment followed by an annual physical exam appointment after 1/30/2025.  Return sooner with any problems.  Please always arrive at least 15 minutes before your scheduled appointment time.   The right coronary artery was selectively engaged, injected and visualized.

## 2024-11-22 NOTE — PROGRESS NOTES
Mike Patel is a 64 y.o. male (: 1960) presenting to address:    Chief Complaint   Patient presents with    Cough     Mucus is coming and goes into coughing fits    Immunizations     Had flu shot.        Vitals:    24 1331   BP: 120/80   Pulse: 53   Resp: 16   Temp: 97.9 °F (36.6 °C)   SpO2: 96%       \"Have you been to the ER, urgent care clinic since your last visit?  Hospitalized since your last visit?\"    NO    “Have you seen or consulted any other health care providers outside of [facility] since your last visit?”    NO           
insulin (HCC)    Obesity (BMI 30.0-34.9)    Nocturnal leg cramps    Primary hypertension         Current Outpatient Medications:     losartan (COZAAR) 25 MG tablet, TAKE 1 TABLET DAILY, Disp: 90 tablet, Rfl: 0    dulaglutide (TRULICITY) 1.5 MG/0.5ML SC injection, Inject 0.5 mLs into the skin once a week, Disp: 18 mL, Rfl: 3    JARDIANCE 25 MG tablet, TAKE 1 TABLET DAILY, Disp: 90 tablet, Rfl: 3    ergocalciferol (ERGOCALCIFEROL) 1.25 MG (08824 UT) capsule, Take 1 capsule by mouth every 7 days, Disp: 12 capsule, Rfl: 3    blood glucose test strips (FREESTYLE LITE) strip, Use for checking blood sugar levels up to 4 times daily E11.65, Disp: 100 each, Rfl: 3    FreeStyle Lancets MISC, Use for checking blood sugar levels up to 4 times daily E11.65, Disp: 100 each, Rfl: 3    atorvastatin (LIPITOR) 40 MG tablet, Take 1 tablet by mouth daily, Disp: 90 tablet, Rfl: 3    Blood Glucose Monitoring Suppl (PHARMACIST CHOICE AUTOCODE SYS) w/Device KIT, Use to check glucose level prior to administering insulin and anytime with symptoms of hypoglycemia E11.65, Disp: 1 kit, Rfl: 1    albuterol sulfate HFA (PROVENTIL;VENTOLIN;PROAIR) 108 (90 Base) MCG/ACT inhaler, Inhale 2 puffs into the lungs every 6 hours as needed, Disp: , Rfl:     SPIRIVA RESPIMAT 2.5 MCG/ACT AERS inhaler, Inhale 1 puff into the lungs daily, Disp: , Rfl:     fluticasone (FLONASE) 50 MCG/ACT nasal spray, 1 spray by Each Nostril route daily, Disp: , Rfl:     Cholecalciferol 50 MCG (2000 UT) CAPS, Take 2 capsules daily, Disp: , Rfl:     omeprazole (PRILOSEC) 20 MG delayed release capsule, TAKE 1 TABLET BY MOUTH DAILY., Disp: , Rfl:       Review of Systems   Constitutional:  Negative for fever.   HENT:  Negative for congestion, ear pain and sore throat.    Respiratory:  Positive for cough. Negative for shortness of breath and wheezing.    Musculoskeletal:  Negative for myalgias.   Neurological:  Negative for headaches.       /80   Pulse 53   Temp 97.9 °F

## 2024-12-20 RX ORDER — ERGOCALCIFEROL 1.25 MG/1
50000 CAPSULE, LIQUID FILLED ORAL
Qty: 12 CAPSULE | Refills: 3 | Status: SHIPPED | OUTPATIENT
Start: 2024-12-20

## 2025-02-07 ENCOUNTER — HOSPITAL ENCOUNTER (OUTPATIENT)
Facility: HOSPITAL | Age: 65
Setting detail: SPECIMEN
Discharge: HOME | End: 2025-02-10
Payer: OTHER GOVERNMENT

## 2025-02-07 ENCOUNTER — OFFICE VISIT (OUTPATIENT)
Dept: FAMILY MEDICINE CLINIC | Facility: CLINIC | Age: 65
End: 2025-02-07
Payer: OTHER GOVERNMENT

## 2025-02-07 VITALS
WEIGHT: 232 LBS | HEIGHT: 69 IN | HEART RATE: 64 BPM | BODY MASS INDEX: 34.36 KG/M2 | RESPIRATION RATE: 18 BRPM | TEMPERATURE: 98 F | SYSTOLIC BLOOD PRESSURE: 130 MMHG | DIASTOLIC BLOOD PRESSURE: 80 MMHG | OXYGEN SATURATION: 94 %

## 2025-02-07 DIAGNOSIS — Z00.00 ROUTINE HEALTH MAINTENANCE: ICD-10-CM

## 2025-02-07 DIAGNOSIS — M54.31 RIGHT SIDED SCIATICA: Primary | ICD-10-CM

## 2025-02-07 DIAGNOSIS — E11.9 TYPE 2 DIABETES MELLITUS WITHOUT COMPLICATION, WITHOUT LONG-TERM CURRENT USE OF INSULIN (HCC): ICD-10-CM

## 2025-02-07 DIAGNOSIS — E55.9 VITAMIN D DEFICIENCY: ICD-10-CM

## 2025-02-07 DIAGNOSIS — R05.1 ACUTE COUGH: ICD-10-CM

## 2025-02-07 DIAGNOSIS — I10 PRIMARY HYPERTENSION: ICD-10-CM

## 2025-02-07 DIAGNOSIS — M51.9 LUMBAR DISC DISEASE: ICD-10-CM

## 2025-02-07 LAB
25(OH)D3 SERPL-MCNC: 64.9 NG/ML (ref 30–100)
ALBUMIN SERPL-MCNC: 4.1 G/DL (ref 3.4–5)
ALBUMIN/GLOB SERPL: 1.4 (ref 0.8–1.7)
ALP SERPL-CCNC: 101 U/L (ref 45–117)
ALT SERPL-CCNC: 55 U/L (ref 16–61)
ANION GAP SERPL CALC-SCNC: 4 MMOL/L (ref 3–18)
APPEARANCE UR: CLEAR
AST SERPL-CCNC: 31 U/L (ref 10–38)
BASOPHILS # BLD: 0.04 K/UL (ref 0–0.1)
BASOPHILS NFR BLD: 0.9 % (ref 0–2)
BILIRUB SERPL-MCNC: 0.2 MG/DL (ref 0.2–1)
BILIRUB UR QL: NEGATIVE
BUN SERPL-MCNC: 13 MG/DL (ref 7–18)
BUN/CREAT SERPL: 12 (ref 12–20)
CALCIUM SERPL-MCNC: 8.7 MG/DL (ref 8.5–10.1)
CHLORIDE SERPL-SCNC: 106 MMOL/L (ref 100–111)
CHOLEST SERPL-MCNC: 125 MG/DL
CO2 SERPL-SCNC: 33 MMOL/L (ref 21–32)
COLOR UR: YELLOW
CREAT SERPL-MCNC: 1.06 MG/DL (ref 0.6–1.3)
CREAT UR-MCNC: 33 MG/DL (ref 30–125)
DIFFERENTIAL METHOD BLD: ABNORMAL
EOSINOPHIL # BLD: 0.07 K/UL (ref 0–0.4)
EOSINOPHIL NFR BLD: 1.5 % (ref 0–5)
ERYTHROCYTE [DISTWIDTH] IN BLOOD BY AUTOMATED COUNT: 12.8 % (ref 11.6–14.5)
EST. AVERAGE GLUCOSE BLD GHB EST-MCNC: 154 MG/DL
GLOBULIN SER CALC-MCNC: 2.9 G/DL (ref 2–4)
GLUCOSE SERPL-MCNC: 129 MG/DL (ref 74–99)
GLUCOSE UR STRIP.AUTO-MCNC: NEGATIVE MG/DL
HBA1C MFR BLD: 7 % (ref 4.2–5.6)
HCT VFR BLD AUTO: 50.9 % (ref 36–48)
HDLC SERPL-MCNC: 29 MG/DL (ref 40–60)
HDLC SERPL: 4.3 (ref 0–5)
HGB BLD-MCNC: 16.6 G/DL (ref 13–16)
HGB UR QL STRIP: NEGATIVE
IMM GRANULOCYTES # BLD AUTO: 0.02 K/UL (ref 0–0.04)
IMM GRANULOCYTES NFR BLD AUTO: 0.4 % (ref 0–0.5)
KETONES UR QL STRIP.AUTO: NEGATIVE MG/DL
LDLC SERPL CALC-MCNC: 63.4 MG/DL (ref 0–100)
LEUKOCYTE ESTERASE UR QL STRIP.AUTO: NEGATIVE
LIPID PANEL: ABNORMAL
LYMPHOCYTES # BLD: 0.73 K/UL (ref 0.9–3.6)
LYMPHOCYTES NFR BLD: 16 % (ref 21–52)
MCH RBC QN AUTO: 32.1 PG (ref 24–34)
MCHC RBC AUTO-ENTMCNC: 32.6 G/DL (ref 31–37)
MCV RBC AUTO: 98.5 FL (ref 78–100)
MICROALBUMIN UR-MCNC: <0.5 MG/DL (ref 0–3)
MICROALBUMIN/CREAT UR-RTO: NORMAL MG/G (ref 0–30)
MONOCYTES # BLD: 0.81 K/UL (ref 0.05–1.2)
MONOCYTES NFR BLD: 17.8 % (ref 3–10)
NEUTS SEG # BLD: 2.89 K/UL (ref 1.8–8)
NEUTS SEG NFR BLD: 63.4 % (ref 40–73)
NITRITE UR QL STRIP.AUTO: NEGATIVE
NRBC # BLD: 0 K/UL (ref 0–0.01)
NRBC BLD-RTO: 0 PER 100 WBC
PH UR STRIP: 6.5 (ref 5–8)
PLATELET # BLD AUTO: 149 K/UL (ref 135–420)
PMV BLD AUTO: 11.5 FL (ref 9.2–11.8)
POTASSIUM SERPL-SCNC: 4.4 MMOL/L (ref 3.5–5.5)
PROT SERPL-MCNC: 7 G/DL (ref 6.4–8.2)
PROT UR STRIP-MCNC: NEGATIVE MG/DL
PSA SERPL-MCNC: 1.7 NG/ML (ref 0–4)
RBC # BLD AUTO: 5.17 M/UL (ref 4.35–5.65)
SODIUM SERPL-SCNC: 143 MMOL/L (ref 136–145)
SP GR UR REFRACTOMETRY: 1.01 (ref 1–1.03)
TRIGL SERPL-MCNC: 163 MG/DL
TSH SERPL DL<=0.05 MIU/L-ACNC: 1.86 UIU/ML (ref 0.36–3.74)
UROBILINOGEN UR QL STRIP.AUTO: 0.2 EU/DL (ref 0.2–1)
VLDLC SERPL CALC-MCNC: 32.6 MG/DL
WBC # BLD AUTO: 4.6 K/UL (ref 4.6–13.2)

## 2025-02-07 PROCEDURE — 83036 HEMOGLOBIN GLYCOSYLATED A1C: CPT

## 2025-02-07 PROCEDURE — G0103 PSA SCREENING: HCPCS

## 2025-02-07 PROCEDURE — 80053 COMPREHEN METABOLIC PANEL: CPT

## 2025-02-07 PROCEDURE — 82306 VITAMIN D 25 HYDROXY: CPT

## 2025-02-07 PROCEDURE — 3079F DIAST BP 80-89 MM HG: CPT | Performed by: FAMILY MEDICINE

## 2025-02-07 PROCEDURE — 84443 ASSAY THYROID STIM HORMONE: CPT

## 2025-02-07 PROCEDURE — 81003 URINALYSIS AUTO W/O SCOPE: CPT

## 2025-02-07 PROCEDURE — 82570 ASSAY OF URINE CREATININE: CPT

## 2025-02-07 PROCEDURE — 99213 OFFICE O/P EST LOW 20 MIN: CPT | Performed by: FAMILY MEDICINE

## 2025-02-07 PROCEDURE — 82043 UR ALBUMIN QUANTITATIVE: CPT

## 2025-02-07 PROCEDURE — 3075F SYST BP GE 130 - 139MM HG: CPT | Performed by: FAMILY MEDICINE

## 2025-02-07 PROCEDURE — 85025 COMPLETE CBC W/AUTO DIFF WBC: CPT

## 2025-02-07 PROCEDURE — 36415 COLL VENOUS BLD VENIPUNCTURE: CPT

## 2025-02-07 PROCEDURE — 80061 LIPID PANEL: CPT

## 2025-02-07 RX ORDER — DEXAMETHASONE 1 MG
TABLET ORAL
Qty: 46 TABLET | Refills: 0 | Status: SHIPPED | OUTPATIENT
Start: 2025-02-07

## 2025-02-07 SDOH — ECONOMIC STABILITY: FOOD INSECURITY: WITHIN THE PAST 12 MONTHS, YOU WORRIED THAT YOUR FOOD WOULD RUN OUT BEFORE YOU GOT MONEY TO BUY MORE.: NEVER TRUE

## 2025-02-07 SDOH — ECONOMIC STABILITY: FOOD INSECURITY: WITHIN THE PAST 12 MONTHS, THE FOOD YOU BOUGHT JUST DIDN'T LAST AND YOU DIDN'T HAVE MONEY TO GET MORE.: NEVER TRUE

## 2025-02-07 SDOH — ECONOMIC STABILITY: INCOME INSECURITY: IN THE LAST 12 MONTHS, WAS THERE A TIME WHEN YOU WERE NOT ABLE TO PAY THE MORTGAGE OR RENT ON TIME?: NO

## 2025-02-07 SDOH — ECONOMIC STABILITY: TRANSPORTATION INSECURITY
IN THE PAST 12 MONTHS, HAS THE LACK OF TRANSPORTATION KEPT YOU FROM MEDICAL APPOINTMENTS OR FROM GETTING MEDICATIONS?: NO

## 2025-02-07 ASSESSMENT — PATIENT HEALTH QUESTIONNAIRE - PHQ9
SUM OF ALL RESPONSES TO PHQ QUESTIONS 1-9: 0
2. FEELING DOWN, DEPRESSED OR HOPELESS: NOT AT ALL
SUM OF ALL RESPONSES TO PHQ QUESTIONS 1-9: 0
SUM OF ALL RESPONSES TO PHQ QUESTIONS 1-9: 0
1. LITTLE INTEREST OR PLEASURE IN DOING THINGS: NOT AT ALL
SUM OF ALL RESPONSES TO PHQ9 QUESTIONS 1 & 2: 0
SUM OF ALL RESPONSES TO PHQ QUESTIONS 1-9: 0

## 2025-02-07 NOTE — PROGRESS NOTES
Mike Patel is a 64 y.o. male (: 1960) presenting to address:    Chief Complaint   Patient presents with    Cough     Started yesterday , runny nose.     Hip Pain     Right sided pain . Walking around the pain goes away .     Numbness     In right foot       Vitals:    25 1105   BP: 130/80   Pulse: 64   Resp: 18   Temp: 98 °F (36.7 °C)   SpO2: 94%       \"Have you been to the ER, urgent care clinic since your last visit?  Hospitalized since your last visit?\"    NO    “Have you seen or consulted any other health care providers outside of [facility] since your last visit?”    NO

## 2025-02-07 NOTE — PROGRESS NOTES
HISTORY OF PRESENT ILLNESS  Mike Patel  is a 64 y.o. y.o. male    He reports shooting pain in the right hip and thigh which improves with walking around and episodic numbness in the right leg.  He has a history of previous diagnosis with lumbar disc disease with laminectomy in 2019.  Subsequent to that he has presented with symptoms of right-sided low back pain with sciatica.        Mr#: [id number]      Past Medical History:   Diagnosis Date    Bulging lumbar disc June 2014    L5/S1 (seee MRI June 2014); Small non-impinging left posterior disc protrusion L5/S1,    Duodenitis 05/09/2019    Noted on EGD 5/9/19 - Dr. Doss    GERD (gastroesophageal reflux disease)     Uses PRN OTC Prilosec, doing well with low acid foods    Low back pain 5/29/2014    Obesity 07/01/2015    Prediabetes     HbA1C 5.9% 12/15    Right-sided low back pain with right-sided sciatica 07/01/2015    Anticipating surgery 5/30/17 stenosis S1, DDD  L4-S1 with fusion of L5 & S1    Sleep apnea     Has CPAP    Spinal stenosis June 2014    Lumbar spine MRI with mild degenerative spondylosis without high-grade stenosis.    Vitamin D deficiency 2/17/2018 2/14/18:  Vit D 15 --> start OTC Vit D3 5000 units       Past Surgical History:   Procedure Laterality Date    ANTERIOR CRUCIATE LIGAMENT REPAIR  2004    COLONOSCOPY  03/01/2021    No significant mucosal lesions, 10-year follow-up, Dr. Rolle    LUMBAR LAMINECTOMY  05/30/2017    [facility] - Dr. Salter     UPPER GASTROINTESTINAL ENDOSCOPY N/A 05/09/2019    Dr. DESIREE Doss, [facility] dysphagia, [place], report in [facility]       Family History   Problem Relation Age of Onset    Diabetes Maternal Grandfather        Allergies   Allergen Reactions    Pregabalin Other (See Comments)     Weight gain       Social History     Tobacco Use   Smoking Status Never   Smokeless Tobacco Never       Social History     Substance and Sexual Activity   Alcohol Use Yes    Alcohol/week: 2.0 standard drinks of

## 2025-02-07 NOTE — PATIENT INSTRUCTIONS
Current Status:  History of lumbar disc disease status post remote laminectomy, now with 5-week history of radicular symptoms on the right worse when supine, better with ambulation, episodic right foot pain  Developing cough again, this time with rhinorrhea, history of chronic cough followed by pulmonary medicine     Plan:  Dexamethasone 1 mg  Take 6 tablets daily in AM with food x 4 days, 4 tablets daily in AM with food x 4 days, 2 tablets daily in AM with food x 2 days, 1 tablet daily in AM with food x 2 days  Expect transient increase in glucose levels  OTC antihistamines, cough syrup, inhaler  Lab today  Please schedule annual physical exam appointment and follow-up in about 2 weeks.  Please always arrive at least 15 minutes before your scheduled appointment time.

## 2025-02-09 DIAGNOSIS — Z79.4 TYPE 2 DIABETES MELLITUS WITH HYPERGLYCEMIA, WITH LONG-TERM CURRENT USE OF INSULIN (HCC): ICD-10-CM

## 2025-02-09 DIAGNOSIS — E11.65 TYPE 2 DIABETES MELLITUS WITH HYPERGLYCEMIA, WITH LONG-TERM CURRENT USE OF INSULIN (HCC): ICD-10-CM

## 2025-02-09 DIAGNOSIS — I10 PRIMARY HYPERTENSION: ICD-10-CM

## 2025-02-10 RX ORDER — LOSARTAN POTASSIUM 25 MG/1
25 TABLET ORAL DAILY
Qty: 90 TABLET | Refills: 3 | Status: SHIPPED | OUTPATIENT
Start: 2025-02-10

## 2025-02-10 NOTE — TELEPHONE ENCOUNTER
Last refilled 10/14/24 for 90 with 0 refills. Last ov 2/7/25   Future Appointments   Date Time Provider Department Center   2/21/2025  9:30 AM Jax Vail MD [facility]

## 2025-02-14 ENCOUNTER — OFFICE VISIT (OUTPATIENT)
Dept: FAMILY MEDICINE CLINIC | Facility: CLINIC | Age: 65
End: 2025-02-14
Payer: OTHER GOVERNMENT

## 2025-02-14 VITALS
WEIGHT: 228 LBS | SYSTOLIC BLOOD PRESSURE: 134 MMHG | RESPIRATION RATE: 16 BRPM | DIASTOLIC BLOOD PRESSURE: 80 MMHG | HEART RATE: 68 BPM | HEIGHT: 69 IN | TEMPERATURE: 97.3 F | BODY MASS INDEX: 33.77 KG/M2 | OXYGEN SATURATION: 97 %

## 2025-02-14 DIAGNOSIS — I49.9 IRREGULAR CARDIAC RHYTHM: Primary | ICD-10-CM

## 2025-02-14 DIAGNOSIS — R09.89 CHEST CONGESTION: ICD-10-CM

## 2025-02-14 PROCEDURE — 99213 OFFICE O/P EST LOW 20 MIN: CPT | Performed by: FAMILY MEDICINE

## 2025-02-14 PROCEDURE — 93000 ELECTROCARDIOGRAM COMPLETE: CPT | Performed by: FAMILY MEDICINE

## 2025-02-14 PROCEDURE — 3079F DIAST BP 80-89 MM HG: CPT | Performed by: FAMILY MEDICINE

## 2025-02-14 PROCEDURE — 3075F SYST BP GE 130 - 139MM HG: CPT | Performed by: FAMILY MEDICINE

## 2025-02-14 ASSESSMENT — ENCOUNTER SYMPTOMS
WHEEZING: 0
SHORTNESS OF BREATH: 0
COUGH: 1
BACK PAIN: 1

## 2025-02-14 NOTE — PATIENT INSTRUCTIONS
Current Status:    EKG with PACs only  Episodic increased pulmonary mucus    Plan:  Pulmonary medicine Follow up  Appt next week as scheduled

## 2025-02-14 NOTE — PROGRESS NOTES
Mike Patel is a 64 y.o. male (: 1960) presenting to address:    Chief Complaint   Patient presents with    Cough     Cough started last week before last visit getting worse and now can feel rattling in chest .        Vitals:    25 1031   BP: 134/80   Pulse: 68   Resp: 16   Temp: 97.3 °F (36.3 °C)   SpO2: 97%       \"Have you been to the ER, urgent care clinic since your last visit?  Hospitalized since your last visit?\"    NO    “Have you seen or consulted any other health care providers outside of [facility] since your last visit?”    NO           
  Cardiovascular:  Negative for chest pain and palpitations.   Musculoskeletal:  Positive for back pain.        Sciatica symptoms improve after each dose of dexamethasone but then return       /80   Pulse 68   Temp 97.3 °F (36.3 °C) (Temporal)   Resp 16   Ht 1.753 m (5' 9\")   Wt 103.4 kg (228 lb)   SpO2 97%   BMI 33.67 kg/m²     Physical Exam  Vitals and nursing note reviewed.   Constitutional:       General: He is not in acute distress.     Appearance: Normal appearance. He is not ill-appearing.   HENT:      Head: Normocephalic.   Cardiovascular:      Rate and Rhythm: Normal rate. Rhythm irregular.      Heart sounds: Normal heart sounds.   Pulmonary:      Effort: Pulmonary effort is normal.      Breath sounds: Normal breath sounds.   Musculoskeletal:      Cervical back: Neck supple.   Skin:     General: Skin is warm and dry.   Neurological:      Mental Status: He is alert.   Psychiatric:         Mood and Affect: Mood normal.         Behavior: Behavior normal.          ASSESSMENT and PLAN    1. Irregular cardiac rhythm  -     EKG 12 Lead  2. Chest congestion      Current Status:    EKG with PACs only  Episodic increased pulmonary mucus    Plan:  Pulmonary medicine Follow up  Appt next week as scheduled    Jax Vail MD    Please Note:  This document has been produced using voice recognition software.  Unrecognized errors in transcription may be present.

## 2025-02-24 ASSESSMENT — ENCOUNTER SYMPTOMS
DIARRHEA: 0
VOMITING: 0
ANAL BLEEDING: 0
BLOOD IN STOOL: 0
ABDOMINAL PAIN: 0
CONSTIPATION: 0
NAUSEA: 0
WHEEZING: 0
SORE THROAT: 0
CHEST TIGHTNESS: 0
SHORTNESS OF BREATH: 0
COUGH: 0

## 2025-02-24 NOTE — PROGRESS NOTES
HISTORY OF PRESENT ILLNESS  Mike Patel  is a 64 y.o. y.o. male    He presents for health assessment, preventative care and follow-up with a history of type 2 diabetes, vitamin D deficiency,  obesity, GERD symptoms followed by gastroenterology and chronic/recurrent cough followed by pulmonary medicine.  He has been seen here frequently over the past several weeks with symptoms of cough and chest congestion.  His most recent appointment was on 2/14/2025 at which time his exam was unremarkable and after discussion it was mutually agreed that the next best step would be follow-up with pulmonary medicine.  Subsequent external order reports indicates that he was seen at an urgent care clinic on 2/17/2025 and treated with doxycycline for suspected bronchitis.        Mr#: [id number]      Past Medical History:   Diagnosis Date    Bulging lumbar disc June 2014    L5/S1 (seee MRI June 2014); Small non-impinging left posterior disc protrusion L5/S1,    Duodenitis 05/09/2019    Noted on EGD 5/9/19 - Dr. Doss    GERD (gastroesophageal reflux disease)     Uses PRN OTC Prilosec, doing well with low acid foods    Low back pain 5/29/2014    Obesity 07/01/2015    Prediabetes     HbA1C 5.9% 12/15    Right-sided low back pain with right-sided sciatica 07/01/2015    Anticipating surgery 5/30/17 stenosis S1, DDD  L4-S1 with fusion of L5 & S1    Sleep apnea     Has CPAP    Spinal stenosis June 2014    Lumbar spine MRI with mild degenerative spondylosis without high-grade stenosis.    Vitamin D deficiency 2/17/2018 2/14/18:  Vit D 15 --> start OTC Vit D3 5000 units       Past Surgical History:   Procedure Laterality Date    ANTERIOR CRUCIATE LIGAMENT REPAIR  2004    COLONOSCOPY  03/01/2021    No significant mucosal lesions, 10-year follow-up, Dr. Rolle    LUMBAR LAMINECTOMY  05/30/2017    [facility] - Dr. Salter     UPPER GASTROINTESTINAL ENDOSCOPY N/A 05/09/2019    Dr. DES[facility] Doss[facility] [place]

## 2025-02-25 ENCOUNTER — OFFICE VISIT (OUTPATIENT)
Dept: FAMILY MEDICINE CLINIC | Facility: CLINIC | Age: 65
End: 2025-02-25

## 2025-02-25 VITALS
HEART RATE: 87 BPM | HEIGHT: 69 IN | TEMPERATURE: 98 F | BODY MASS INDEX: 33.33 KG/M2 | SYSTOLIC BLOOD PRESSURE: 130 MMHG | DIASTOLIC BLOOD PRESSURE: 90 MMHG | RESPIRATION RATE: 16 BRPM | WEIGHT: 225 LBS | OXYGEN SATURATION: 95 %

## 2025-02-25 DIAGNOSIS — E11.9 TYPE 2 DIABETES MELLITUS WITHOUT COMPLICATION, WITHOUT LONG-TERM CURRENT USE OF INSULIN (HCC): ICD-10-CM

## 2025-02-25 DIAGNOSIS — E55.9 VITAMIN D DEFICIENCY: ICD-10-CM

## 2025-02-25 DIAGNOSIS — K21.9 GASTROESOPHAGEAL REFLUX DISEASE, UNSPECIFIED WHETHER ESOPHAGITIS PRESENT: ICD-10-CM

## 2025-02-25 DIAGNOSIS — M54.31 RIGHT SIDED SCIATICA: ICD-10-CM

## 2025-02-25 DIAGNOSIS — Z00.00 ROUTINE GENERAL MEDICAL EXAMINATION AT A HEALTH CARE FACILITY: Primary | ICD-10-CM

## 2025-02-25 DIAGNOSIS — R09.89 CHEST CONGESTION: ICD-10-CM

## 2025-02-25 DIAGNOSIS — E66.811 OBESITY (BMI 30.0-34.9): ICD-10-CM

## 2025-02-25 DIAGNOSIS — I10 PRIMARY HYPERTENSION: ICD-10-CM

## 2025-02-25 ASSESSMENT — ENCOUNTER SYMPTOMS: BACK PAIN: 1

## 2025-02-25 NOTE — PATIENT INSTRUCTIONS
Current Status:  Type 2 diabetes in reasonable control with a hemoglobin A1c of 7.0% but less well-controlled compared with last year when the A1c was 5.8%  Hypertension mild diastolic blood pressure elevation after stress at work  Reflux symptoms controlled with current medication  Chest congestion/cough symptoms  Satisfactory vitamin D level  Obesity stable    Health Maintenance Recommendations:  Influenza immunization recommended every fall  COVID-19 immunization booster-available at the pharmacy  Diabetic eye exam  Colorectal cancer screening due March 2031    Plan:  Physical therapy referral  Continue current medications  Increase dulaglutide to 3 mg injected subcutaneously every 7 days  Avoid dietary salt, starch and sugar and as much as possible follow program of regular aerobic exercise.  Pulmonary medicine follow-up  Return for follow-up here with an in office hemoglobin A1c in 3 months, return sooner with any problems  Please always arrive at least 15 minutes before your scheduled appointment time.

## 2025-02-25 NOTE — PROGRESS NOTES
Mike Patel is a 64 y.o. male (: 1960) presenting to address:    Chief Complaint   Patient presents with    Annual Exam       Vitals:    25 1354   BP: (!) 130/90   Pulse:    Resp:    Temp:    SpO2:        \"Have you been to the ER, urgent care clinic since your last visit?  Hospitalized since your last visit?\"    NO    “Have you seen or consulted any other health care providers outside of [facility] since your last visit?”    NO

## 2025-04-21 DIAGNOSIS — M54.16 RIGHT LUMBAR RADICULOPATHY: Primary | ICD-10-CM

## 2025-04-30 ENCOUNTER — TELEPHONE (OUTPATIENT)
Dept: FAMILY MEDICINE CLINIC | Facility: CLINIC | Age: 65
End: 2025-04-30

## 2025-04-30 NOTE — TELEPHONE ENCOUNTER
Please advise Mr. Patel that the lumbosacral MRI from 5/28/2025 shows the decompression from his previous surgery at L5-S1 with no residual spinal canal stenosis (narrowing of the spinal canal).  There are less prominent findings at L2-L3 with mild spinal canal stenosis and at L4-L5 with additional crowding of the spinal canal by a synovial cyst, ligamentum flavum hypertrophy which can cause a posterolateral mass effect on the thecal sac (sac surrounding the spinal cord) and the descending bilateral nerve roots.    I think the most significant finding is that at L5-S1 there is bilateral neural foraminal narrowing with suspicion for impingement of the exiting right nerve root.  I would suspect that this may well be the cause of his current right-sided pain.  Also this was not reported on the MR scan from 2014.  The best next step would be to refer him to see an interventional pain management specialist.  These specialists are physicians with advanced training and either physiatry or anesthesia and could make a determination about the likelihood of improvement with an intervention such as an epidural steroid injection.  Please let me know if he would like to be referred and if he already knows of a specialist he would prefer to see.

## 2025-05-01 DIAGNOSIS — M54.16 RIGHT LUMBAR RADICULOPATHY: Primary | ICD-10-CM

## 2025-05-02 NOTE — TELEPHONE ENCOUNTER
Dr Flores is not in network with [facility] I [facility]  auth submitted and is pending review.

## 2025-05-06 ENCOUNTER — PATIENT MESSAGE (OUTPATIENT)
Dept: FAMILY MEDICINE CLINIC | Facility: CLINIC | Age: 65
End: 2025-05-06

## 2025-06-07 NOTE — PROGRESS NOTES
HISTORY OF PRESENT ILLNESS  Mike Patel  is a 64 y.o. y.o. male    He returns for follow-up of type 2 diabetes and hypertension after evaluation 3 weeks ago with an increase in hemoglobin A1c noted.  Mild elevation of the diastolic blood pressure was also noted.  He was asked to increase dulaglutide to 3 mg injected subcutaneously every 7 days.  He felt strongly that his elevated blood pressure was due to an unusually stressful morning and preferred not to consider medication change at that time.  It was agreed that he would continue losartan 25 mg daily.        Mr#: [id number]      Past Medical History:   Diagnosis Date    Bulging lumbar disc June 2014    L5/S1 (seee MRI June 2014); Small non-impinging left posterior disc protrusion L5/S1,    Duodenitis 05/09/2019    Noted on EGD 5/9/19 - Dr. Doss    GERD (gastroesophageal reflux disease)     Uses PRN OTC Prilosec, doing well with low acid foods    Low back pain 5/29/2014    Obesity 07/01/2015    Prediabetes     HbA1C 5.9% 12/15    Right-sided low back pain with right-sided sciatica 07/01/2015    Anticipating surgery 5/30/17 stenosis S1, DDD  L4-S1 with fusion of L5 & S1    Sleep apnea     Has CPAP    Spinal stenosis June 2014    Lumbar spine MRI with mild degenerative spondylosis without high-grade stenosis.    Vitamin D deficiency 2/17/2018 2/14/18:  Vit D 15 --> start OTC Vit D3 5000 units       Past Surgical History:   Procedure Laterality Date    ANTERIOR CRUCIATE LIGAMENT REPAIR  2004    COLONOSCOPY  03/01/2021    No significant mucosal lesions, 10-year follow-up, Dr. Rolle    LUMBAR LAMINECTOMY  05/30/2017    [facility] - Dr. Salter     UPPER GASTROINTESTINAL ENDOSCOPY N/A 05/09/2019    Dr. DESIREE Doss, [facility] dysphagia, [place], report in [facility]       Family History   Problem Relation Age of Onset    Diabetes Maternal Grandfather        Allergies   Allergen Reactions    Pregabalin Other (See Comments)     Weight gain       Social History

## 2025-06-09 ENCOUNTER — OFFICE VISIT (OUTPATIENT)
Dept: FAMILY MEDICINE CLINIC | Facility: CLINIC | Age: 65
End: 2025-06-09
Payer: OTHER GOVERNMENT

## 2025-06-09 VITALS
HEIGHT: 69 IN | WEIGHT: 221 LBS | HEART RATE: 62 BPM | BODY MASS INDEX: 32.73 KG/M2 | DIASTOLIC BLOOD PRESSURE: 70 MMHG | TEMPERATURE: 97.9 F | RESPIRATION RATE: 16 BRPM | SYSTOLIC BLOOD PRESSURE: 112 MMHG | OXYGEN SATURATION: 95 %

## 2025-06-09 DIAGNOSIS — E11.9 TYPE 2 DIABETES MELLITUS WITHOUT COMPLICATION, WITHOUT LONG-TERM CURRENT USE OF INSULIN (HCC): Primary | ICD-10-CM

## 2025-06-09 DIAGNOSIS — I10 PRIMARY HYPERTENSION: ICD-10-CM

## 2025-06-09 LAB — HBA1C MFR BLD: 6 %

## 2025-06-09 PROCEDURE — 3074F SYST BP LT 130 MM HG: CPT | Performed by: FAMILY MEDICINE

## 2025-06-09 PROCEDURE — 99213 OFFICE O/P EST LOW 20 MIN: CPT | Performed by: FAMILY MEDICINE

## 2025-06-09 PROCEDURE — 83036 HEMOGLOBIN GLYCOSYLATED A1C: CPT | Performed by: FAMILY MEDICINE

## 2025-06-09 PROCEDURE — 3044F HG A1C LEVEL LT 7.0%: CPT | Performed by: FAMILY MEDICINE

## 2025-06-09 PROCEDURE — 3078F DIAST BP <80 MM HG: CPT | Performed by: FAMILY MEDICINE

## 2025-06-09 RX ORDER — DICLOFENAC SODIUM 75 MG/1
75 TABLET, DELAYED RELEASE ORAL 2 TIMES DAILY
COMMUNITY
Start: 2025-03-04

## 2025-06-09 RX ORDER — CYCLOBENZAPRINE HCL 10 MG
10 TABLET ORAL NIGHTLY
COMMUNITY
Start: 2025-05-23

## 2025-06-09 NOTE — PATIENT INSTRUCTIONS
Current Status:  Type 2 diabetes controlled with a hemoglobin A1c of 6.0% compared with 7.0% 3 months ago  Hypertension well-controlled    Health Maintenance Recommendations:  Influenza immunization every fall  COVID-19 immunization booster-available at the pharmacy  Diabetic eye exam at least every 2 years  Colorectal cancer screening due March 2031    Plan:  Continue current medications  Avoid dietary salt, starch and sugar and as much as possible follow program of regular aerobic exercise.  Return for follow-up in office hemoglobin A1c in 4 months  Please schedule lab appointment followed by an annual physical exam appointment after 2/25/2026  Return sooner with any problems  Please always arrive at least 15 minutes before your scheduled appointment time.

## 2025-06-09 NOTE — PROGRESS NOTES
Mike Patel is a 64 y.o. male (: 1960) presenting to address:    Chief Complaint   Patient presents with    Diabetes    Hypertension       Vitals:    25 0719   BP: 112/70   Pulse: 62   Resp: 16   Temp: 97.9 °F (36.6 °C)   SpO2: 92%       \"Have you been to the ER, urgent care clinic since your last visit?  Hospitalized since your last visit?\"    NO    “Have you seen or consulted any other health care providers outside of [facility] since your last visit?”    Yes physical medicine

## 2025-06-14 DIAGNOSIS — Z79.4 TYPE 2 DIABETES MELLITUS WITH HYPERGLYCEMIA, WITH LONG-TERM CURRENT USE OF INSULIN (HCC): ICD-10-CM

## 2025-06-14 DIAGNOSIS — E11.65 TYPE 2 DIABETES MELLITUS WITH HYPERGLYCEMIA, WITH LONG-TERM CURRENT USE OF INSULIN (HCC): ICD-10-CM

## 2025-06-16 RX ORDER — EMPAGLIFLOZIN 25 MG/1
25 TABLET, FILM COATED ORAL DAILY
Qty: 90 TABLET | Refills: 3 | Status: SHIPPED | OUTPATIENT
Start: 2025-06-16

## 2025-06-16 NOTE — TELEPHONE ENCOUNTER
Last refill 04/19/2024 90 with 3 refills. Last OV 06/09/2025.    Future Appointments   Date Time Provider Department Center   10/9/2025  7:30 AM [facility]

## 2025-08-21 DIAGNOSIS — E11.9 TYPE 2 DIABETES MELLITUS WITHOUT COMPLICATION, WITHOUT LONG-TERM CURRENT USE OF INSULIN (HCC): ICD-10-CM
